# Patient Record
Sex: FEMALE | Race: WHITE | Employment: FULL TIME | URBAN - METROPOLITAN AREA
[De-identification: names, ages, dates, MRNs, and addresses within clinical notes are randomized per-mention and may not be internally consistent; named-entity substitution may affect disease eponyms.]

---

## 2017-05-15 ENCOUNTER — OFFICE VISIT (OUTPATIENT)
Dept: OBGYN CLINIC | Age: 22
End: 2017-05-15
Payer: MEDICARE

## 2017-05-15 VITALS
WEIGHT: 149 LBS | RESPIRATION RATE: 16 BRPM | BODY MASS INDEX: 27.42 KG/M2 | SYSTOLIC BLOOD PRESSURE: 126 MMHG | DIASTOLIC BLOOD PRESSURE: 77 MMHG | HEART RATE: 93 BPM | HEIGHT: 62 IN

## 2017-05-15 DIAGNOSIS — Z30.09 ENCOUNTER FOR OTHER GENERAL COUNSELING OR ADVICE ON CONTRACEPTION: Primary | ICD-10-CM

## 2017-05-15 PROCEDURE — 99212 OFFICE O/P EST SF 10 MIN: CPT | Performed by: SPECIALIST

## 2017-05-15 ASSESSMENT — ENCOUNTER SYMPTOMS
CONSTIPATION: 0
DIARRHEA: 0
VOMITING: 0
APNEA: 0
COUGH: 0
NAUSEA: 0
ABDOMINAL PAIN: 0
ABDOMINAL DISTENTION: 0
EYE PAIN: 0

## 2017-05-24 ENCOUNTER — PROCEDURE VISIT (OUTPATIENT)
Dept: OBGYN CLINIC | Age: 22
End: 2017-05-24
Payer: MEDICARE

## 2017-05-24 VITALS
BODY MASS INDEX: 27.07 KG/M2 | DIASTOLIC BLOOD PRESSURE: 78 MMHG | WEIGHT: 148 LBS | HEART RATE: 83 BPM | SYSTOLIC BLOOD PRESSURE: 122 MMHG | RESPIRATION RATE: 18 BRPM

## 2017-05-24 DIAGNOSIS — Z32.02 NEGATIVE PREGNANCY TEST: ICD-10-CM

## 2017-05-24 DIAGNOSIS — Z30.017 NEXPLANON INSERTION: Primary | ICD-10-CM

## 2017-05-24 PROCEDURE — 11981 INSERTION DRUG DLVR IMPLANT: CPT | Performed by: SPECIALIST

## 2017-05-24 PROCEDURE — 81025 URINE PREGNANCY TEST: CPT | Performed by: SPECIALIST

## 2017-05-24 ASSESSMENT — ENCOUNTER SYMPTOMS
VOMITING: 0
EYE PAIN: 0
APNEA: 0
CONSTIPATION: 0
NAUSEA: 0
ABDOMINAL DISTENTION: 0
ABDOMINAL PAIN: 0
DIARRHEA: 0
COUGH: 0

## 2017-08-01 ENCOUNTER — OFFICE VISIT (OUTPATIENT)
Dept: OBGYN CLINIC | Age: 22
End: 2017-08-01
Payer: MEDICARE

## 2017-08-01 ENCOUNTER — HOSPITAL ENCOUNTER (OUTPATIENT)
Age: 22
Setting detail: SPECIMEN
Discharge: HOME | End: 2017-08-01
Payer: MEDICARE

## 2017-08-01 VITALS
HEART RATE: 76 BPM | WEIGHT: 147 LBS | BODY MASS INDEX: 27.05 KG/M2 | SYSTOLIC BLOOD PRESSURE: 126 MMHG | RESPIRATION RATE: 18 BRPM | HEIGHT: 62 IN | OXYGEN SATURATION: 99 % | DIASTOLIC BLOOD PRESSURE: 78 MMHG

## 2017-08-01 DIAGNOSIS — R30.0 DYSURIA: ICD-10-CM

## 2017-08-01 DIAGNOSIS — R31.9 URINARY TRACT INFECTION WITH HEMATURIA, SITE UNSPECIFIED: ICD-10-CM

## 2017-08-01 DIAGNOSIS — R51.9 HEADACHE, UNSPECIFIED HEADACHE TYPE: ICD-10-CM

## 2017-08-01 DIAGNOSIS — Z32.02 NEGATIVE PREGNANCY TEST: ICD-10-CM

## 2017-08-01 DIAGNOSIS — Z12.4 CERVICAL CANCER SCREENING: ICD-10-CM

## 2017-08-01 DIAGNOSIS — Z01.419 WELL WOMAN EXAM: ICD-10-CM

## 2017-08-01 DIAGNOSIS — R11.0 NAUSEA: Primary | ICD-10-CM

## 2017-08-01 DIAGNOSIS — N39.0 URINARY TRACT INFECTION WITH HEMATURIA, SITE UNSPECIFIED: ICD-10-CM

## 2017-08-01 LAB
BILIRUBIN, POC: ABNORMAL
BLOOD URINE, POC: ABNORMAL
CLARITY, POC: ABNORMAL
COLOR, POC: YELLOW
CONTROL: NORMAL
GLUCOSE URINE, POC: 250
KETONES, POC: ABNORMAL
LEUKOCYTE EST, POC: ABNORMAL
NITRITE, POC: ABNORMAL
PH, POC: 6.5
PREGNANCY TEST URINE, POC: NORMAL
PROTEIN, POC: ABNORMAL
SPECIFIC GRAVITY, POC: 1.02
UROBILINOGEN, POC: 2

## 2017-08-01 PROCEDURE — 81025 URINE PREGNANCY TEST: CPT | Performed by: SPECIALIST

## 2017-08-01 PROCEDURE — 99395 PREV VISIT EST AGE 18-39: CPT | Performed by: SPECIALIST

## 2017-08-01 RX ORDER — NITROFURANTOIN 25; 75 MG/1; MG/1
100 CAPSULE ORAL 2 TIMES DAILY
Qty: 14 CAPSULE | Refills: 0 | Status: SHIPPED | OUTPATIENT
Start: 2017-08-01 | End: 2017-08-08

## 2017-08-01 ASSESSMENT — ENCOUNTER SYMPTOMS
ABDOMINAL PAIN: 0
EYE PAIN: 0
NAUSEA: 1
APNEA: 0
ABDOMINAL DISTENTION: 0
COUGH: 0
DIARRHEA: 0
CONSTIPATION: 0
VOMITING: 0

## 2017-08-03 LAB — CYTOLOGY REPORT: NORMAL

## 2018-04-26 ENCOUNTER — TELEPHONE (OUTPATIENT)
Dept: OBGYN CLINIC | Age: 23
End: 2018-04-26

## 2018-04-27 RX ORDER — METRONIDAZOLE 500 MG/1
500 TABLET ORAL 2 TIMES DAILY
Qty: 14 TABLET | Refills: 0 | Status: SHIPPED | OUTPATIENT
Start: 2018-04-27 | End: 2018-05-04

## 2018-04-27 RX ORDER — FLUCONAZOLE 100 MG/1
100 TABLET ORAL DAILY
Qty: 7 TABLET | Refills: 0 | Status: SHIPPED | OUTPATIENT
Start: 2018-04-27 | End: 2018-05-04

## 2018-08-17 ENCOUNTER — OFFICE VISIT (OUTPATIENT)
Dept: OBGYN CLINIC | Age: 23
End: 2018-08-17
Payer: MEDICARE

## 2018-08-17 VITALS
BODY MASS INDEX: 34.96 KG/M2 | DIASTOLIC BLOOD PRESSURE: 68 MMHG | RESPIRATION RATE: 18 BRPM | WEIGHT: 190 LBS | HEART RATE: 106 BPM | SYSTOLIC BLOOD PRESSURE: 102 MMHG

## 2018-08-17 DIAGNOSIS — Z30.46 ENCOUNTER FOR NEXPLANON REMOVAL: Primary | ICD-10-CM

## 2018-08-17 DIAGNOSIS — Z32.02 NEGATIVE PREGNANCY TEST: ICD-10-CM

## 2018-08-17 DIAGNOSIS — Z30.011 ENCOUNTER FOR BCP (BIRTH CONTROL PILLS) INITIAL PRESCRIPTION: ICD-10-CM

## 2018-08-17 LAB
CONTROL: NORMAL
PREGNANCY TEST URINE, POC: NORMAL

## 2018-08-17 PROCEDURE — 81025 URINE PREGNANCY TEST: CPT | Performed by: CLINICAL NURSE SPECIALIST

## 2018-08-17 PROCEDURE — G8417 CALC BMI ABV UP PARAM F/U: HCPCS | Performed by: CLINICAL NURSE SPECIALIST

## 2018-08-17 PROCEDURE — 4004F PT TOBACCO SCREEN RCVD TLK: CPT | Performed by: CLINICAL NURSE SPECIALIST

## 2018-08-17 PROCEDURE — 99213 OFFICE O/P EST LOW 20 MIN: CPT | Performed by: CLINICAL NURSE SPECIALIST

## 2018-08-17 PROCEDURE — 11982 REMOVE DRUG IMPLANT DEVICE: CPT | Performed by: CLINICAL NURSE SPECIALIST

## 2018-08-17 PROCEDURE — G8427 DOCREV CUR MEDS BY ELIG CLIN: HCPCS | Performed by: CLINICAL NURSE SPECIALIST

## 2018-08-17 RX ORDER — DESOGESTREL AND ETHINYL ESTRADIOL 21-5 (28)
1 KIT ORAL DAILY
Qty: 1 PACKET | Refills: 2 | Status: SHIPPED | OUTPATIENT
Start: 2018-08-17 | End: 2020-05-27

## 2018-08-17 ASSESSMENT — ENCOUNTER SYMPTOMS
GASTROINTESTINAL NEGATIVE: 1
EYES NEGATIVE: 1
ALLERGIC/IMMUNOLOGIC NEGATIVE: 1
RESPIRATORY NEGATIVE: 1

## 2018-08-17 NOTE — PROGRESS NOTES
DATE OF VISIT:  8/17/18    PATIENT NAME:  Mesfin Cox     YOB: 1995    REASON FOR VISIT:    Chief Complaint   Patient presents with    Contraception     Pt is here today to get her IUD removed. She states that since she got it put in, she has gained about 40 pounds. She denies any irregular bleeding/clotting. Pt would like to possibly do the pills so she can read on the IUD        HISTORY OF PRESENT ILLNESS:  HPI     Patient is a 26 yo female who presents for nexplanon removal.  Patient states that since she had her nexplanon placed she has gained approx. 50lbs. Patient would like to switch to birth control pills. No LMP recorded. Vitals:    08/17/18 0942   BP: 102/68   Site: Right Arm   Position: Sitting   Cuff Size: Large Adult   Pulse: 106   Resp: 18   Weight: 190 lb (86.2 kg)     Body mass index is 34.96 kg/m². No Known Allergies  Current Outpatient Prescriptions   Medication Sig Dispense Refill    desogestrel-ethinyl estradiol (KARIVA) 0.15-0.02/0.01 MG (21/5) per tablet Take 1 tablet by mouth daily 1 packet 2     No current facility-administered medications for this visit. Social History     Social History    Marital status: Single     Spouse name: N/A    Number of children: N/A    Years of education: N/A     Social History Main Topics    Smoking status: Current Every Day Smoker     Packs/day: 0.50     Types: Cigarettes    Smokeless tobacco: Never Used    Alcohol use No    Drug use: No    Sexual activity: Yes     Partners: Male     Other Topics Concern    None     Social History Narrative    None       REVIEW OF SYSTEMS:  Review of Systems   Constitutional: Positive for unexpected weight change (patient has gained 50 lbs since her nexplanon insertion). Negative for chills and fever. HENT: Negative. Eyes: Negative. Respiratory: Negative. Cardiovascular: Negative. Gastrointestinal: Negative. Endocrine: Negative.     Genitourinary: Negative for

## 2018-08-21 ENCOUNTER — HOSPITAL ENCOUNTER (OUTPATIENT)
Age: 23
Setting detail: SPECIMEN
Discharge: HOME | End: 2018-08-21
Payer: MEDICARE

## 2018-08-21 ENCOUNTER — OFFICE VISIT (OUTPATIENT)
Dept: OBGYN CLINIC | Age: 23
End: 2018-08-21
Payer: MEDICARE

## 2018-08-21 VITALS
BODY MASS INDEX: 35.04 KG/M2 | HEART RATE: 84 BPM | SYSTOLIC BLOOD PRESSURE: 116 MMHG | DIASTOLIC BLOOD PRESSURE: 70 MMHG | HEIGHT: 62 IN | WEIGHT: 190.4 LBS

## 2018-08-21 DIAGNOSIS — Z01.419 WOMEN'S ANNUAL ROUTINE GYNECOLOGICAL EXAMINATION: Primary | ICD-10-CM

## 2018-08-21 DIAGNOSIS — Z12.4 CERVICAL CANCER SCREENING: ICD-10-CM

## 2018-08-21 DIAGNOSIS — Z11.3 SCREENING FOR STDS (SEXUALLY TRANSMITTED DISEASES): ICD-10-CM

## 2018-08-21 PROCEDURE — 99395 PREV VISIT EST AGE 18-39: CPT | Performed by: CLINICAL NURSE SPECIALIST

## 2018-08-21 NOTE — PROGRESS NOTES
Constitutional:  Unexpected weight change, extreme fatigue, night sweats                                                                                    no  Skin:                           Rashes on left breast which began 2 days ago, moles   yes  Neurological:  Frequent headaches, seizures         no  Ophthalmic:  Recent visual changes no  ENT:   Difficulty swallowing  no  Breast:              Masses, pain, nipple discharge white and very intermittently                           yes     Respiratory:  Shortness of breath, coughing smoker           yes    Cardiovascular: Chest pain   no     Gastrointestinal: Chronic diarrhea/constipation, nausea/vomiting           no   Urogenital:  Urinary incontinence, frequency intermittently, urgency          yes                                         Heavy/irregular periods  Last menses was April          no                                      Vaginal discharge     Light brown discharge for approx. 2 days with itching              yes  Hematological: Bruises easy Denies clotting disorder  yes     Endocrine:  Hot flashes   no     Hot/Cold Intolerance  no    Psychological:            Mood and affect were within normal limits. Mood swings and does not feel like she needs meds          yes                                                                                                                   Physical Exam    Physical Exam:    Vitals:    08/21/18 1313   BP: 116/70   Pulse: 84        General Appearance: This  is a well developed, well nourished, well groomed female. Her BMI was reviewed. Nutritional decision making and exercise were discussed. Neurological:  The patient is alert and oriented to time, place, person, and situation    Skin:  A brief inspection of the skin revealed no rashes or lesions. Neck:  The neck was supple. Respiratory: There was unlabored respiratory effort. Lungs clear to ascultation. Cardiovascular:   The patients extremities were without calf tenderness or edema. Heart with a regular rate and rhythm. Abdomen: The abdomen was soft and non-tender with no guarding, rebound or rigidity. No hernias were appreciated. Breast:   The patients breasts were symmetrical.  There were no masses, discharge or retractions noted. Self breast exams were reviewed. Pelvic Exam:  The external genitalia was with a normal appearance. The vaginal vault was normal. There were no cystocele, rectocele, or enterocele appreciated. There was scant white vaginal discharge. The cervix was without lesions. There was no cervical motion tenderness. The uterus was mobile, midline and regular. The adnexa were palpated. No fullness, tenderness or masses appreciated. ASSESSMENT:     Normal annual well woman exam    25 y.o. Female; Annual   Diagnosis Orders   1. Women's annual routine gynecological examination  PAP SMEAR    Chlamydia Trachomatis & Neisseria gonorrhoeae (GC) by amplified detection    VAGINITIS DNA PROBE   2. Screening for STDs (sexually transmitted diseases)  Chlamydia Trachomatis & Neisseria gonorrhoeae (GC) by amplified detection    VAGINITIS DNA PROBE   3. Cervical cancer screening  PAP SMEAR     Return for 1 year for Annual and as needed. PLAN:  - Pap collected. Discussed new pap smear guidelines. - Birth control Discussed. - Smoking risk factors Discussed  - Diet and exercise reviewed. - Routine health maintenance per patients PCP.  - Return to clinic in 1 year or earlier with questions, problems, concerns.         Electronically signed by MACK Charles CNP on 8/21/2018 at 1:40 PM

## 2018-08-22 DIAGNOSIS — Z01.419 WOMEN'S ANNUAL ROUTINE GYNECOLOGICAL EXAMINATION: ICD-10-CM

## 2018-08-22 DIAGNOSIS — Z11.3 SCREENING FOR STDS (SEXUALLY TRANSMITTED DISEASES): ICD-10-CM

## 2018-08-22 LAB
C TRACH DNA GENITAL QL NAA+PROBE: NEGATIVE
DIRECT EXAM: NORMAL
Lab: NORMAL
N. GONORRHOEAE DNA: NEGATIVE
SPECIMEN DESCRIPTION: NORMAL
STATUS: NORMAL

## 2018-09-10 LAB — CYTOLOGY REPORT: NORMAL

## 2018-09-24 ENCOUNTER — APPOINTMENT (OUTPATIENT)
Dept: CT IMAGING | Age: 23
End: 2018-09-24
Payer: MEDICARE

## 2018-09-24 ENCOUNTER — APPOINTMENT (OUTPATIENT)
Dept: GENERAL RADIOLOGY | Age: 23
End: 2018-09-24
Payer: MEDICARE

## 2018-09-24 ENCOUNTER — HOSPITAL ENCOUNTER (EMERGENCY)
Age: 23
Discharge: HOME | End: 2018-09-25
Attending: EMERGENCY MEDICINE
Payer: MEDICARE

## 2018-09-24 VITALS
OXYGEN SATURATION: 99 % | HEART RATE: 94 BPM | RESPIRATION RATE: 16 BRPM | TEMPERATURE: 98.2 F | WEIGHT: 190 LBS | BODY MASS INDEX: 34.75 KG/M2 | DIASTOLIC BLOOD PRESSURE: 86 MMHG | SYSTOLIC BLOOD PRESSURE: 145 MMHG

## 2018-09-24 DIAGNOSIS — R07.89 CHEST WALL PAIN: Primary | ICD-10-CM

## 2018-09-24 DIAGNOSIS — R05.9 COUGH: ICD-10-CM

## 2018-09-24 LAB
ABSOLUTE EOS #: 0.12 K/UL (ref 0–0.4)
ABSOLUTE IMMATURE GRANULOCYTE: 0 K/UL (ref 0–0.3)
ABSOLUTE LYMPH #: 4.72 K/UL (ref 1–4.8)
ABSOLUTE MONO #: 0.12 K/UL (ref 0.1–0.8)
ANION GAP SERPL CALCULATED.3IONS-SCNC: 13 MMOL/L (ref 9–17)
BASOPHILS # BLD: 0 % (ref 0–2)
BASOPHILS ABSOLUTE: 0 K/UL (ref 0–0.2)
BUN BLDV-MCNC: 8 MG/DL (ref 6–20)
BUN/CREAT BLD: ABNORMAL (ref 9–20)
CALCIUM SERPL-MCNC: 8.8 MG/DL (ref 8.6–10.4)
CHLORIDE BLD-SCNC: 105 MMOL/L (ref 98–107)
CO2: 21 MMOL/L (ref 20–31)
CREAT SERPL-MCNC: 0.57 MG/DL (ref 0.5–0.9)
D-DIMER QUANTITATIVE: 0.63 MG/L FEU
DIFFERENTIAL TYPE: ABNORMAL
EKG ATRIAL RATE: 99 BPM
EKG P AXIS: 56 DEGREES
EKG P-R INTERVAL: 182 MS
EKG Q-T INTERVAL: 326 MS
EKG QRS DURATION: 82 MS
EKG QTC CALCULATION (BAZETT): 418 MS
EKG R AXIS: 30 DEGREES
EKG T AXIS: 44 DEGREES
EKG VENTRICULAR RATE: 99 BPM
EOSINOPHILS RELATIVE PERCENT: 1 % (ref 1–4)
GFR AFRICAN AMERICAN: >60 ML/MIN
GFR NON-AFRICAN AMERICAN: >60 ML/MIN
GFR SERPL CREATININE-BSD FRML MDRD: ABNORMAL ML/MIN/{1.73_M2}
GFR SERPL CREATININE-BSD FRML MDRD: ABNORMAL ML/MIN/{1.73_M2}
GLUCOSE BLD-MCNC: 131 MG/DL (ref 70–99)
HCT VFR BLD CALC: 43.9 % (ref 36.3–47.1)
HEMOGLOBIN: 14.4 G/DL (ref 11.9–15.1)
IMMATURE GRANULOCYTES: 0 %
LYMPHOCYTES # BLD: 40 % (ref 24–44)
MCH RBC QN AUTO: 30.7 PG (ref 25.2–33.5)
MCHC RBC AUTO-ENTMCNC: 32.8 G/DL (ref 28.4–34.8)
MCV RBC AUTO: 93.6 FL (ref 82.6–102.9)
MONOCYTES # BLD: 1 % (ref 1–7)
MORPHOLOGY: NORMAL
NRBC AUTOMATED: 0 PER 100 WBC
PDW BLD-RTO: 13.1 % (ref 11.8–14.4)
PLATELET # BLD: 303 K/UL (ref 138–453)
PLATELET ESTIMATE: ABNORMAL
PMV BLD AUTO: 9 FL (ref 8.1–13.5)
POC TROPONIN I: 0 NG/ML (ref 0–0.1)
POC TROPONIN INTERP: NORMAL
POTASSIUM SERPL-SCNC: 4.2 MMOL/L (ref 3.7–5.3)
RBC # BLD: 4.69 M/UL (ref 3.95–5.11)
RBC # BLD: ABNORMAL 10*6/UL
SEG NEUTROPHILS: 58 % (ref 36–66)
SEGMENTED NEUTROPHILS ABSOLUTE COUNT: 6.84 K/UL (ref 1.8–7.7)
SODIUM BLD-SCNC: 139 MMOL/L (ref 135–144)
TSH SERPL DL<=0.05 MIU/L-ACNC: 2.83 MIU/L (ref 0.3–5)
WBC # BLD: 11.8 K/UL (ref 3.5–11.3)
WBC # BLD: ABNORMAL 10*3/UL

## 2018-09-24 PROCEDURE — 93005 ELECTROCARDIOGRAM TRACING: CPT

## 2018-09-24 PROCEDURE — 84484 ASSAY OF TROPONIN QUANT: CPT

## 2018-09-24 PROCEDURE — 71046 X-RAY EXAM CHEST 2 VIEWS: CPT

## 2018-09-24 PROCEDURE — 99285 EMERGENCY DEPT VISIT HI MDM: CPT

## 2018-09-24 PROCEDURE — 71260 CT THORAX DX C+: CPT

## 2018-09-24 PROCEDURE — 6360000004 HC RX CONTRAST MEDICATION: Performed by: EMERGENCY MEDICINE

## 2018-09-24 PROCEDURE — 85025 COMPLETE CBC W/AUTO DIFF WBC: CPT

## 2018-09-24 PROCEDURE — 85379 FIBRIN DEGRADATION QUANT: CPT

## 2018-09-24 PROCEDURE — 84443 ASSAY THYROID STIM HORMONE: CPT

## 2018-09-24 PROCEDURE — 80048 BASIC METABOLIC PNL TOTAL CA: CPT

## 2018-09-24 RX ADMIN — IOPAMIDOL 75 ML: 755 INJECTION, SOLUTION INTRAVENOUS at 22:42

## 2018-09-24 ASSESSMENT — HEART SCORE: ECG: 1

## 2018-09-24 ASSESSMENT — ENCOUNTER SYMPTOMS
SHORTNESS OF BREATH: 1
COUGH: 1
ABDOMINAL PAIN: 0
RHINORRHEA: 0

## 2018-09-24 ASSESSMENT — PAIN DESCRIPTION - ORIENTATION: ORIENTATION: LEFT

## 2018-09-24 ASSESSMENT — PAIN DESCRIPTION - FREQUENCY: FREQUENCY: INTERMITTENT

## 2018-09-24 ASSESSMENT — PAIN SCALES - GENERAL: PAINLEVEL_OUTOF10: 7

## 2018-09-24 ASSESSMENT — PAIN DESCRIPTION - DESCRIPTORS: DESCRIPTORS: SHARP

## 2018-09-24 ASSESSMENT — PAIN DESCRIPTION - PAIN TYPE: TYPE: ACUTE PAIN

## 2018-09-24 ASSESSMENT — PAIN DESCRIPTION - LOCATION: LOCATION: RIB CAGE

## 2018-09-25 LAB
POC TROPONIN I: 0.01 NG/ML (ref 0–0.1)
POC TROPONIN INTERP: NORMAL

## 2018-09-25 PROCEDURE — 84484 ASSAY OF TROPONIN QUANT: CPT

## 2018-09-25 NOTE — ED PROVIDER NOTES
circumstances.)    MD Atiya Hensley  Attending Emergency Medicine Physician            Juan Sears MD  09/24/18 7602

## 2018-09-25 NOTE — ED NOTES
Pt ambulatory to room, c/o left side rib/chest pain when lifting heavy objects, moving a certain way or taking a deep breath. Pt also c/o cough, since yesterday. Pt arrived with NAD noted, resp even and unlabored, alert and oriented x4, family at bedside. Pt also states she has sore throat.  Bed locked in lowest position and call light within reach,.           Damian Monique RN  09/24/18 4471

## 2018-09-25 NOTE — ED NOTES
Pt resting on cot with no distress noted, family at bedside, no distress, rr even and unlabored, call light within reach. Awaiting second trop.       Cindy Farnsworth RN  09/24/18 6605

## 2018-09-25 NOTE — ED PROVIDER NOTES
[facility]  ED  Emergency Department Encounter  Emergency Medicine Resident     Pt Name: Celestino Vargas  MRN: [id number]  [place] 1995  Date of evaluation: 9/24/18  PCP:  Everett Awad DO    CHIEF COMPLAINT       Chief Complaint   Patient presents with    Cough     since yesterday    Chest Pain     left sided, sharp when moving or lifting heavy objects, radiates to rigth shoulder at times    Pharyngitis       HISTORY OF PRESENT ILLNESS  (Location/Symptom, Timing/Onset, Context/Setting, Quality, Duration, Modifying Factors, Severity, Associated signs/symptoms)     Celestino Vargas is a 25 y.o. female who presents Multiple complaints of cough, chest pressure. States that this is an ongoing for the last few days. She does state that when she takes a deep breath she has worsening of the substernal chest pain. Does not have a history of any DVT but does have a risk factor of being on birth control. Denies any swelling in her lower extremity's. She does state that she has some shortness of breath associated with her symptoms. Denies any jaw pain or nausea or vomiting associated with her symptoms. Is a daily smoker. No history of CAD. No history of CAD and family although mom does have MVP. Otherwise denies any active medical issues and does not take any medications daily. PAST MEDICAL / SURGICAL / SOCIAL / FAMILY HISTORY      has no past medical history on file. has a past surgical history that includes Tonsillectomy and Cystocopy. Social History     Social History    Marital status: Single     Spouse name: N/A    Number of children: N/A    Years of education: N/A     Occupational History    Not on file.      Social History Main Topics    Smoking status: Current Every Day Smoker     Packs/day: 0.50     Types: Cigarettes    Smokeless tobacco: Never Used    Alcohol use No    Drug use: No    Sexual activity: Yes     Partners: Male     Other Topics Concern  Not on file     Social History Narrative    No narrative on file       Family History   Problem Relation Age of Onset    Heart Disease Father     Asthma Brother        Allergies:  Patient has no known allergies. Home Medications:  Prior to Admission medications    Medication Sig Start Date End Date Taking? Authorizing Provider   desogestrel-ethinyl estradiol (KARIVA) 0.15-0.02/0.01 MG (21/5) per tablet Take 1 tablet by mouth daily 8/17/18  Yes Palomo Ayers, APRN - CNP       REVIEW OF SYSTEMS    (2-9 systems for level 4, 10 or more for level 5)      Review of Systems   Constitutional: Negative for chills and fever. HENT: Negative for congestion and rhinorrhea. Eyes: Negative for visual disturbance. Respiratory: Positive for cough and shortness of breath. Cardiovascular: Positive for chest pain. Gastrointestinal: Negative for abdominal pain. Genitourinary: Negative for dysuria and frequency. Musculoskeletal: Negative for arthralgias. Skin: Negative for wound. Neurological: Negative for dizziness and light-headedness. PHYSICAL EXAM   (up to 7 for level 4, 8 or more for level 5)      INITIAL VITALS:   BP (!) 145/86   Pulse 94   Temp 98.2 °F (36.8 °C) (Oral)   Resp 16   Wt 190 lb (86.2 kg)   SpO2 99%   BMI 34.75 kg/m²     Physical Exam   Constitutional: She is oriented to person, place, and time. No distress. HENT:   Head: Normocephalic and atraumatic. Eyes: Right eye exhibits no discharge. Left eye exhibits no discharge. Cardiovascular: Normal rate, regular rhythm and normal heart sounds. Exam reveals no friction rub. No murmur heard. Pulmonary/Chest: Effort normal and breath sounds normal. No stridor. No respiratory distress. She has no wheezes. She has no rales. She exhibits no tenderness. Abdominal: Soft. She exhibits no distension. There is no tenderness. There is no guarding. Neurological: She is alert and oriented to person, place, and time.    Skin: Skin is 31 mmol/L    Anion Gap 13 9 - 17 mmol/L    GFR Non-African American >60 >60 mL/min    GFR African American >60 >60 mL/min    GFR Comment          GFR Staging NOT REPORTED    TSH with Reflex   Result Value Ref Range    TSH 2.83 0.30 - 5.00 mIU/L   D-Dimer, Quantitative   Result Value Ref Range    D-Dimer, Quant 0.63 mg/L FEU   POCT troponin   Result Value Ref Range    POC Troponin I 0.00 0.00 - 0.10 ng/mL    POC Troponin Interp       The Troponin-I (POC) results cannot be compared to the Troponin-T results. POCT troponin   Result Value Ref Range    POC Troponin I 0.01 0.00 - 0.10 ng/mL    POC Troponin Interp       The Troponin-I (POC) results cannot be compared to the Troponin-T results. RADIOLOGY:  Xr Chest Standard (2 Vw)    Result Date: 9/24/2018  EXAMINATION: TWO VIEWS OF THE CHEST 9/24/2018 10:06 pm COMPARISON: 10/21/2011 HISTORY: ORDERING SYSTEM PROVIDED HISTORY: chest pain TECHNOLOGIST PROVIDED HISTORY: chest pain FINDINGS: The cardiac silhouette is within normal limits for size. The pulmonary vasculature is within normal limits. There is no focal consolidation, pleural effusion or pneumothorax. The visualized osseous structures demonstrate no acute abnormality. No acute cardiopulmonary abnormality. Ct Chest Pulmonary Embolism W Contrast    Result Date: 9/24/2018  EXAMINATION: CTA OF THE CHEST 9/24/2018 10:50 pm TECHNIQUE: CTA of the chest was performed after the administration of intravenous contrast.  Multiplanar reformatted images are provided for review. MIP images are provided for review. Dose modulation, iterative reconstruction, and/or weight based adjustment of the mA/kV was utilized to reduce the radiation dose to as low as reasonably achievable. COMPARISON: None. HISTORY: ORDERING SYSTEM PROVIDED, possible pulmonary embolism. Acute onset of chest pain following lifting objects. Initial presentation.  FINDINGS: Pulmonary Arteries: No filling defects are identified within the main,

## 2018-10-06 ENCOUNTER — HOSPITAL ENCOUNTER (OUTPATIENT)
Age: 23
Discharge: HOME | End: 2018-10-06
Payer: MEDICARE

## 2018-10-06 LAB
ALBUMIN SERPL-MCNC: 3.9 G/DL (ref 3.5–5.2)
ALBUMIN/GLOBULIN RATIO: 1.3 (ref 1–2.5)
ALP BLD-CCNC: 108 U/L (ref 35–104)
ALT SERPL-CCNC: 25 U/L (ref 5–33)
AST SERPL-CCNC: 15 U/L
BILIRUB SERPL-MCNC: 0.38 MG/DL (ref 0.3–1.2)
BILIRUBIN DIRECT: 0.09 MG/DL
BILIRUBIN, INDIRECT: 0.29 MG/DL (ref 0–1)
CHOLESTEROL/HDL RATIO: 6.1
CHOLESTEROL: 182 MG/DL
GLOBULIN: ABNORMAL G/DL (ref 1.5–3.8)
HDLC SERPL-MCNC: 30 MG/DL
LDL CHOLESTEROL: 128 MG/DL (ref 0–130)
T3 FREE: 3.67 PG/ML (ref 2.02–4.43)
THYROXINE, FREE: 1.36 NG/DL (ref 0.93–1.7)
TOTAL PROTEIN: 6.9 G/DL (ref 6.4–8.3)
TRIGL SERPL-MCNC: 121 MG/DL
TSH SERPL DL<=0.05 MIU/L-ACNC: 1.16 MIU/L (ref 0.3–5)
VLDLC SERPL CALC-MCNC: ABNORMAL MG/DL (ref 1–30)

## 2018-10-06 PROCEDURE — 84439 ASSAY OF FREE THYROXINE: CPT

## 2018-10-06 PROCEDURE — 80061 LIPID PANEL: CPT

## 2018-10-06 PROCEDURE — 83036 HEMOGLOBIN GLYCOSYLATED A1C: CPT

## 2018-10-06 PROCEDURE — 84481 FREE ASSAY (FT-3): CPT

## 2018-10-06 PROCEDURE — 80076 HEPATIC FUNCTION PANEL: CPT

## 2018-10-06 PROCEDURE — 36415 COLL VENOUS BLD VENIPUNCTURE: CPT

## 2018-10-06 PROCEDURE — 84443 ASSAY THYROID STIM HORMONE: CPT

## 2018-10-07 LAB
ESTIMATED AVERAGE GLUCOSE: 111 MG/DL
HBA1C MFR BLD: 5.5 % (ref 4–6)

## 2019-04-04 ENCOUNTER — HOSPITAL ENCOUNTER (EMERGENCY)
Age: 24
Discharge: HOME | End: 2019-04-04
Attending: EMERGENCY MEDICINE
Payer: MEDICARE

## 2019-04-04 VITALS
WEIGHT: 180 LBS | BODY MASS INDEX: 33.13 KG/M2 | HEIGHT: 62 IN | RESPIRATION RATE: 16 BRPM | SYSTOLIC BLOOD PRESSURE: 136 MMHG | TEMPERATURE: 98.2 F | DIASTOLIC BLOOD PRESSURE: 73 MMHG | OXYGEN SATURATION: 100 % | HEART RATE: 97 BPM

## 2019-04-04 DIAGNOSIS — M54.31 SCIATICA OF RIGHT SIDE: Primary | ICD-10-CM

## 2019-04-04 PROCEDURE — 99283 EMERGENCY DEPT VISIT LOW MDM: CPT

## 2019-04-04 ASSESSMENT — PAIN DESCRIPTION - FREQUENCY: FREQUENCY: INTERMITTENT

## 2019-04-04 ASSESSMENT — PAIN DESCRIPTION - ORIENTATION: ORIENTATION: RIGHT;UPPER

## 2019-04-04 ASSESSMENT — ENCOUNTER SYMPTOMS: BACK PAIN: 1

## 2019-04-04 ASSESSMENT — PAIN DESCRIPTION - LOCATION: LOCATION: LEG

## 2019-04-04 ASSESSMENT — PAIN DESCRIPTION - DESCRIPTORS: DESCRIPTORS: TINGLING;SHARP

## 2019-04-04 ASSESSMENT — PAIN SCALES - GENERAL: PAINLEVEL_OUTOF10: 5
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
## 2019-04-05 NOTE — ED PROVIDER NOTES
[address] ED  eMERGENCY dEPARTMENT eNCOUnter   Independent Attestation     Pt Name: Dilip Mejias  MRN: [id number]  [place] 1995  Date of evaluation: 4/4/19       Dilip Mejias is a 21 y.o. female who presents with Leg Pain        Based on the medical record, the care appears appropriate. I was personally available for consultation in the Emergency Department.     Hunter Galvan MD  Attending Emergency  Physician                  Hunter Galvan MD  04/04/19 9527

## 2019-09-23 ENCOUNTER — OFFICE VISIT (OUTPATIENT)
Dept: OBGYN CLINIC | Age: 24
End: 2019-09-23
Payer: MEDICARE

## 2019-09-23 VITALS
SYSTOLIC BLOOD PRESSURE: 127 MMHG | HEART RATE: 87 BPM | DIASTOLIC BLOOD PRESSURE: 79 MMHG | HEIGHT: 62 IN | BODY MASS INDEX: 33.49 KG/M2 | WEIGHT: 182 LBS

## 2019-09-23 DIAGNOSIS — N76.0 ACUTE VAGINITIS: Primary | ICD-10-CM

## 2019-09-23 DIAGNOSIS — N92.6 IRREGULAR BLEEDING: ICD-10-CM

## 2019-09-23 PROCEDURE — 4004F PT TOBACCO SCREEN RCVD TLK: CPT | Performed by: SPECIALIST

## 2019-09-23 PROCEDURE — G8427 DOCREV CUR MEDS BY ELIG CLIN: HCPCS | Performed by: SPECIALIST

## 2019-09-23 PROCEDURE — G8417 CALC BMI ABV UP PARAM F/U: HCPCS | Performed by: SPECIALIST

## 2019-09-23 PROCEDURE — 99213 OFFICE O/P EST LOW 20 MIN: CPT | Performed by: SPECIALIST

## 2019-09-23 RX ORDER — METRONIDAZOLE 500 MG/1
500 TABLET ORAL 2 TIMES DAILY
Qty: 14 TABLET | Refills: 0 | Status: SHIPPED | OUTPATIENT
Start: 2019-09-23 | End: 2019-09-30

## 2019-09-23 RX ORDER — FLUCONAZOLE 100 MG/1
100 TABLET ORAL DAILY
Qty: 7 TABLET | Refills: 0 | Status: SHIPPED | OUTPATIENT
Start: 2019-09-23 | End: 2019-09-30

## 2019-09-23 ASSESSMENT — ENCOUNTER SYMPTOMS
ABDOMINAL PAIN: 0
VOMITING: 0
EYE PAIN: 0
ABDOMINAL DISTENTION: 0
NAUSEA: 0
COUGH: 0
CONSTIPATION: 0
APNEA: 0
DIARRHEA: 0

## 2019-09-23 NOTE — PROGRESS NOTES
Subjective:      Patient ID: Janine Bustillo is a 21 y.o. female. Chief Complaint   Patient presents with    Vaginal Discharge     Patient is here today for vaginal discharge. She states that it is a slimy yellow/white color. She itches sometimes, and there is a slight odor. /79 (Site: Right Upper Arm, Position: Sitting, Cuff Size: Medium Adult)   Pulse 87   Ht 5' 2\" (1.575 m)   Wt 182 lb (82.6 kg)   LMP 09/17/2019   BMI 33.29 kg/m²   Patient's last menstrual period was 09/17/2019. [id number]    No past medical history on file. Current Outpatient Medications Ordered in Epic   Medication Sig Dispense Refill    metroNIDAZOLE (FLAGYL) 500 MG tablet Take 1 tablet by mouth 2 times daily for 7 days 14 tablet 0    fluconazole (DIFLUCAN) 100 MG tablet Take 1 tablet by mouth daily for 7 days 7 tablet 0    desogestrel-ethinyl estradiol (KARIVA) 0.15-0.02/0.01 MG (21/5) per tablet Take 1 tablet by mouth daily (Patient not taking: Reported on 9/23/2019) 1 packet 2     No current Epic-ordered facility-administered medications on file. Problem List Items Addressed This Visit     None      Visit Diagnoses     Acute vaginitis    -  Primary        No Known Allergies  No orders of the defined types were placed in this encounter. Patient is here today complaining of a vaginal discharge. She states that it is a slimy yellow/white color. She itches sometimes, and there is a slight odor. She also complains of irregular periods. She is not using any birth control and would like to get pregnant. Review of Systems   Constitutional: Negative for activity change, appetite change and fever. HENT: Negative for ear discharge and ear pain. Eyes: Negative for pain and visual disturbance. Respiratory: Negative for apnea and cough. Cardiovascular: Negative for chest pain, palpitations and leg swelling.    Gastrointestinal: Negative for abdominal distention, abdominal pain, constipation,

## 2020-05-27 ENCOUNTER — OFFICE VISIT (OUTPATIENT)
Dept: OBGYN CLINIC | Age: 25
End: 2020-05-27
Payer: MEDICARE

## 2020-05-27 ENCOUNTER — HOSPITAL ENCOUNTER (OUTPATIENT)
Age: 25
Setting detail: SPECIMEN
Discharge: HOME | End: 2020-05-27
Payer: MEDICARE

## 2020-05-27 VITALS
WEIGHT: 195 LBS | HEIGHT: 62 IN | TEMPERATURE: 99.3 F | BODY MASS INDEX: 35.88 KG/M2 | SYSTOLIC BLOOD PRESSURE: 122 MMHG | DIASTOLIC BLOOD PRESSURE: 72 MMHG

## 2020-05-27 LAB
CONTROL: NORMAL
HCG QUANTITATIVE: ABNORMAL IU/L
PREGNANCY TEST URINE, POC: NORMAL

## 2020-05-27 PROCEDURE — 4004F PT TOBACCO SCREEN RCVD TLK: CPT | Performed by: CLINICAL NURSE SPECIALIST

## 2020-05-27 PROCEDURE — 81025 URINE PREGNANCY TEST: CPT | Performed by: CLINICAL NURSE SPECIALIST

## 2020-05-27 PROCEDURE — 99213 OFFICE O/P EST LOW 20 MIN: CPT | Performed by: CLINICAL NURSE SPECIALIST

## 2020-05-27 PROCEDURE — G8417 CALC BMI ABV UP PARAM F/U: HCPCS | Performed by: CLINICAL NURSE SPECIALIST

## 2020-05-27 PROCEDURE — G8427 DOCREV CUR MEDS BY ELIG CLIN: HCPCS | Performed by: CLINICAL NURSE SPECIALIST

## 2020-05-27 RX ORDER — VITAMIN A ACETATE, .BETA.-CAROTENE, ASCORBIC ACID, CHOLECALCIFEROL, .ALPHA.-TOCOPHEROL ACETATE, DL-, THIAMINE MONONITRATE, RIBOFLAVIN, NIACINAMIDE, PYRIDOXINE HYDROCHLORIDE, FOLIC ACID, CYANOCOBALAMIN, CALCIUM CARBONATE, FERROUS FUMARATE, ZINC OXIDE, AND CUPRIC OXIDE 2000; 2000; 120; 400; 22; 1.84; 3; 20; 10; 1; 12; 200; 27; 25; 2 [IU]/1; [IU]/1; MG/1; [IU]/1; MG/1; MG/1; MG/1; MG/1; MG/1; MG/1; UG/1; MG/1; MG/1; MG/1; MG/1
1 TABLET ORAL DAILY
Qty: 30 TABLET | Refills: 11 | Status: SHIPPED | OUTPATIENT
Start: 2020-05-27 | End: 2021-04-12

## 2020-05-27 NOTE — PROGRESS NOTES
today for a quant. Draw per physician order using sterile technique. Drawn from the right antecube. PLAN:  UCG was done and noted as positive  Prenatal vitamins were ordered: Yes  Ultrasound for dating and viability was ordered: Yes  1 hour glucola was ordered: Yes  She was instructed to call with any concerns or worsening of any symptoms  She will return for New OB intake visit  Dede akhtar reviewed    Patient was seen with total face to face time of 15 minutes. More than 50% of this visit was spent face to face coordinating plan of care and answering questions . She was also counseled on her preventative health maintenance recommendations and follow-up. Return for call in am for quant level if 5000 or above will schedule dating US.     Electronically signed by: Magalie Miller CNP

## 2020-06-29 ENCOUNTER — HOSPITAL ENCOUNTER (OUTPATIENT)
Age: 25
Setting detail: SPECIMEN
Discharge: HOME | End: 2020-06-29
Payer: MEDICARE

## 2020-06-29 ENCOUNTER — INITIAL PRENATAL (OUTPATIENT)
Dept: OBGYN CLINIC | Age: 25
End: 2020-06-29
Payer: MEDICARE

## 2020-06-29 VITALS
BODY MASS INDEX: 35.55 KG/M2 | HEIGHT: 62 IN | TEMPERATURE: 99.1 F | SYSTOLIC BLOOD PRESSURE: 111 MMHG | DIASTOLIC BLOOD PRESSURE: 65 MMHG | WEIGHT: 193.2 LBS | HEART RATE: 97 BPM

## 2020-06-29 PROCEDURE — G8417 CALC BMI ABV UP PARAM F/U: HCPCS | Performed by: CLINICAL NURSE SPECIALIST

## 2020-06-29 PROCEDURE — 36415 COLL VENOUS BLD VENIPUNCTURE: CPT | Performed by: CLINICAL NURSE SPECIALIST

## 2020-06-29 PROCEDURE — H1000 PRENATAL CARE ATRISK ASSESSM: HCPCS | Performed by: CLINICAL NURSE SPECIALIST

## 2020-06-29 PROCEDURE — G8427 DOCREV CUR MEDS BY ELIG CLIN: HCPCS | Performed by: CLINICAL NURSE SPECIALIST

## 2020-06-29 PROCEDURE — 4004F PT TOBACCO SCREEN RCVD TLK: CPT | Performed by: CLINICAL NURSE SPECIALIST

## 2020-06-29 PROCEDURE — 99213 OFFICE O/P EST LOW 20 MIN: CPT | Performed by: CLINICAL NURSE SPECIALIST

## 2020-06-30 LAB
-: NORMAL
ABO/RH: NORMAL
ABSOLUTE EOS #: 0.14 K/UL (ref 0–0.44)
ABSOLUTE IMMATURE GRANULOCYTE: 0.05 K/UL (ref 0–0.3)
ABSOLUTE LYMPH #: 3.04 K/UL (ref 1.1–3.7)
ABSOLUTE MONO #: 0.69 K/UL (ref 0.1–1.2)
AMORPHOUS: NORMAL
AMPHETAMINE SCREEN URINE: NEGATIVE
ANTIBODY SCREEN: NEGATIVE
BACTERIA: NORMAL
BARBITURATE SCREEN URINE: NEGATIVE
BASOPHILS # BLD: 0 % (ref 0–2)
BASOPHILS ABSOLUTE: <0.03 K/UL (ref 0–0.2)
BENZODIAZEPINE SCREEN, URINE: NEGATIVE
BILIRUBIN URINE: NEGATIVE
BUPRENORPHINE URINE: NORMAL
CANNABINOID SCREEN URINE: NEGATIVE
CASTS UA: NORMAL /LPF (ref 0–8)
COCAINE METABOLITE, URINE: NEGATIVE
COLOR: YELLOW
COMMENT UA: ABNORMAL
CRYSTALS, UA: NORMAL /HPF
DIFFERENTIAL TYPE: ABNORMAL
EOSINOPHILS RELATIVE PERCENT: 1 % (ref 1–4)
EPITHELIAL CELLS UA: NORMAL /HPF (ref 0–5)
GLUCOSE ADMINISTRATION: NORMAL
GLUCOSE TOLERANCE SCREEN 50G: 99 MG/DL (ref 70–135)
GLUCOSE URINE: NEGATIVE
HCT VFR BLD CALC: 41.5 % (ref 36.3–47.1)
HEMOGLOBIN: 13.1 G/DL (ref 11.9–15.1)
HEPATITIS B SURFACE ANTIGEN: NONREACTIVE
HIV AG/AB: NONREACTIVE
IMMATURE GRANULOCYTES: 0 %
KETONES, URINE: NEGATIVE
LEUKOCYTE ESTERASE, URINE: NEGATIVE
LYMPHOCYTES # BLD: 25 % (ref 24–43)
MCH RBC QN AUTO: 30.8 PG (ref 25.2–33.5)
MCHC RBC AUTO-ENTMCNC: 31.6 G/DL (ref 28.4–34.8)
MCV RBC AUTO: 97.6 FL (ref 82.6–102.9)
MDMA URINE: NORMAL
METHADONE SCREEN, URINE: NEGATIVE
METHAMPHETAMINE, URINE: NORMAL
MONOCYTES # BLD: 6 % (ref 3–12)
MUCUS: NORMAL
NITRITE, URINE: NEGATIVE
NRBC AUTOMATED: 0 PER 100 WBC
OPIATES, URINE: NEGATIVE
OTHER OBSERVATIONS UA: NORMAL
OXYCODONE SCREEN URINE: NEGATIVE
PDW BLD-RTO: 13.7 % (ref 11.8–14.4)
PH UA: 6.5 (ref 5–8)
PHENCYCLIDINE, URINE: NEGATIVE
PLATELET # BLD: 349 K/UL (ref 138–453)
PLATELET ESTIMATE: ABNORMAL
PMV BLD AUTO: 9.5 FL (ref 8.1–13.5)
PROPOXYPHENE, URINE: NORMAL
PROTEIN UA: NEGATIVE
RBC # BLD: 4.25 M/UL (ref 3.95–5.11)
RBC # BLD: ABNORMAL 10*6/UL
RBC UA: NORMAL /HPF (ref 0–4)
RENAL EPITHELIAL, UA: NORMAL /HPF
RUBV IGG SER QL: 77.2 IU/ML
SEG NEUTROPHILS: 68 % (ref 36–65)
SEGMENTED NEUTROPHILS ABSOLUTE COUNT: 8.11 K/UL (ref 1.5–8.1)
SICKLE CELL SCREEN: NEGATIVE
SPECIFIC GRAVITY UA: 1.01 (ref 1–1.03)
T. PALLIDUM, IGG: NONREACTIVE
TEST INFORMATION: NORMAL
TRICHOMONAS: NORMAL
TRICYCLIC ANTIDEPRESSANTS, UR: NORMAL
TSH SERPL DL<=0.05 MIU/L-ACNC: 0.82 MIU/L (ref 0.3–5)
TURBIDITY: ABNORMAL
URINE HGB: ABNORMAL
UROBILINOGEN, URINE: NORMAL
WBC # BLD: 12.1 K/UL (ref 3.5–11.3)
WBC # BLD: ABNORMAL 10*3/UL
WBC UA: NORMAL /HPF (ref 0–5)
YEAST: NORMAL

## 2020-07-28 ENCOUNTER — ROUTINE PRENATAL (OUTPATIENT)
Dept: OBGYN CLINIC | Age: 25
End: 2020-07-28
Payer: MEDICARE

## 2020-07-28 VITALS
TEMPERATURE: 98.6 F | BODY MASS INDEX: 34.96 KG/M2 | HEART RATE: 103 BPM | HEIGHT: 62 IN | SYSTOLIC BLOOD PRESSURE: 132 MMHG | DIASTOLIC BLOOD PRESSURE: 79 MMHG | WEIGHT: 190 LBS

## 2020-07-28 PROBLEM — Z3A.15 15 WEEKS GESTATION OF PREGNANCY: Status: ACTIVE | Noted: 2020-07-28

## 2020-07-28 PROBLEM — Z3A.00 WEEKS OF GESTATION OF PREGNANCY NOT SPECIFIED: Status: ACTIVE | Noted: 2020-07-28

## 2020-07-28 PROCEDURE — G8417 CALC BMI ABV UP PARAM F/U: HCPCS | Performed by: SPECIALIST

## 2020-07-28 PROCEDURE — G8427 DOCREV CUR MEDS BY ELIG CLIN: HCPCS | Performed by: SPECIALIST

## 2020-07-28 PROCEDURE — 4004F PT TOBACCO SCREEN RCVD TLK: CPT | Performed by: SPECIALIST

## 2020-07-28 PROCEDURE — 99213 OFFICE O/P EST LOW 20 MIN: CPT | Performed by: SPECIALIST

## 2020-08-25 ENCOUNTER — ROUTINE PRENATAL (OUTPATIENT)
Dept: OBGYN CLINIC | Age: 25
End: 2020-08-25
Payer: MEDICARE

## 2020-08-25 ENCOUNTER — HOSPITAL ENCOUNTER (OUTPATIENT)
Age: 25
Setting detail: SPECIMEN
Discharge: HOME | End: 2020-08-25
Payer: MEDICARE

## 2020-08-25 VITALS
TEMPERATURE: 97.6 F | BODY MASS INDEX: 35.41 KG/M2 | WEIGHT: 193.6 LBS | HEART RATE: 104 BPM | DIASTOLIC BLOOD PRESSURE: 68 MMHG | SYSTOLIC BLOOD PRESSURE: 115 MMHG

## 2020-08-25 PROBLEM — Z3A.19 19 WEEKS GESTATION OF PREGNANCY: Status: ACTIVE | Noted: 2020-08-25

## 2020-08-25 PROBLEM — Z3A.15 15 WEEKS GESTATION OF PREGNANCY: Status: RESOLVED | Noted: 2020-07-28 | Resolved: 2020-08-25

## 2020-08-25 PROCEDURE — G8427 DOCREV CUR MEDS BY ELIG CLIN: HCPCS | Performed by: SPECIALIST

## 2020-08-25 PROCEDURE — 4004F PT TOBACCO SCREEN RCVD TLK: CPT | Performed by: SPECIALIST

## 2020-08-25 PROCEDURE — 36415 COLL VENOUS BLD VENIPUNCTURE: CPT | Performed by: SPECIALIST

## 2020-08-25 PROCEDURE — G8417 CALC BMI ABV UP PARAM F/U: HCPCS | Performed by: SPECIALIST

## 2020-08-25 PROCEDURE — 99213 OFFICE O/P EST LOW 20 MIN: CPT | Performed by: SPECIALIST

## 2020-08-25 NOTE — PROGRESS NOTES
Patient was in the office today for a Maternal quad screen. Draw per physician order using sterile technique.   Drawn from the right antecubital.

## 2020-08-26 LAB
C TRACH DNA GENITAL QL NAA+PROBE: NEGATIVE
N. GONORRHOEAE DNA: NEGATIVE
SPECIMEN DESCRIPTION: NORMAL

## 2020-08-28 NOTE — PROGRESS NOTES
Gabriela Monsalve is a 25 y.o. female 19w1d        OB History    Para Term  AB Living   3 2 2 0 0 2   SAB TAB Ectopic Molar Multiple Live Births   0 0 0   0 2      # Outcome Date GA Lbr [place]/2nd Weight Sex Delivery Anes PTL Lv   3 Current            2 Term 16 40w0d  6 lb 14 oz (3.118 kg) M Vag-Spont   JUANA   1 Term 14 40w0d  7 lb (3.175 kg) F Vag-Spont EPI N JUANA       Chief Complaint   Patient presents with    Routine Prenatal Visit     Patient is 19 weeks and 1 day gestation and is here for supervision of normal pregnancy. Blood pressure 115/68, pulse 104, temperature 97.6 °F (36.4 °C), temperature source Temporal, weight 193 lb 9.6 oz (87.8 kg), last menstrual period 2020, unknown if currently breastfeeding. The patient was seen and evaluated. There was positive fetal movements. No contractions or leakage of fluid. Signs and symptoms of  labor as well as labor were reviewed. The S/S of Pre-Eclampsia were reviewed with the patient in detail. She is to report any of these if they occur. She currently denies any of these. The patient is RH positive Rhogam Ordered: Not indicated. Patient Active Problem List    Diagnosis Date Noted    Prenatal care, subsequent pregnancy, second trimester 2016     Priority: Medium    Tobacco smoking complicating pregnancy in second trimester 2016     Priority: Medium    19 weeks gestation of pregnancy 2020    Weeks of gestation of pregnancy not specified 2020    Cigarette smoker 2016        Diagnosis Orders   1. Prenatal care, subsequent pregnancy, second trimester  44687 - Venipuncture    US OB 14 PLUS WEEKS SINGLE OR FIRST GESTATION    US OB TRANSVAGINAL   2. 19 weeks gestation of pregnancy  80926 - Venipuncture    US OB 14 PLUS WEEKS SINGLE OR FIRST GESTATION    US OB TRANSVAGINAL       Patient doing well. Fetal heart rate auscultated at 152 bpm and fundal height is 19 cm. today. Quad screen was done today. Patient advised to continue taking prenatal vitamins and to rest as necessary. A second trimester ultrasound and cervical length ultrasound were ordered today and patient was advised to schedule an appointment to have these done between 21 to 22 gestational weeks. The patient will return to the office for her next visit in 4 weeks. See antepartum flow sheet. Gela Parker am scribing for, and in the presence of Dr. Xavier Shea. Electronically signed by: Dickson Caraballo 8/28/20 10:11 AM   I agree to the above documentation placed by my scribe Dickson Caraballo. I reviewed the scribe's note and agree with the documented findings and plan of care. Any areas of disagreement are noted on the chart. I have personally evaluated this patient. Additional findings are as noted. I agree with the chief complaint, past medical history, past surgical history, allergies, medications, social and family history as documented unless otherwise noted below.      Electronically signed by Xavier Shea MD on 8/29/2020 at 2:12 PM

## 2020-08-29 LAB
AFP INTERPRETATION: NORMAL
AFP MOM: 1.21
AFP QUAD INTERPRETATION: NORMAL
AFP SPECIMEN: NORMAL
AFP: 51 NG/ML
DATE OF BIRTH: NORMAL
DATING METHOD: NORMAL
DETERMINED BY: NORMAL
DIABETIC: NEGATIVE
DIMERIC INHIBIN A: 295 PG/ML
DUE DATE: NORMAL
ESTIMATED DUE DATE: NORMAL
FAMILY HISTORY NTD: NEGATIVE
GESTATIONAL AGE: NORMAL
HISTORY OF ANEUPLOIDY?: NORMAL
IN VITRO FERTILIZATION: NORMAL
INHIBIN A MOM: 1.92
INSULIN REQ DIABETES: NO
LAST MENSTRUAL PERIOD: NORMAL
MATERNAL AGE AT EDD: 25.3 YR
MATERNAL WEIGHT: 193
MOM FOR HCG, 2ND TRIMESTER: 2.48
MONOCHORIONIC TWINS: NORMAL
NUMBER OF FETUSES: NORMAL
PATIENT WEIGHT UNITS: NORMAL
PATIENT WEIGHT: NORMAL
PATIENT'S HCG, TRI 2: NORMAL IU/L
PREV TRISOMY PREG: NORMAL
RACE (MATERNAL): NORMAL
RACE: NORMAL
REPEAT SPECIMEN?: NORMAL
SMOKING: NORMAL
SMOKING: NORMAL
UE3 MOM: 0.65
UE3 VALUE: 1.23 NG/ML
VALPROIC/CARBAMAZEP: NORMAL
ZZ NTE CLEAN UP: HISTORY: NO

## 2020-08-31 LAB — CYTOLOGY REPORT: NORMAL

## 2020-09-21 ENCOUNTER — ROUTINE PRENATAL (OUTPATIENT)
Dept: OBGYN CLINIC | Age: 25
End: 2020-09-21
Payer: MEDICARE

## 2020-09-21 VITALS
BODY MASS INDEX: 35.48 KG/M2 | HEART RATE: 88 BPM | DIASTOLIC BLOOD PRESSURE: 76 MMHG | SYSTOLIC BLOOD PRESSURE: 113 MMHG | WEIGHT: 194 LBS

## 2020-09-21 PROBLEM — Z3A.23 23 WEEKS GESTATION OF PREGNANCY: Status: ACTIVE | Noted: 2020-09-21

## 2020-09-21 PROBLEM — Z3A.19 19 WEEKS GESTATION OF PREGNANCY: Status: RESOLVED | Noted: 2020-08-25 | Resolved: 2020-09-21

## 2020-09-21 PROCEDURE — 4004F PT TOBACCO SCREEN RCVD TLK: CPT | Performed by: SPECIALIST

## 2020-09-21 PROCEDURE — G8427 DOCREV CUR MEDS BY ELIG CLIN: HCPCS | Performed by: SPECIALIST

## 2020-09-21 PROCEDURE — G8417 CALC BMI ABV UP PARAM F/U: HCPCS | Performed by: SPECIALIST

## 2020-09-21 PROCEDURE — 99213 OFFICE O/P EST LOW 20 MIN: CPT | Performed by: SPECIALIST

## 2020-09-21 NOTE — PROGRESS NOTES
Chris Torres is a 25 y.o. female 23w0d        OB History    Para Term  AB Living   3 2 2 0 0 2   SAB TAB Ectopic Molar Multiple Live Births   0 0 0   0 2      # Outcome Date GA Lbr [place]/2nd Weight Sex Delivery Anes PTL Lv   3 Current            2 Term 16 40w0d  6 lb 14 oz (3.118 kg) M Vag-Spont   JUANA   1 Term 14 40w0d  7 lb (3.175 kg) F Vag-Spont EPI N JUANA       Chief Complaint   Patient presents with    Routine Prenatal Visit        Pulse 88, weight 194 lb (88 kg), last menstrual period 2020, unknown if currently breastfeeding. The patient was seen and evaluated. There was positive fetal movements. No contractions or leakage of fluid. Signs and symptoms of  labor as well as labor were reviewed. The patient's anatomy ultrasound has been completed and reviewed with patient. The S/S of Pre-Eclampsia were reviewed with the patient in detail. She is to report any of these if they occur. She currently denies any of these. The patient is RH positive Rhogam Ordered: Not indicated. Patient Active Problem List    Diagnosis Date Noted    Prenatal care, subsequent pregnancy, second trimester 2016     Priority: Medium    Tobacco smoking complicating pregnancy in second trimester 2016     Priority: Medium    23 weeks gestation of pregnancy 2020    Weeks of gestation of pregnancy not specified 2020    Cigarette smoker 2016      Diagnosis Orders   1. 23 weeks gestation of pregnancy     2. Prenatal care, subsequent pregnancy, second trimester       Patient doing well. Fetal heart rate auscultated at 145 bpm and fundal height is 23 cm. today. Patient advised to continue taking prenatal vitamins and to rest as necessary. 28 week labs have been ordered today including 1 hour glucose testing and CBC. Patient was advised that this will be done between 27 and 28 gestational weeks.      The patient will return to the office for her next visit in 4 weeks. See antepartum flow sheet. Reshma Oneill am scribing for, and in the presence of Dr. Luciano Mitchell. Electronically signed by: Aileen Phillips 9/21/20 1:32 PM   I agree to the above documentation placed by my scribe Aileen Phillips. I reviewed the scribe's note and agree with the documented findings and plan of care. Any areas of disagreement are noted on the chart. I have personally evaluated this patient. Additional findings are as noted. I agree with the chief complaint, past medical history, past surgical history, allergies, medications, social and family history as documented unless otherwise noted below.      Electronically signed by Luciano Mitchell MD on 9/22/2020 at 9:36 AM

## 2020-10-13 ENCOUNTER — ROUTINE PRENATAL (OUTPATIENT)
Dept: OBGYN CLINIC | Age: 25
End: 2020-10-13
Payer: MEDICARE

## 2020-10-13 VITALS
SYSTOLIC BLOOD PRESSURE: 127 MMHG | WEIGHT: 194 LBS | DIASTOLIC BLOOD PRESSURE: 82 MMHG | HEART RATE: 98 BPM | BODY MASS INDEX: 35.48 KG/M2

## 2020-10-13 PROBLEM — Z3A.27 27 WEEKS GESTATION OF PREGNANCY: Status: ACTIVE | Noted: 2020-10-13

## 2020-10-13 PROCEDURE — G8417 CALC BMI ABV UP PARAM F/U: HCPCS | Performed by: SPECIALIST

## 2020-10-13 PROCEDURE — 99213 OFFICE O/P EST LOW 20 MIN: CPT | Performed by: SPECIALIST

## 2020-10-13 PROCEDURE — 4004F PT TOBACCO SCREEN RCVD TLK: CPT | Performed by: SPECIALIST

## 2020-10-13 PROCEDURE — G8427 DOCREV CUR MEDS BY ELIG CLIN: HCPCS | Performed by: SPECIALIST

## 2020-10-13 PROCEDURE — G8484 FLU IMMUNIZE NO ADMIN: HCPCS | Performed by: SPECIALIST

## 2020-10-13 NOTE — PROGRESS NOTES
Cody Bravo is a 25 y.o. female 26w1d        OB History    Para Term  AB Living   3 2 2 0 0 2   SAB TAB Ectopic Molar Multiple Live Births   0 0 0   0 2      # Outcome Date GA Lbr [place]/2nd Weight Sex Delivery Anes PTL Lv   3 Current            2 Term 16 40w0d  6 lb 14 oz (3.118 kg) M Vag-Spont   JUANA   1 Term 14 40w0d  7 lb (3.175 kg) F Vag-Spont EPI N JUANA       Chief Complaint   Patient presents with    Routine Prenatal Visit     patient is 26w pregnant and here for prenatal visit         Blood pressure 127/82, pulse 98, weight 194 lb (88 kg), last menstrual period 2020, unknown if currently breastfeeding. The patient was seen and evaluated. There was positive fetal movements. No contractions or leakage of fluid. Signs and symptoms of  labor as well as labor were reviewed. The patient's anatomy ultrasound has been completed and reviewed with patient. The S/S of Pre-Eclampsia were reviewed with the patient in detail. She is to report any of these if they occur. She currently denies any of these. The patient is RH positive Rhogam Ordered: Not indicated. Patient Active Problem List    Diagnosis Date Noted    Prenatal care, subsequent pregnancy, second trimester 2016     Priority: Medium    Tobacco smoking complicating pregnancy in second trimester 2016     Priority: Medium    27 weeks gestation of pregnancy 10/13/2020    23 weeks gestation of pregnancy 2020   [facility] of gestation of pregnancy not specified 2020    Cigarette smoker 2016        Diagnosis Orders   1. 27 weeks gestation of pregnancy  Glucose tolerance, 1 hour    CBC Auto Differential   2. Tobacco smoking complicating pregnancy in second trimester     3. Prenatal care, subsequent pregnancy, second trimester       Patient doing well and reports very active fetal movement. Fetal heart rate auscultated at 152 bpm and fundal height is 25 cm. today.   Patient advised to continue taking prenatal vitamins and to rest as necessary. 28 week labs have been ordered today including 1 hour glucose testing and CBC. Patient was advised that this will be done between 27 and 28 gestational weeks. The patient will return to the office for her next visit in 1 week. See antepartum flow sheet. Julita Ibarra am scribing for, and in the presence of Dr. Jacob Sauceda. Electronically signed by: Jose Liriano 10/13/20 2:54 PM   I agree to the above documentation placed by my scribe Jose Liriano. I reviewed the scribe's note and agree with the documented findings and plan of care. Any areas of disagreement are noted on the chart. I have personally evaluated this patient. Additional findings are as noted. I agree with the chief complaint, past medical history, past surgical history, allergies, medications, social and family history as documented unless otherwise noted below.      Electronically signed by Jacob Sauceda MD on 10/14/2020 at 2:56 AM

## 2020-10-27 ENCOUNTER — HOSPITAL ENCOUNTER (OUTPATIENT)
Age: 25
Setting detail: SPECIMEN
Discharge: HOME | End: 2020-10-27
Payer: MEDICARE

## 2020-10-27 ENCOUNTER — ROUTINE PRENATAL (OUTPATIENT)
Dept: OBGYN CLINIC | Age: 25
End: 2020-10-27
Payer: MEDICARE

## 2020-10-27 VITALS
SYSTOLIC BLOOD PRESSURE: 114 MMHG | WEIGHT: 196 LBS | DIASTOLIC BLOOD PRESSURE: 73 MMHG | BODY MASS INDEX: 35.85 KG/M2 | HEART RATE: 105 BPM

## 2020-10-27 LAB
ABSOLUTE EOS #: 0.18 K/UL (ref 0–0.44)
ABSOLUTE IMMATURE GRANULOCYTE: 0.11 K/UL (ref 0–0.3)
ABSOLUTE LYMPH #: 3.37 K/UL (ref 1.1–3.7)
ABSOLUTE MONO #: 0.78 K/UL (ref 0.1–1.2)
BASOPHILS # BLD: 0 % (ref 0–2)
BASOPHILS ABSOLUTE: 0.03 K/UL (ref 0–0.2)
DIFFERENTIAL TYPE: ABNORMAL
EOSINOPHILS RELATIVE PERCENT: 1 % (ref 1–4)
HCT VFR BLD CALC: 36.7 % (ref 36.3–47.1)
HEMOGLOBIN: 11.6 G/DL (ref 11.9–15.1)
IMMATURE GRANULOCYTES: 1 %
LYMPHOCYTES # BLD: 20 % (ref 24–43)
MCH RBC QN AUTO: 30.4 PG (ref 25.2–33.5)
MCHC RBC AUTO-ENTMCNC: 31.6 G/DL (ref 28.4–34.8)
MCV RBC AUTO: 96.1 FL (ref 82.6–102.9)
MONOCYTES # BLD: 5 % (ref 3–12)
NRBC AUTOMATED: 0 PER 100 WBC
PDW BLD-RTO: 13.3 % (ref 11.8–14.4)
PLATELET # BLD: 553 K/UL (ref 138–453)
PLATELET ESTIMATE: ABNORMAL
PMV BLD AUTO: 10 FL (ref 8.1–13.5)
RBC # BLD: 3.82 M/UL (ref 3.95–5.11)
RBC # BLD: ABNORMAL 10*6/UL
SEG NEUTROPHILS: 73 % (ref 36–65)
SEGMENTED NEUTROPHILS ABSOLUTE COUNT: 12.31 K/UL (ref 1.5–8.1)
WBC # BLD: 16.8 K/UL (ref 3.5–11.3)
WBC # BLD: ABNORMAL 10*3/UL

## 2020-10-27 PROCEDURE — 90715 TDAP VACCINE 7 YRS/> IM: CPT | Performed by: CLINICAL NURSE SPECIALIST

## 2020-10-27 PROCEDURE — G8427 DOCREV CUR MEDS BY ELIG CLIN: HCPCS | Performed by: CLINICAL NURSE SPECIALIST

## 2020-10-27 PROCEDURE — 4004F PT TOBACCO SCREEN RCVD TLK: CPT | Performed by: CLINICAL NURSE SPECIALIST

## 2020-10-27 PROCEDURE — 99213 OFFICE O/P EST LOW 20 MIN: CPT | Performed by: CLINICAL NURSE SPECIALIST

## 2020-10-27 PROCEDURE — G8417 CALC BMI ABV UP PARAM F/U: HCPCS | Performed by: CLINICAL NURSE SPECIALIST

## 2020-10-27 PROCEDURE — G8484 FLU IMMUNIZE NO ADMIN: HCPCS | Performed by: CLINICAL NURSE SPECIALIST

## 2020-10-27 PROCEDURE — 90471 IMMUNIZATION ADMIN: CPT | Performed by: CLINICAL NURSE SPECIALIST

## 2020-10-27 RX ORDER — FAMOTIDINE 20 MG/1
20 TABLET, FILM COATED ORAL 2 TIMES DAILY
Qty: 60 TABLET | Refills: 3 | Status: SHIPPED | OUTPATIENT
Start: 2020-10-27 | End: 2021-04-12

## 2020-10-27 NOTE — PROGRESS NOTES
Patient was given TDAP in the Left Deltoid. NDC# 38610-548-74  LOT# [id number]  Exp date- 06/21/2022  Patient's last injection was n/a. Patient's last annual exam was on n/a. Patient tolerated well without difficulty.

## 2020-10-27 NOTE — PATIENT INSTRUCTIONS
Healthwise, Incorporated. Care instructions adapted under license by [facility] ([facility]). If you have questions about a medical condition or this instruction, always ask your healthcare professional. [address]  any warranty or liability for your use of this information. Patient Education        Tdap (Tetanus, Diphtheria, Pertussis) Vaccine: What You Need to Know  Why get vaccinated? Tdap vaccine can prevent tetanus, diphtheria, and pertussis. Diphtheria and pertussis spread from person to person. Tetanus enters the body through cuts or wounds. · TETANUS (T) causes painful stiffening of the muscles. Tetanus can lead to serious health problems, including being unable to open the mouth, having trouble swallowing and breathing, or death. · DIPHTHERIA (D) can lead to difficulty breathing, heart failure, paralysis, or death. · PERTUSSIS (aP), also known as \"whooping cough,\" can cause uncontrollable, violent coughing which makes it hard to breathe, eat, or drink. Pertussis can be extremely serious in babies and young children, causing pneumonia, convulsions, brain damage, or death. In teens and adults, it can cause weight loss, loss of bladder control, passing out, and rib fractures from severe coughing. Tdap vaccine  Tdap is only for children 7 years and older, adolescents, and adults. Adolescents should receive a single dose of Tdap, preferably at age 6 or 15 years. Pregnant women should get a dose of Tdap during every pregnancy, to protect the  from pertussis. Infants are most at risk for severe, life threatening complications from pertussis. Adults who have never received Tdap should get a dose of Tdap. Also, adults should receive a booster dose every 10 years, or earlier in the case of a severe and dirty wound or burn. Booster doses can be either Tdap or Td (a different vaccine that protects against tetanus and diphtheria but not pertussis).   Tdap may be given at the same the Vaccine Adverse Event Reporting System (VAERS). Your health care provider will usually file this report, or you can do it yourself. Visit the VAERS website at www.vaers. hhs.gov or call 8-947.264.4687. VAERS is only for reporting reactions, and VAERS staff do not give medical advice. The National Vaccine Injury Compensation Program  The National Vaccine Injury Compensation Program (VICP) is a federal program that was created to compensate people who may have been injured by certain vaccines. Visit the VICP website at www.[facility].gov/vaccinecompensation or call 8-849.150.9251 to learn about the program and about filing a claim. There is a time limit to file a claim for compensation. How can I learn more? · Ask your health care provider. · Call your local or state health department. · Contact the Centers for Disease Control and Prevention (CDC):  ? Call 9-686.469.2429 (1-800-CDC-INFO) or  ? Visit CDC's website at www.cdc.gov/vaccines  Vaccine Information Statement (Interim)  Tdap (Tetanus, Diphtheria, Pertussis) Vaccine  04/01/2020  [address] 191UA-87  Department of Health and Human Services  Centers for Disease Control and Prevention  Many Vaccine Information Statements are available in Vietnamese and other languages. See www.immunize.org/vis. Muchas hojas de información sobre vacunas están disponibles en español y en otros idiomas. Visite www.immunize.org/vis. Care instructions adapted under license by [facility] [place] ([facility]). If you have questions about a medical condition or this instruction, always ask your healthcare professional. [address] any warranty or liability for your use of this information.

## 2020-10-27 NOTE — PROGRESS NOTES
Maty Joiner is a 22 y.o. female 28w1d, complains of heart burn and she reports that she has tried tums but vomits them. [id number]    OB History    Para Term  AB Living   3 2 2 0 0 2   SAB TAB Ectopic Molar Multiple Live Births   0 0 0   0 2      # Outcome Date GA Lbr [place]/2nd Weight Sex Delivery Anes PTL Lv   3 Current            2 Term 16 40w0d  6 lb 14 oz (3.118 kg) M Vag-Spont   JUANA   1 Term 14 40w0d  7 lb (3.175 kg) F Vag-Spont EPI N JUANA       Blood pressure 114/73, pulse 105, weight 196 lb (88.9 kg), last menstrual period 2020, unknown if currently breastfeeding. The patient was seen and evaluated. There was positive fetal movements. No contractions or leakage of fluid. Signs and symptoms of  labor as well as labor were reviewed. The S/S of Pre-Eclampsia were reviewed with the patient in detail. She is to report any of these if they occur. She currently denies any of these. The patient had her 28 week labs in process. The patient was instructed on fetal kick counts and was given a kick sheet to complete every 8 hours. She was instructed that the baby should move at a minimum of ten times within one hour after a meal. The patient was instructed to lay down on her left side twenty minutes after eating and count movements for up to one hour with a target value of ten movements. She was instructed to notify the office if she did not make that target after two attempts or if after any attempt there was less than four movements. The patient reports that the targets have been made Yes.     Patient Active Problem List    Diagnosis Date Noted    Prenatal care, subsequent pregnancy, second trimester 2016     Priority: Medium    Tobacco smoking complicating pregnancy in second trimester 2016     Priority: Medium    27 weeks gestation of pregnancy 10/13/2020    23 weeks gestation of pregnancy 2020    Weeks of gestation of pregnancy not specified 07/28/2020    Cigarette smoker 01/29/2016        Diagnosis Orders   1. Encounter for supervision of other normal pregnancy in third trimester     2. 28 weeks gestation of pregnancy  Tdap (age 10y-63y) IM (Adacel)   Continue prenatal vitamins, increase water intake and frequent rest periods as needed. Fetal kick counts reviewed. Will start patient on pepcid, encouraged patient to not eat greasy fried foods, avoid very acidic foods, carbonated beverages and eating late and patient verbalized understanding. The patient will return to the office for her next visit in 2 weeks. See antepartum flow sheet.      Electronically signed by: Telma Carson CNP

## 2020-10-28 LAB
GLUCOSE ADMINISTRATION: ABNORMAL
GLUCOSE TOLERANCE SCREEN 50G: 151 MG/DL (ref 70–135)

## 2020-11-09 ENCOUNTER — HOSPITAL ENCOUNTER (OUTPATIENT)
Age: 25
Discharge: HOME | End: 2020-11-09
Payer: MEDICARE

## 2020-11-09 LAB
3 HR GLUCOSE: 133 MG/DL (ref 65–139)
AMOUNT GLUCOSE GIVEN: NORMAL G
GLUCOSE FASTING: 88 MG/DL (ref 65–94)
GLUCOSE TOLERANCE TEST 1 HOUR: 137 MG/DL (ref 65–179)
GLUCOSE TOLERANCE TEST 2 HOUR: 149 MG/DL (ref 65–154)

## 2020-11-09 PROCEDURE — 82951 GLUCOSE TOLERANCE TEST (GTT): CPT

## 2020-11-09 PROCEDURE — 82952 GTT-ADDED SAMPLES: CPT

## 2020-11-09 PROCEDURE — 36415 COLL VENOUS BLD VENIPUNCTURE: CPT

## 2020-11-10 ENCOUNTER — ROUTINE PRENATAL (OUTPATIENT)
Dept: OBGYN CLINIC | Age: 25
End: 2020-11-10
Payer: MEDICARE

## 2020-11-10 VITALS
BODY MASS INDEX: 35.3 KG/M2 | WEIGHT: 193 LBS | DIASTOLIC BLOOD PRESSURE: 75 MMHG | HEART RATE: 88 BPM | SYSTOLIC BLOOD PRESSURE: 130 MMHG

## 2020-11-10 PROCEDURE — G8484 FLU IMMUNIZE NO ADMIN: HCPCS | Performed by: CLINICAL NURSE SPECIALIST

## 2020-11-10 PROCEDURE — G8417 CALC BMI ABV UP PARAM F/U: HCPCS | Performed by: CLINICAL NURSE SPECIALIST

## 2020-11-10 PROCEDURE — 4004F PT TOBACCO SCREEN RCVD TLK: CPT | Performed by: CLINICAL NURSE SPECIALIST

## 2020-11-10 PROCEDURE — 99213 OFFICE O/P EST LOW 20 MIN: CPT | Performed by: CLINICAL NURSE SPECIALIST

## 2020-11-10 PROCEDURE — G8427 DOCREV CUR MEDS BY ELIG CLIN: HCPCS | Performed by: CLINICAL NURSE SPECIALIST

## 2020-11-10 NOTE — PROGRESS NOTES
Polly Montejo is a 22 y.o. female 30w1d        OB History    Para Term  AB Living   3 2 2 0 0 2   SAB TAB Ectopic Molar Multiple Live Births   0 0 0   0 2      # Outcome Date GA Lbr [place]/2nd Weight Sex Delivery Anes PTL Lv   3 Current            2 Term 16 40w0d  6 lb 14 oz (3.118 kg) M Vag-Spont   JUANA   1 Term 14 40w0d  7 lb (3.175 kg) F Vag-Spont EPI N JUANA       Blood pressure 130/75, pulse 88, weight 193 lb (87.5 kg), last menstrual period 2020, unknown if currently breastfeeding. The patient was seen and evaluated. There was positive fetal movements. No contractions or leakage of fluid. Signs and symptoms of  labor as well as labor were reviewed. The S/S of Pre-Eclampsia were reviewed with the patient in detail. She is to report any of these if they occur. She currently denies any of these. The patient had her 28 week labs completed. The patient was instructed on fetal kick counts and was given a kick sheet to complete every 8 hours. She was instructed that the baby should move at a minimum of ten times within one hour after a meal. The patient was instructed to lay down on her left side twenty minutes after eating and count movements for up to one hour with a target value of ten movements. She was instructed to notify the office if she did not make that target after two attempts or if after any attempt there was less than four movements. The patient reports that the targets have been made Yes. Patient Active Problem List    Diagnosis Date Noted    Prenatal care, subsequent pregnancy, second trimester 2016     Priority: Medium    Tobacco smoking complicating pregnancy in third trimester 2016     Priority: Medium    27 weeks gestation of pregnancy 10/13/2020    23 weeks gestation of pregnancy 2020   Norbert Levin of gestation of pregnancy not specified 2020    Cigarette smoker 2016        Diagnosis Orders   1.  Prenatal care in third trimester     2. 30 weeks gestation of pregnancy     3. Tobacco smoking complicating pregnancy in third trimester     Continue prenatal vitamins, increase water intake and frequent rest periods as needed. Fetal kick counts reviewed. The patient will return to the office for her next visit in 2 weeks. See antepartum flow sheet.      Electronically signed by: Shira Naik CNP

## 2020-11-24 ENCOUNTER — ROUTINE PRENATAL (OUTPATIENT)
Dept: OBGYN CLINIC | Age: 25
End: 2020-11-24
Payer: MEDICARE

## 2020-11-24 VITALS
BODY MASS INDEX: 35.67 KG/M2 | HEART RATE: 86 BPM | DIASTOLIC BLOOD PRESSURE: 68 MMHG | SYSTOLIC BLOOD PRESSURE: 118 MMHG | WEIGHT: 195 LBS

## 2020-11-24 PROCEDURE — 4004F PT TOBACCO SCREEN RCVD TLK: CPT | Performed by: CLINICAL NURSE SPECIALIST

## 2020-11-24 PROCEDURE — 99213 OFFICE O/P EST LOW 20 MIN: CPT | Performed by: CLINICAL NURSE SPECIALIST

## 2020-11-24 PROCEDURE — G8427 DOCREV CUR MEDS BY ELIG CLIN: HCPCS | Performed by: CLINICAL NURSE SPECIALIST

## 2020-11-24 PROCEDURE — G8484 FLU IMMUNIZE NO ADMIN: HCPCS | Performed by: CLINICAL NURSE SPECIALIST

## 2020-11-24 PROCEDURE — G8417 CALC BMI ABV UP PARAM F/U: HCPCS | Performed by: CLINICAL NURSE SPECIALIST

## 2020-11-24 RX ORDER — FLUCONAZOLE 100 MG/1
100 TABLET ORAL DAILY
Qty: 7 TABLET | Refills: 0 | Status: SHIPPED | OUTPATIENT
Start: 2020-11-24 | End: 2020-12-01

## 2020-11-24 RX ORDER — METRONIDAZOLE 500 MG/1
500 TABLET ORAL 2 TIMES DAILY
Qty: 14 TABLET | Refills: 0 | Status: SHIPPED | OUTPATIENT
Start: 2020-11-24 | End: 2020-12-01

## 2020-11-24 NOTE — PATIENT INSTRUCTIONS
Patient Education        Counting Your Baby's Kicks: Care Instructions  Your Care Instructions     Counting your baby's kicks is one way your doctor can tell that your baby is healthy. Most women--especially in a first pregnancy--feel their baby move for the first time between 16 and 22 weeks. The movement may feel like flutters rather than kicks. Your baby may move more at certain times of the day. When you are active, you may notice less kicking than when you are resting. At your prenatal visits, your doctor will ask whether the baby is active. In your last trimester, your doctor may ask you to count the number of times you feel your baby move. Follow-up care is a key part of your treatment and safety. Be sure to make and go to all appointments, and call your doctor if you are having problems. It's also a good idea to know your test results and keep a list of the medicines you take. How do you count fetal kicks? · A common method of checking your baby's movement is to count the number of kicks or moves you feel in 1 hour. Ten movements (such as kicks, flutters, or rolls) in 1 hour are normal. Some doctors suggest that you count in the morning until you get to 10 movements. Then you can quit for that day and start again the next day. · Pick your baby's most active time of day to count. This may be any time from morning to evening. · If you do not feel 10 movements in an hour, your baby may be sleeping. Wait for the next hour and count again. When should you call for help? Call your doctor now or seek immediate medical care if:    · You noticed that your baby has stopped moving or is moving much less than normal.   Watch closely for changes in your health, and be sure to contact your doctor if you have any problems. Where can you learn more? Go to [email or web address]://chsierra.[email or web address] org and sign in to your [employer] account.  Enter B909 in the [employer] [facility] box to learn more about \"Counting Your Baby's Kicks: Care Instructions. \"     If you do not have an account, please click on the \"Sign Up Now\" link. Current as of: 2020               Content Version: 12.6   [employer]. Care instructions adapted under license by [facility] ([facility]). If you have questions about a medical condition or this instruction, always ask your healthcare professional. [place] [address] any warranty or liability for your use of this information. Patient Education        Weeks 32 to 29 of Your Pregnancy: Care Instructions  Your Care Instructions     During the last few weeks of your pregnancy, you may have more aches and pains. It's important to rest when you can. Your growing baby is putting more pressure on your bladder. So you may need to urinate more often. Hemorrhoids are also common. These are painful, itchy veins in the rectal area. In the 36th week, most women have a test for group B streptococcus (GBS). GBS is a common bacteria that can live in the vagina and rectum. It can make your baby sick after birth. If you test positive, you will get antibiotics during labor. These will keep your baby from getting the bacteria. You may want to talk with your doctor about banking your baby's umbilical cord blood. This is the blood left in the cord after birth. If you want to save this blood, you must arrange it ahead of time. You can't decide at the last minute. If you haven't already had the Tdap shot during this pregnancy, talk to your doctor about getting it. It will help protect your  against pertussis infection. Follow-up care is a key part of your treatment and safety. Be sure to make and go to all appointments, and call your doctor if you are having problems. It's also a good idea to know your test results and keep a list of the medicines you take. How can you care for yourself at home?   Ease hemorrhoids  · Get more liquids, fruits, vegetables, and fiber in your diet. This will help keep your stools soft. · Avoid sitting for too long. Lie on your left side several times a day. · Clean yourself with soft, moist toilet paper. Or you can use witch hazel pads or personal hygiene pads. · If you are uncomfortable, try ice packs. Or you can sit in a warm sitz bath. Do these for 20 minutes at a time, as needed. · Use hydrocortisone cream for pain and itching. Two examples are Anusol and Preparation H Hydrocortisone. · Ask your doctor about taking an over-the-counter stool softener. Consider breastfeeding  · Experts recommend that women breastfeed for 1 year or longer. · Breast milk may help protect your child from some health problems.  babies are less likely than formula-fed babies to:  ? Get ear infections, colds, diarrhea, and pneumonia. ? Be obese or get diabetes later in life. · Women who breastfeed have less bleeding after the birth. Their uteruses also shrink back faster. · Some women who breastfeed lose weight faster. Making milk burns calories. · Breastfeeding can lower your risk of breast cancer, ovarian cancer, and osteoporosis. Decide about circumcision for boys  · As you make this decision, it may help to think about your personal, Catholic, and family traditions. You get to decide if you will keep your son's penis natural or if he will be circumcised. · If you decide that you would like to have your baby circumcised, talk with your doctor. You can share your concerns about pain. And you can discuss your preferences for anesthesia. Where can you learn more? Go to [email or web address].[email or web address] org and sign in to your [employer] account. Enter Z816 in the [facility] box to learn more about \"Weeks 32 to 34 of Your Pregnancy: Care Instructions. \"     If you do not have an account, please click on the \"Sign Up Now\" link.   Current as of: February 11, 2020               Content Version: 12.6  © 0128-6670 Healthwise, Incorporated. Care instructions adapted under license by [facility]. If you have questions about a medical condition or this instruction, always ask your healthcare professional. [place] [address] any warranty or liability for your use of this information.

## 2020-11-24 NOTE — PROGRESS NOTES
Veronica Ocasio is a 22 y.o. female 32w1d, complains of discharge and irritation        OB History    Para Term  AB Living   3 2 2 0 0 2   SAB TAB Ectopic Molar Multiple Live Births   0 0 0   0 2      # Outcome Date GA Lbr [place]/2nd Weight Sex Delivery Anes PTL Lv   3 Current            2 Term 16 40w0d  6 lb 14 oz (3.118 kg) M Vag-Spont   JUANA   1 Term 14 40w0d  7 lb (3.175 kg) F Vag-Spont EPI N JUANA       Blood pressure 118/68, pulse 86, weight 195 lb (88.5 kg), last menstrual period 2020, unknown if currently breastfeeding. The patient was seen and evaluated. There was positive fetal movements. No contractions or leakage of fluid. Signs and symptoms of  labor as well as labor were reviewed. The S/S of Pre-Eclampsia were reviewed with the patient in detail. She is to report any of these if they occur. She currently denies any of these. The patient had her 28 week labs completed. The patient was instructed on fetal kick counts and was given a kick sheet to complete every 8 hours. She was instructed that the baby should move at a minimum of ten times within one hour after a meal. The patient was instructed to lay down on her left side twenty minutes after eating and count movements for up to one hour with a target value of ten movements. She was instructed to notify the office if she did not make that target after two attempts or if after any attempt there was less than four movements. The patient reports that the targets have been made Yes.     Patient Active Problem List    Diagnosis Date Noted    Prenatal care, subsequent pregnancy, second trimester 2016     Priority: Medium    Tobacco smoking complicating pregnancy in third trimester 2016     Priority: Medium    27 weeks gestation of pregnancy 10/13/2020    23 weeks gestation of pregnancy 2020   [address] [place] of gestation of pregnancy not specified 2020    Cigarette smoker 2016 Diagnosis Orders   1. Encounter for supervision of other normal pregnancy in third trimester     2. Prenatal care in third trimester     3. 32 weeks gestation of pregnancy     Continue prenatal vitamins, increase water intake and frequent rest periods as needed. Fetal kick counts reviewed. The patient will return to the office for her next visit in 2 weeks. See antepartum flow sheet.      Electronically signed by: Summer Sanford CNP

## 2020-12-08 ENCOUNTER — ROUTINE PRENATAL (OUTPATIENT)
Dept: OBGYN CLINIC | Age: 25
End: 2020-12-08
Payer: MEDICARE

## 2020-12-08 VITALS
SYSTOLIC BLOOD PRESSURE: 126 MMHG | TEMPERATURE: 97.3 F | HEART RATE: 110 BPM | DIASTOLIC BLOOD PRESSURE: 79 MMHG | WEIGHT: 195.8 LBS | BODY MASS INDEX: 35.81 KG/M2

## 2020-12-08 PROCEDURE — 4004F PT TOBACCO SCREEN RCVD TLK: CPT | Performed by: CLINICAL NURSE SPECIALIST

## 2020-12-08 PROCEDURE — 99213 OFFICE O/P EST LOW 20 MIN: CPT | Performed by: CLINICAL NURSE SPECIALIST

## 2020-12-08 PROCEDURE — G8484 FLU IMMUNIZE NO ADMIN: HCPCS | Performed by: CLINICAL NURSE SPECIALIST

## 2020-12-08 PROCEDURE — G8417 CALC BMI ABV UP PARAM F/U: HCPCS | Performed by: CLINICAL NURSE SPECIALIST

## 2020-12-08 PROCEDURE — G8427 DOCREV CUR MEDS BY ELIG CLIN: HCPCS | Performed by: CLINICAL NURSE SPECIALIST

## 2020-12-08 NOTE — PATIENT INSTRUCTIONS
of the pain. Others make your lower body numb so that you won't feel pain. · Be sure to tell your doctor about your pain medicine choice before you start labor or very early in your labor. You may be able to change your mind as labor progresses. · Rarely, a woman is put to sleep by medicine given through a mask or an IV. Labor and delivery  · The first stage of labor has three parts: early, active, and transition. ? Most women have early labor at home. You can stay busy or rest, eat light snacks, drink clear fluids, and start counting contractions. ? When talking during a contraction gets hard, you may be moving to active labor. During active labor, you should head for the hospital if you are not there already. ? You are in active labor when contractions come every 3 to 4 minutes and last about 60 seconds. Your cervix is opening more rapidly. ? If your water breaks, contractions will come faster and stronger. ? During transition, your cervix is stretching, and contractions are coming more rapidly. ? You may want to push, but your cervix might not be ready. Your doctor will tell you when to push. · The second stage starts when your cervix is completely opened and you are ready to push. ? Contractions are very strong to push the baby down the birth canal.  ? You will feel the urge to push. You may feel like you need to have a bowel movement. ? You may be coached to push with contractions. These contractions will be very strong, but you will not have them as often. You can get a little rest between contractions. ? You may be emotional and irritable. You may not be aware of what is going on around you.  ? One last push, and your baby is born. · The third stage is when a few more contractions push out the placenta. This may take 30 minutes or less. · The fourth stage is the welcome recovery. You may feel overwhelmed with emotions and exhausted but alert. This is a good time to start breastfeeding.   Where can you learn more? Go to [email or web address] and sign in to your [employer] account. Enter V107 in the [facility] box to learn more about \"Weeks 34 to 36 of Your Pregnancy: Care Instructions. \"     If you do not have an account, please click on the \"Sign Up Now\" link. Current as of: February 11, 2020               Content Version: 12.6  © 2006-2020 [employer]. Care instructions adapted under license by [facility] ([facility]). If you have questions about a medical condition or this instruction, always ask your healthcare professional. [address] any warranty or liability for your use of this information.

## 2020-12-08 NOTE — PROGRESS NOTES
Wicho Santoro is a 22 y.o. female 34w1d        OB History    Para Term  AB Living   3 2 2 0 0 2   SAB TAB Ectopic Molar Multiple Live Births   0 0 0   0 2      # Outcome Date GA Lbr [place]/2nd Weight Sex Delivery Anes PTL Lv   3 Current            2 Term 16 40w0d  6 lb 14 oz (3.118 kg) M Vag-Spont   JUANA   1 Term 14 40w0d  7 lb (3.175 kg) F Vag-Spont EPI N JUANA       Blood pressure 126/79, pulse 110, temperature 97.3 °F (36.3 °C), temperature source Temporal, weight 195 lb 12.8 oz (88.8 kg), last menstrual period 2020, unknown if currently breastfeeding. The patient was seen and evaluated. There was positive fetal movements. No contractions or leakage of fluid. Signs and symptoms of  labor as well as labor were reviewed. The S/S of Pre-Eclampsia were reviewed with the patient in detail. She is to report any of these if they occur. She currently denies any of these. The patient had her 28 week labs completed. The patient was instructed on fetal kick counts and was given a kick sheet to complete every 8 hours. She was instructed that the baby should move at a minimum of ten times within one hour after a meal. The patient was instructed to lay down on her left side twenty minutes after eating and count movements for up to one hour with a target value of ten movements. She was instructed to notify the office if she did not make that target after two attempts or if after any attempt there was less than four movements. The patient reports that the targets have been made Yes.     Patient Active Problem List    Diagnosis Date Noted    Prenatal care, subsequent pregnancy, second trimester 2016     Priority: Medium    Tobacco smoking complicating pregnancy in third trimester 2016     Priority: Medium    27 weeks gestation of pregnancy 10/13/2020    23 weeks gestation of pregnancy 2020   Acacia Ayers of gestation of pregnancy not specified 2020  Cigarette smoker 01/29/2016        Diagnosis Orders   1. Encounter for supervision of other normal pregnancy in third trimester     2. 34 weeks gestation of pregnancy     Continue prenatal vitamins, increase water intake and frequent rest periods as needed. Fetal kick counts reviewed. The patient will return to the office for her next visit in 2 weeks. See antepartum flow sheet.      Electronically signed by: Jade Herbert CNP

## 2020-12-22 ENCOUNTER — HOSPITAL ENCOUNTER (OUTPATIENT)
Age: 25
Setting detail: SPECIMEN
Discharge: HOME | End: 2020-12-22
Payer: MEDICARE

## 2020-12-22 ENCOUNTER — ROUTINE PRENATAL (OUTPATIENT)
Dept: OBGYN CLINIC | Age: 25
End: 2020-12-22
Payer: MEDICARE

## 2020-12-22 VITALS
HEART RATE: 88 BPM | DIASTOLIC BLOOD PRESSURE: 68 MMHG | WEIGHT: 197 LBS | SYSTOLIC BLOOD PRESSURE: 112 MMHG | BODY MASS INDEX: 36.03 KG/M2

## 2020-12-22 PROBLEM — Z3A.36 36 WEEKS GESTATION OF PREGNANCY: Status: ACTIVE | Noted: 2020-12-22

## 2020-12-22 PROBLEM — O36.8390 ANTEPARTUM FETAL TACHYCARDIA AFFECTING CARE OF MOTHER: Status: ACTIVE | Noted: 2020-12-22

## 2020-12-22 LAB
ACCELERATIONS > 10BPM: NORMAL
ACCELERATIONS > 15 BPM: 3
ACOUSTIC STIMULATION: NORMAL
DECELERATIONS: NORMAL
FHR VARIABILITIES: NORMAL
NST ASSESSMENT: REACTIVE
NST BASELINE: 150
NST DURATION: 20 MINUTES
NST INDICATIONS: NORMAL
NST LOCATIONS: NORMAL
NST READ BY: NORMAL
NST RETURN: NORMAL
UTERINE ACTIVITY: NO

## 2020-12-22 PROCEDURE — G8484 FLU IMMUNIZE NO ADMIN: HCPCS | Performed by: SPECIALIST

## 2020-12-22 PROCEDURE — G8417 CALC BMI ABV UP PARAM F/U: HCPCS | Performed by: SPECIALIST

## 2020-12-22 PROCEDURE — 4004F PT TOBACCO SCREEN RCVD TLK: CPT | Performed by: SPECIALIST

## 2020-12-22 PROCEDURE — 99213 OFFICE O/P EST LOW 20 MIN: CPT | Performed by: SPECIALIST

## 2020-12-22 PROCEDURE — 59025 FETAL NON-STRESS TEST: CPT | Performed by: SPECIALIST

## 2020-12-22 PROCEDURE — G8427 DOCREV CUR MEDS BY ELIG CLIN: HCPCS | Performed by: SPECIALIST

## 2020-12-22 NOTE — PROGRESS NOTES
Jerel Han is a 22 y.o. female 36w1d        OB History    Para Term  AB Living   3 2 2 0 0 2   SAB TAB Ectopic Molar Multiple Live Births   0 0 0   0 2      # Outcome Date GA Lbr [place]/2nd Weight Sex Delivery Anes PTL Lv   3 Current            2 Term 16 40w0d  6 lb 14 oz (3.118 kg) M Vag-Spont   JUANA   1 Term 14 40w0d  7 lb (3.175 kg) F Vag-Spont EPI N JUANA       Chief Complaint   Patient presents with    Routine Prenatal Visit        Blood pressure 112/68, pulse 88, weight 197 lb (89.4 kg), last menstrual period 2020, unknown if currently breastfeeding. The patient was seen and evaluated. There was positive fetal movements. No contractions or leakage of fluid. Signs and symptoms of labor were reviewed. The S/S of Pre-Eclampsia were reviewed with the patient in detail. She is to report any of these if they occur. She currently denies any of these. The patient was instructed on fetal kick counts and was given a kick sheet to complete every 8 hours. She was instructed that the baby should move at a minimum of ten times within one hour after a meal. The patient was instructed to lay down on her left side twenty minutes after eating and count movements for up to one hour with a target value of ten movements. She was instructed to notify the office if she did not make that target after two attempts or if after any attempt there was less than four movements. The patient reports that the targets have been made Yes. Allergies: Allergies as of 2020    (No Known Allergies)       Group Beta Strep collection was completed. Yes    GC and Chlamydia were previously preformed and reviewed. The results are negative. She has been instructed to call the office at anytime prior to going into the hospital so the on-call physician may direct her to the appropriate facility for care.  Exceptions were reviewed including but not limited to: Decreased fetal movement, findings are as noted. I agree with the chief complaint, past medical history, past surgical history, allergies, medications, social and family history as documented unless otherwise noted below.      Electronically signed by Blane Nguyen MD on 12/23/2020 at 3:01 AM

## 2020-12-23 LAB
DIRECT EXAM: NORMAL
Lab: NORMAL
SPECIMEN DESCRIPTION: NORMAL

## 2020-12-29 ENCOUNTER — ROUTINE PRENATAL (OUTPATIENT)
Dept: OBGYN CLINIC | Age: 25
End: 2020-12-29
Payer: MEDICARE

## 2020-12-29 VITALS
HEART RATE: 120 BPM | SYSTOLIC BLOOD PRESSURE: 121 MMHG | BODY MASS INDEX: 36.4 KG/M2 | DIASTOLIC BLOOD PRESSURE: 79 MMHG | WEIGHT: 199 LBS

## 2020-12-29 PROBLEM — Z3A.37 37 WEEKS GESTATION OF PREGNANCY: Status: ACTIVE | Noted: 2020-12-29

## 2020-12-29 PROBLEM — Z3A.23 23 WEEKS GESTATION OF PREGNANCY: Status: RESOLVED | Noted: 2020-09-21 | Resolved: 2020-12-29

## 2020-12-29 PROBLEM — Z3A.27 27 WEEKS GESTATION OF PREGNANCY: Status: RESOLVED | Noted: 2020-10-13 | Resolved: 2020-12-29

## 2020-12-29 PROCEDURE — G8427 DOCREV CUR MEDS BY ELIG CLIN: HCPCS | Performed by: SPECIALIST

## 2020-12-29 PROCEDURE — G8484 FLU IMMUNIZE NO ADMIN: HCPCS | Performed by: SPECIALIST

## 2020-12-29 PROCEDURE — 4004F PT TOBACCO SCREEN RCVD TLK: CPT | Performed by: SPECIALIST

## 2020-12-29 PROCEDURE — G8417 CALC BMI ABV UP PARAM F/U: HCPCS | Performed by: SPECIALIST

## 2020-12-29 PROCEDURE — 99213 OFFICE O/P EST LOW 20 MIN: CPT | Performed by: SPECIALIST

## 2020-12-29 ASSESSMENT — PATIENT HEALTH QUESTIONNAIRE - PHQ9
SUM OF ALL RESPONSES TO PHQ9 QUESTIONS 1 & 2: 0
SUM OF ALL RESPONSES TO PHQ QUESTIONS 1-9: 0
1. LITTLE INTEREST OR PLEASURE IN DOING THINGS: 0
SUM OF ALL RESPONSES TO PHQ QUESTIONS 1-9: 0
SUM OF ALL RESPONSES TO PHQ QUESTIONS 1-9: 0
2. FEELING DOWN, DEPRESSED OR HOPELESS: 0

## 2021-01-04 ENCOUNTER — ROUTINE PRENATAL (OUTPATIENT)
Dept: OBGYN CLINIC | Age: 26
End: 2021-01-04
Payer: MEDICARE

## 2021-01-04 VITALS
DIASTOLIC BLOOD PRESSURE: 86 MMHG | BODY MASS INDEX: 36.58 KG/M2 | SYSTOLIC BLOOD PRESSURE: 135 MMHG | HEART RATE: 104 BPM | TEMPERATURE: 97.4 F | WEIGHT: 200 LBS

## 2021-01-04 DIAGNOSIS — Z3A.38 38 WEEKS GESTATION OF PREGNANCY: Primary | ICD-10-CM

## 2021-01-04 DIAGNOSIS — Z34.83 PRENATAL CARE, SUBSEQUENT PREGNANCY, THIRD TRIMESTER: ICD-10-CM

## 2021-01-04 LAB
ACCELERATIONS > 10BPM: NORMAL
ACCELERATIONS > 15 BPM: 2
ACOUSTIC STIMULATION: NO
DECELERATIONS: NORMAL
FHR VARIABILITIES: NORMAL
NST ASSESSMENT: REACTIVE
NST BASELINE: 150
NST DURATION: 20 MINUTES
NST INDICATIONS: NORMAL
NST LOCATIONS: NORMAL
NST READ BY: NORMAL
NST RETURN: NORMAL
UTERINE ACTIVITY: NO

## 2021-01-04 PROCEDURE — 4004F PT TOBACCO SCREEN RCVD TLK: CPT | Performed by: SPECIALIST

## 2021-01-04 PROCEDURE — G8427 DOCREV CUR MEDS BY ELIG CLIN: HCPCS | Performed by: SPECIALIST

## 2021-01-04 PROCEDURE — G8484 FLU IMMUNIZE NO ADMIN: HCPCS | Performed by: SPECIALIST

## 2021-01-04 PROCEDURE — 99213 OFFICE O/P EST LOW 20 MIN: CPT | Performed by: SPECIALIST

## 2021-01-04 PROCEDURE — G8417 CALC BMI ABV UP PARAM F/U: HCPCS | Performed by: SPECIALIST

## 2021-01-04 PROCEDURE — 59025 FETAL NON-STRESS TEST: CPT | Performed by: SPECIALIST

## 2021-01-04 NOTE — PROGRESS NOTES
Yecenia Palmer is a 22 y.o. female 38w0d        OB History    Para Term  AB Living   3 2 2 0 0 2   SAB TAB Ectopic Molar Multiple Live Births   0 0 0   0 2      # Outcome Date GA Lbr [place]/2nd Weight Sex Delivery Anes PTL Lv   3 Current            2 Term 16 40w0d  6 lb 14 oz (3.118 kg) M Vag-Spont   JUANA   1 Term 14 40w0d  7 lb (3.175 kg) F Vag-Spont EPI N JUANA       Chief Complaint   Patient presents with    Routine Prenatal Visit     Patient is 45 weeks gestation and is here for supervision of high risk pregnancy.  High Risk Pregnancy    Non-stress Test        Blood pressure 135/86, pulse 104, temperature 97.4 °F (36.3 °C), temperature source Temporal, weight 200 lb (90.7 kg), last menstrual period 2020, unknown if currently breastfeeding. The patient was seen and evaluated. There was positive fetal movements. No contractions or leakage of fluid. Signs and symptoms of labor were reviewed. The S/S of Pre-Eclampsia were reviewed with the patient in detail. She is to report any of these if they occur. She currently denies any of these. The patient was instructed on fetal kick counts and was given a kick sheet to complete every 8 hours. She was instructed that the baby should move at a minimum of ten times within one hour after a meal. The patient was instructed to lay down on her left side twenty minutes after eating and count movements for up to one hour with a target value of ten movements. She was instructed to notify the office if she did not make that target after two attempts or if after any attempt there was less than four movements. The patient reports that the targets have been made Yes. Allergies: Allergies as of 2021    (No Known Allergies)       Group Beta Strep collection was completed.  Yes    The patient was found to be GBS: negative    Cervical Exam was:   2 cm, 60%, -3    She has been instructed to call the office at anytime prior to going into the hospital so the on-call physician may direct her to the appropriate facility for care. Exceptions were reviewed including but not limited to: Decreased fetal movement, vaginal Bleeding or hemorrhage, trauma, readily expectant delivery, or any instance where she feels 911 should be utilized. Patient Active Problem List    Diagnosis Date Noted    Prenatal care, subsequent pregnancy, third trimester 04/22/2016     Priority: Medium    Tobacco smoking complicating pregnancy in third trimester 01/29/2016     Priority: Medium    37 weeks gestation of pregnancy 12/29/2020    36 weeks gestation of pregnancy 12/22/2020    Fetal tachycardia before the onset of labor 12/22/2020   Cheryl Sims of gestation of pregnancy not specified 07/28/2020    Cigarette smoker 01/29/2016        Diagnosis Orders   1. 38 weeks gestation of pregnancy     2. Prenatal care, subsequent pregnancy, third trimester     3. Fetal tachycardia before the onset of labor         Patient doing well. NST done today is reactive (see procedures tab for detail result). Fetal heart rate per NST baseline is 150 bpm and fundal height is 36 cm. today. Patient advised to continue taking prenatal vitamins and to rest as necessary. The patient will return to the office for her next visit in 3 days. See antepartum flow sheet. Kenan Jaimes am scribing for, and in the presence of Dr. Diamond Garcia. Electronically signed by: Alan Ramos 1/4/21 3:55 PM   I agree to the above documentation placed by my scribe Alan Ramos. I reviewed the scribe's note and agree with the documented findings and plan of care. Any areas of disagreement are noted on the chart. I have personally evaluated this patient. Additional findings are as noted. I agree with the chief complaint, past medical history, past surgical history, allergies, medications, social and family history as documented unless otherwise noted below.      Electronically signed by Laina Santana MD on 1/5/2021 at 3:55 AM

## 2021-01-08 ENCOUNTER — HOSPITAL ENCOUNTER (OUTPATIENT)
Dept: LAB | Age: 26
Setting detail: SPECIMEN
Discharge: HOME | End: 2021-01-08
Payer: MEDICARE

## 2021-01-08 DIAGNOSIS — Z01.818 PREOP TESTING: Primary | ICD-10-CM

## 2021-01-08 PROCEDURE — U0003 INFECTIOUS AGENT DETECTION BY NUCLEIC ACID (DNA OR RNA); SEVERE ACUTE RESPIRATORY SYNDROME CORONAVIRUS 2 (SARS-COV-2) (CORONAVIRUS DISEASE [COVID-19]), AMPLIFIED PROBE TECHNIQUE, MAKING USE OF HIGH THROUGHPUT TECHNOLOGIES AS DESCRIBED BY CMS-2020-01-R: HCPCS

## 2021-01-08 PROCEDURE — U0005 INFEC AGEN DETEC AMPLI PROBE: HCPCS

## 2021-01-10 LAB
SARS-COV-2, RAPID: NORMAL
SARS-COV-2: NORMAL
SARS-COV-2: NOT DETECTED
SOURCE: NORMAL

## 2021-01-11 ENCOUNTER — ROUTINE PRENATAL (OUTPATIENT)
Dept: OBGYN CLINIC | Age: 26
End: 2021-01-11
Payer: MEDICARE

## 2021-01-11 VITALS
HEART RATE: 126 BPM | WEIGHT: 200.4 LBS | BODY MASS INDEX: 36.65 KG/M2 | DIASTOLIC BLOOD PRESSURE: 82 MMHG | SYSTOLIC BLOOD PRESSURE: 125 MMHG

## 2021-01-11 DIAGNOSIS — O36.8390 FETAL TACHYCARDIA AFFECTING MANAGEMENT OF MOTHER: ICD-10-CM

## 2021-01-11 DIAGNOSIS — Z3A.39 39 WEEKS GESTATION OF PREGNANCY: Primary | ICD-10-CM

## 2021-01-11 DIAGNOSIS — O99.333 TOBACCO SMOKING COMPLICATING PREGNANCY IN THIRD TRIMESTER: ICD-10-CM

## 2021-01-11 DIAGNOSIS — Z34.83 PRENATAL CARE, SUBSEQUENT PREGNANCY, THIRD TRIMESTER: ICD-10-CM

## 2021-01-11 PROBLEM — Z3A.37 37 WEEKS GESTATION OF PREGNANCY: Status: RESOLVED | Noted: 2020-12-29 | Resolved: 2021-01-11

## 2021-01-11 PROBLEM — Z3A.36 36 WEEKS GESTATION OF PREGNANCY: Status: RESOLVED | Noted: 2020-12-22 | Resolved: 2021-01-11

## 2021-01-11 LAB
ACCELERATIONS > 10BPM: NORMAL
ACCELERATIONS > 15 BPM: 3
ACOUSTIC STIMULATION: NO
DECELERATIONS: NORMAL
FHR VARIABILITIES: NORMAL
NST ASSESSMENT: REACTIVE
NST BASELINE: 150
NST DURATION: 20 MINUTES
NST INDICATIONS: NORMAL
NST LOCATIONS: NORMAL
NST READ BY: NORMAL
NST RETURN: NORMAL
UTERINE ACTIVITY: NO

## 2021-01-11 PROCEDURE — G8417 CALC BMI ABV UP PARAM F/U: HCPCS | Performed by: SPECIALIST

## 2021-01-11 PROCEDURE — 59025 FETAL NON-STRESS TEST: CPT | Performed by: SPECIALIST

## 2021-01-11 PROCEDURE — G8484 FLU IMMUNIZE NO ADMIN: HCPCS | Performed by: SPECIALIST

## 2021-01-11 PROCEDURE — G8427 DOCREV CUR MEDS BY ELIG CLIN: HCPCS | Performed by: SPECIALIST

## 2021-01-11 PROCEDURE — 4004F PT TOBACCO SCREEN RCVD TLK: CPT | Performed by: SPECIALIST

## 2021-01-11 PROCEDURE — 99213 OFFICE O/P EST LOW 20 MIN: CPT | Performed by: SPECIALIST

## 2021-01-11 NOTE — PROGRESS NOTES
Patient reports intermittent contractions, denies ROM or bloody show. No complaints at this time. Had COVID test-negative.

## 2021-01-11 NOTE — PROGRESS NOTES
Hoa Yuan is a 22 y.o. female 39w0d        OB History    Para Term  AB Living   3 2 2 0 0 2   SAB TAB Ectopic Molar Multiple Live Births   0 0 0   0 2      # Outcome Date GA Lbr [place]/2nd Weight Sex Delivery Anes PTL Lv   3 Current            2 Term 16 40w0d  6 lb 14 oz (3.118 kg) M Vag-Spont   JAUNA   1 Term 14 40w0d  7 lb (3.175 kg) F Vag-Spont EPI N JUANA       No chief complaint on file. Blood pressure 125/82, pulse 126, weight 200 lb 6.4 oz (90.9 kg), last menstrual period 2020, unknown if currently breastfeeding. The patient was seen and evaluated. There was positive fetal movements. No contractions or leakage of fluid. Signs and symptoms of labor were reviewed. The S/S of Pre-Eclampsia were reviewed with the patient in detail. She is to report any of these if they occur. She currently denies any of these. The patient was instructed on fetal kick counts and was given a kick sheet to complete every 8 hours. She was instructed that the baby should move at a minimum of ten times within one hour after a meal. The patient was instructed to lay down on her left side twenty minutes after eating and count movements for up to one hour with a target value of ten movements. She was instructed to notify the office if she did not make that target after two attempts or if after any attempt there was less than four movements. The patient reports that the targets have been made Yes. Allergies: Allergies as of 2021    (No Known Allergies)       Group Beta Strep collection was completed. Yes    The patient was found to be GBS: negative    Cervical Exam was:   2 cm, 60 %    She has been instructed to call the office at anytime prior to going into the hospital so the on-call physician may direct her to the appropriate facility for care.  Exceptions were reviewed including but not limited to: Decreased fetal movement, vaginal Bleeding or hemorrhage, trauma,

## 2021-01-18 ENCOUNTER — ROUTINE PRENATAL (OUTPATIENT)
Dept: OBGYN CLINIC | Age: 26
End: 2021-01-18
Payer: MEDICARE

## 2021-01-18 ENCOUNTER — HOSPITAL ENCOUNTER (INPATIENT)
Age: 26
LOS: 2 days | Discharge: HOME | DRG: 560 | End: 2021-01-20
Attending: SPECIALIST | Admitting: SPECIALIST
Payer: MEDICARE

## 2021-01-18 VITALS
BODY MASS INDEX: 36.58 KG/M2 | WEIGHT: 200 LBS | HEART RATE: 116 BPM | SYSTOLIC BLOOD PRESSURE: 139 MMHG | TEMPERATURE: 97.2 F | DIASTOLIC BLOOD PRESSURE: 93 MMHG

## 2021-01-18 DIAGNOSIS — Z3A.40 40 WEEKS GESTATION OF PREGNANCY: ICD-10-CM

## 2021-01-18 PROBLEM — Z3A.39 39 WEEKS GESTATION OF PREGNANCY: Status: RESOLVED | Noted: 2021-01-11 | Resolved: 2021-01-18

## 2021-01-18 LAB
ABO/RH: NORMAL
ABSOLUTE EOS #: 0.17 K/UL (ref 0–0.44)
ABSOLUTE IMMATURE GRANULOCYTE: 0.13 K/UL (ref 0–0.3)
ABSOLUTE LYMPH #: 3.87 K/UL (ref 1.1–3.7)
ABSOLUTE MONO #: 0.94 K/UL (ref 0.1–1.2)
ALBUMIN SERPL-MCNC: 3.3 G/DL (ref 3.5–5.2)
ALBUMIN/GLOBULIN RATIO: 1 (ref 1–2.5)
ALP BLD-CCNC: 194 U/L (ref 35–104)
ALT SERPL-CCNC: 18 U/L (ref 5–33)
AMPHETAMINE SCREEN URINE: NEGATIVE
ANION GAP SERPL CALCULATED.3IONS-SCNC: 10 MMOL/L (ref 9–17)
ANTIBODY SCREEN: NEGATIVE
ARM BAND NUMBER: NORMAL
AST SERPL-CCNC: 17 U/L
BARBITURATE SCREEN URINE: NEGATIVE
BASOPHILS # BLD: 0 % (ref 0–2)
BASOPHILS ABSOLUTE: 0.05 K/UL (ref 0–0.2)
BENZODIAZEPINE SCREEN, URINE: NEGATIVE
BILIRUB SERPL-MCNC: 0.35 MG/DL (ref 0.3–1.2)
BUN BLDV-MCNC: 6 MG/DL (ref 6–20)
BUN/CREAT BLD: ABNORMAL (ref 9–20)
BUPRENORPHINE URINE: NORMAL
CALCIUM SERPL-MCNC: 9.5 MG/DL (ref 8.6–10.4)
CANNABINOID SCREEN URINE: NEGATIVE
CHLORIDE BLD-SCNC: 102 MMOL/L (ref 98–107)
CO2: 21 MMOL/L (ref 20–31)
COCAINE METABOLITE, URINE: NEGATIVE
CREAT SERPL-MCNC: 0.33 MG/DL (ref 0.5–0.9)
CREATININE URINE: 129.6 MG/DL (ref 28–217)
DIFFERENTIAL TYPE: ABNORMAL
EOSINOPHILS RELATIVE PERCENT: 1 % (ref 1–4)
EXPIRATION DATE: NORMAL
GFR AFRICAN AMERICAN: >60 ML/MIN
GFR NON-AFRICAN AMERICAN: >60 ML/MIN
GFR SERPL CREATININE-BSD FRML MDRD: ABNORMAL ML/MIN/{1.73_M2}
GFR SERPL CREATININE-BSD FRML MDRD: ABNORMAL ML/MIN/{1.73_M2}
GLUCOSE BLD-MCNC: 105 MG/DL (ref 70–99)
HCT VFR BLD CALC: 37.5 % (ref 36.3–47.1)
HEMOGLOBIN: 12.6 G/DL (ref 11.9–15.1)
IMMATURE GRANULOCYTES: 1 %
LYMPHOCYTES # BLD: 23 % (ref 24–43)
MCH RBC QN AUTO: 29.6 PG (ref 25.2–33.5)
MCHC RBC AUTO-ENTMCNC: 33.6 G/DL (ref 28.4–34.8)
MCV RBC AUTO: 88 FL (ref 82.6–102.9)
MDMA URINE: NORMAL
METHADONE SCREEN, URINE: NEGATIVE
METHAMPHETAMINE, URINE: NORMAL
MONOCYTES # BLD: 6 % (ref 3–12)
NRBC AUTOMATED: 0 PER 100 WBC
OPIATES, URINE: NEGATIVE
OXYCODONE SCREEN URINE: NEGATIVE
PDW BLD-RTO: 13.4 % (ref 11.8–14.4)
PHENCYCLIDINE, URINE: NEGATIVE
PLATELET # BLD: 619 K/UL (ref 138–453)
PLATELET ESTIMATE: ABNORMAL
PMV BLD AUTO: 9.7 FL (ref 8.1–13.5)
POTASSIUM SERPL-SCNC: 4.2 MMOL/L (ref 3.7–5.3)
PROPOXYPHENE, URINE: NORMAL
RBC # BLD: 4.26 M/UL (ref 3.95–5.11)
RBC # BLD: ABNORMAL 10*6/UL
SARS-COV-2, RAPID: NOT DETECTED
SARS-COV-2: NORMAL
SARS-COV-2: NORMAL
SEG NEUTROPHILS: 69 % (ref 36–65)
SEGMENTED NEUTROPHILS ABSOLUTE COUNT: 11.7 K/UL (ref 1.5–8.1)
SODIUM BLD-SCNC: 133 MMOL/L (ref 135–144)
SOURCE: NORMAL
T. PALLIDUM, IGG: NONREACTIVE
TEST INFORMATION: NORMAL
TOTAL PROTEIN, URINE: 25 MG/DL
TOTAL PROTEIN: 6.5 G/DL (ref 6.4–8.3)
TRICYCLIC ANTIDEPRESSANTS, UR: NORMAL
URINE TOTAL PROTEIN CREATININE RATIO: 0.19 (ref 0–0.2)
WBC # BLD: 16.9 K/UL (ref 3.5–11.3)
WBC # BLD: ABNORMAL 10*3/UL

## 2021-01-18 PROCEDURE — 85025 COMPLETE CBC W/AUTO DIFF WBC: CPT

## 2021-01-18 PROCEDURE — 86780 TREPONEMA PALLIDUM: CPT

## 2021-01-18 PROCEDURE — 1220000000 HC SEMI PRIVATE OB R&B

## 2021-01-18 PROCEDURE — G8427 DOCREV CUR MEDS BY ELIG CLIN: HCPCS | Performed by: SPECIALIST

## 2021-01-18 PROCEDURE — U0002 COVID-19 LAB TEST NON-CDC: HCPCS

## 2021-01-18 PROCEDURE — 84156 ASSAY OF PROTEIN URINE: CPT

## 2021-01-18 PROCEDURE — 86901 BLOOD TYPING SEROLOGIC RH(D): CPT

## 2021-01-18 PROCEDURE — 80307 DRUG TEST PRSMV CHEM ANLYZR: CPT

## 2021-01-18 PROCEDURE — 99213 OFFICE O/P EST LOW 20 MIN: CPT | Performed by: SPECIALIST

## 2021-01-18 PROCEDURE — 59025 FETAL NON-STRESS TEST: CPT | Performed by: SPECIALIST

## 2021-01-18 PROCEDURE — 86850 RBC ANTIBODY SCREEN: CPT

## 2021-01-18 PROCEDURE — G8417 CALC BMI ABV UP PARAM F/U: HCPCS | Performed by: SPECIALIST

## 2021-01-18 PROCEDURE — 4004F PT TOBACCO SCREEN RCVD TLK: CPT | Performed by: SPECIALIST

## 2021-01-18 PROCEDURE — 3E0P7VZ INTRODUCTION OF HORMONE INTO FEMALE REPRODUCTIVE, VIA NATURAL OR ARTIFICIAL OPENING: ICD-10-PCS | Performed by: SPECIALIST

## 2021-01-18 PROCEDURE — 80053 COMPREHEN METABOLIC PANEL: CPT

## 2021-01-18 PROCEDURE — 59200 INSERT CERVICAL DILATOR: CPT

## 2021-01-18 PROCEDURE — 82570 ASSAY OF URINE CREATININE: CPT

## 2021-01-18 PROCEDURE — 2580000003 HC RX 258: Performed by: STUDENT IN AN ORGANIZED HEALTH CARE EDUCATION/TRAINING PROGRAM

## 2021-01-18 PROCEDURE — 86900 BLOOD TYPING SEROLOGIC ABO: CPT

## 2021-01-18 PROCEDURE — 6370000000 HC RX 637 (ALT 250 FOR IP): Performed by: STUDENT IN AN ORGANIZED HEALTH CARE EDUCATION/TRAINING PROGRAM

## 2021-01-18 PROCEDURE — G8484 FLU IMMUNIZE NO ADMIN: HCPCS | Performed by: SPECIALIST

## 2021-01-18 RX ORDER — LIDOCAINE HYDROCHLORIDE 10 MG/ML
30 INJECTION, SOLUTION EPIDURAL; INFILTRATION; INTRACAUDAL; PERINEURAL PRN
Status: DISCONTINUED | OUTPATIENT
Start: 2021-01-18 | End: 2021-01-19

## 2021-01-18 RX ORDER — ACETAMINOPHEN 500 MG
1000 TABLET ORAL EVERY 6 HOURS PRN
Status: DISCONTINUED | OUTPATIENT
Start: 2021-01-18 | End: 2021-01-19

## 2021-01-18 RX ORDER — SODIUM CHLORIDE 0.9 % (FLUSH) 0.9 %
10 SYRINGE (ML) INJECTION EVERY 12 HOURS SCHEDULED
Status: DISCONTINUED | OUTPATIENT
Start: 2021-01-18 | End: 2021-01-19

## 2021-01-18 RX ORDER — SODIUM CHLORIDE, SODIUM LACTATE, POTASSIUM CHLORIDE, CALCIUM CHLORIDE 600; 310; 30; 20 MG/100ML; MG/100ML; MG/100ML; MG/100ML
INJECTION, SOLUTION INTRAVENOUS CONTINUOUS
Status: DISCONTINUED | OUTPATIENT
Start: 2021-01-18 | End: 2021-01-19

## 2021-01-18 RX ORDER — DIPHENHYDRAMINE HCL 25 MG
25 TABLET ORAL EVERY 4 HOURS PRN
Status: DISCONTINUED | OUTPATIENT
Start: 2021-01-18 | End: 2021-01-19

## 2021-01-18 RX ORDER — SODIUM CHLORIDE 0.9 % (FLUSH) 0.9 %
10 SYRINGE (ML) INJECTION PRN
Status: DISCONTINUED | OUTPATIENT
Start: 2021-01-18 | End: 2021-01-19

## 2021-01-18 RX ORDER — ONDANSETRON 2 MG/ML
4 INJECTION INTRAMUSCULAR; INTRAVENOUS EVERY 6 HOURS PRN
Status: DISCONTINUED | OUTPATIENT
Start: 2021-01-18 | End: 2021-01-19

## 2021-01-18 RX ADMIN — Medication 25 MCG: at 20:15

## 2021-01-18 RX ADMIN — SODIUM CHLORIDE, POTASSIUM CHLORIDE, SODIUM LACTATE AND CALCIUM CHLORIDE: 600; 310; 30; 20 INJECTION, SOLUTION INTRAVENOUS at 19:31

## 2021-01-18 ASSESSMENT — PATIENT HEALTH QUESTIONNAIRE - PHQ9
SUM OF ALL RESPONSES TO PHQ QUESTIONS 1-9: 0
SUM OF ALL RESPONSES TO PHQ QUESTIONS 1-9: 0
2. FEELING DOWN, DEPRESSED OR HOPELESS: 0
1. LITTLE INTEREST OR PLEASURE IN DOING THINGS: 0

## 2021-01-18 NOTE — H&P
OBSTETRICAL HISTORY [facility]    Date: 2021       Time: 7:22 PM   Patient Name: Calin Latham     Patient : 1995  Room/Bed: 4735/4284-97    Admission Date/Time: 2021  5:05 PM      CC: IOL 2/2 gestation hypertension, newly diagnosed     HPI: Calin Latham is a 22 y.o.  at 40w0d who presents for IOL 2/2 gestational hypertension, newly diagnosed. Patient denies any fever, chills, N/V, headaches, vision changes, chest pain, shortness of breath, RUQ pain, abdominal pain, and increased swelling/tenderness in bilateral lower extremities. Patient denies any vaginal discharge and any urinary complaints. The patient reports fetal movement is present, denies contractions, denies loss of fluid, denies vaginal bleeding. Pregnancy is complicated by gHTN, tobacco use, fetal tachycardia, increased post test T21, BMI 36    DATING:  LMP: Patient's last menstrual period was 2020.   Estimated Date of Delivery: 21   Based on: early ultrasound, at 7 2/7 weeks GA    PREGNANCY RISK FACTORS:  Patient Active Problem List   Diagnosis    Tobacco smoking complicating pregnancy in third trimester    Cigarette smoker    Prenatal care, subsequent pregnancy, third trimester    Weeks of gestation of pregnancy not specified    Fetal tachycardia before the onset of labor    40 weeks gestation of pregnancy        Steroids Given In This Pregnancy:  no     REVIEW OF SYSTEMS:   Constitutional: negative fever, negative chills  HEENT: negative visual disturbances, negative headaches  Respiratory: negative dyspnea, negative cough  Cardiovascular: negative chest pain,  negative palpitations  Gastrointestinal: negative abdominal pain, negative RUQ pain, negative N/V, negative diarrhea, negative constipation  Genitourinary: negative dysuria, negative vaginal discharge, negative vaginal bleeding  Dermatological: negative rash  Hematologic: negative bruising  Immunologic/Lymphatic: negative recent illness, negative recent sick contact  Musculoskeletal: negative back pain, negative myalgias, negative arthralgias  Neurological:  negative dizziness, negative weakness  Behavior/Psych: negative depression, negative anxiety    OBSTETRICAL HISTORY:   OB History    Para Term  AB Living   3 2 2 0 0 2   SAB TAB Ectopic Molar Multiple Live Births   0 0 0 0 0 2      # Outcome Date GA Lbr [place]/2nd Weight Sex Delivery Anes PTL Lv   3 Current            2 Term 16 40w0d  6 lb 14 oz (3.118 kg) M Vag-Spont   JUANA   1 Term 14 40w0d  7 lb (3.175 kg) F Vag-Spont EPI N JUANA       PAST MEDICAL HISTORY:   has no past medical history on file. PAST SURGICAL HISTORY:   has a past surgical history that includes Tonsillectomy and Cystocopy. ALLERGIES:  has No Known Allergies. MEDICATIONS:  Prior to Admission medications    Medication Sig Start Date End Date Taking? Authorizing Provider   famotidine (PEPCID) 20 MG tablet Take 1 tablet by mouth 2 times daily 10/27/20  Yes MACK Parker CNP   Prenatal Vit-Fe Fumarate-FA (PNV PRENATAL PLUS MULTIVITAMIN) 27-1 MG TABS Take 1 tablet by mouth daily 20 Yes MACK Parker CNP       FAMILY HISTORY:  family history includes Asthma in her brother; Heart Disease in her father. SOCIAL HISTORY:   reports that she has been smoking cigarettes. She has been smoking about 0.50 packs per day. She has never used smokeless tobacco. She reports that she does not drink alcohol or use drugs.     VITALS:  Vitals:    21 1837 21 1842   BP: (!) 140/85    Pulse: 110    Resp: 20    Temp: 98.6 °F (37 °C)    TempSrc: Oral    Weight:  200 lb (90.7 kg)   Height:  5' 2\" (1.575 m)         PHYSICAL EXAM:  Fetal Heart Monitor:  Baseline Heart Rate 145, moderate variability, present accelerations, absent decelerations  [place]: irregular, every 2-6 minutes contractions    General appearance:  no apparent distress, alert, and cooperative  HEENT: head atraumatic, normocephalic, moist mucous membranes, trachea midline  Neurologic:  alert, oriented, normal speech, no focal findings or movement disorder noted  Lungs:  No increased work of breathing, good air exchange, clear to auscultation bilaterally, no crackles or wheezing  Heart:  regular rate and rhythm and no murmur    Abdomen:  soft, gravid, non-tender, no rebound, guarding, or rigidity, and no RUQ or epigastric tenderness  Extremities:  no calf tenderness, non edematous, DTR's: +2/4 bilateral lower extremities   Musculoskeletal: Gross strength equal and intact throughout, no gross abnormalities, range of motion normal in hips, knees, shoulders and spine, CVA tenderness: none  Psychiatric: Mood appropriate, normal affect   Rectal Exam: not indicated  Sterile Vaginal Exam:  Cervix: No cervical motion tenderness   Uterus: Is gravid, Normal size, shape, consistency and non-tender   Adnexa: Non-tender, no palpable masses  Cervix: 2 cm dilated, 60 % effaced, -2 station, mid position (out of 3 station), soft consistency, FETAL POSITION: Cephalic (confirmed by ultrasound), Membranes intact,    Bishops Score: 6     0 1 2 3   Position Posterior Intermediate Anterior -   Consistency Firm Intermediate Soft -   Effacement 0-30% 31-50% 51-80% >80%   Dilation 0cm 1-2cm 3-4cm >5cm   Fetal Station -3 -2 -1, 0 +1, +2       LIMITED BEDSIDE US:  Position: Cephalic  Placental Location: anterior  Fetal Heart Tones: Present  Fetal Movement: Present  Amniotic Fluid Index/Volume: >2x2 cm MVP  Estimated Fetal Weight:  7 lbs 3oz    PRENATAL LAB RESULTS:   Blood Type/Rh: A pos  Antibody Screen: negative  Hemoglobin, Hematocrit, Platelets: 35.8 / 38.4 / 349  Rubella: immune  T.  Pallidum, IgG: non-reactive   Hepatitis B Surface Antigen: non-reactive   HIV: non-reactive   Sickle Cell Screen: negative  Gonorrhea: negative  Chlamydia: negative  Urine culture: not done     Early 1 hour Glucose Tolerance Test: 99  1 hour Glucose Tolerance Test: 151  3 hour Glucose Tolerance Test: Fastin; 1 hour: 137; 2 hour  149; 3 hour: 133    Group B Strep: negative  Cystic Fibrosis Screen: negative  First Trimester Screen: not done  MSAFP/Multiple Markers: Increased post test T21 1:263; remainder negative  Non-Invasive Prenatal Testing: not available  Anatomy US: normal anatomy, 3vc, normal cord insertion, anterior placenta    ASSESSMENT & PLAN:  Rachell Thompson is a 22 y.o. female  at 37w0d IOL 2/2 gHTN   - GBS negative / Rh positive / R immune   - No indication for GBS prophylaxis   - Admit to labor and delivery under the service of Dr. Jasmine Richardson   - Blood pressures elevated, no severe ranges. PreE labs ordered. Will monitor closely. - cEFM and TOCO   - Cat 1 FHT and TOCO showing irregular contractions   - CBC, T&S, T.Pal, COVID ordered   - UDS ordered. R/B/A discussed with patient and patient agreeable   - IVF: LR @ 125mL/hr   - Plan of Induction: Cytotec 25mcg PV x1   - Continue expectant management       gHTN    - Blood pressures elevated upon presentation to L&D. No severe ranges. - Denies s/s PreE   - PreE labs ordered   - Will continue to monitor closely       Fetal tachycardia    - Noted in office on NST   - cEFM/TOCO - 145bpm baseline, Cat 1 FHT      Increased T21 post test   - Pretest 1:1030, Post test 1:263   - No NIPT      Tobacco use    - Encouraged cessation      BMI 36    Patient Active Problem List    Diagnosis Date Noted    Prenatal care, subsequent pregnancy, third trimester 2016     Priority: Medium    Tobacco smoking complicating pregnancy in third trimester 2016     Priority: Medium    40 weeks gestation of pregnancy 2021    Fetal tachycardia before the onset of labor 2020    Weeks of gestation of pregnancy not specified 2020    Cigarette smoker 2016       Plan discussed with Dr. Jasmine Richardson, who is agreeable.      Steroids given this admission: No    Risks, benefits, alternatives and possible complications have been discussed in detail with the patient. Admission, and post admission procedures and expectations were discussed in detail. All questions were answered.     Attending's Name: Dr. Anthony Connelly DO  Ob/Gyn Resident  1/18/2021, 7:22 PM

## 2021-01-18 NOTE — PROGRESS NOTES
Marleni Wagoner is a 22 y.o. female 40w0d        OB History    Para Term  AB Living   3 2 2 0 0 2   SAB TAB Ectopic Molar Multiple Live Births   0 0 0   0 2      # Outcome Date GA Lbr [place]/2nd Weight Sex Delivery Anes PTL Lv   3 Current            2 Term 16 40w0d  6 lb 14 oz (3.118 kg) M Vag-Spont   JUANA   1 Term 14 40w0d  7 lb (3.175 kg) F Vag-Spont EPI N JUANA       Chief Complaint   Patient presents with    Routine Prenatal Visit     Patient is 40 weeks gestation and is here for supervision of high risk pregnancy.  High Risk Pregnancy    Non-stress Test        Blood pressure (!) 139/93, pulse 116, temperature 97.2 °F (36.2 °C), temperature source Temporal, weight 200 lb (90.7 kg), last menstrual period 2020, unknown if currently breastfeeding. The patient was seen and evaluated. There was positive fetal movements. No contractions or leakage of fluid. Signs and symptoms of labor were reviewed. The S/S of Pre-Eclampsia were reviewed with the patient in detail. She is to report any of these if they occur. She currently denies any of these. The patient was instructed on fetal kick counts and was given a kick sheet to complete every 8 hours. She was instructed that the baby should move at a minimum of ten times within one hour after a meal. The patient was instructed to lay down on her left side twenty minutes after eating and count movements for up to one hour with a target value of ten movements. She was instructed to notify the office if she did not make that target after two attempts or if after any attempt there was less than four movements. The patient reports that the targets have been made Yes. Allergies: Allergies as of 2021    (No Known Allergies)       Group Beta Strep collection was completed.  Yes    The patient was found to be GBS: negative    Cervical Exam was:   2, 60%, -2 I agree to the above documentation placed by my scribe Yanely Gr. I reviewed the scribe's note and agree with the documented findings and plan of care. Any areas of disagreement are noted on the chart. I have personally evaluated this patient. Additional findings are as noted. I agree with the chief complaint, past medical history, past surgical history, allergies, medications, social and family history as documented unless otherwise noted below.      Electronically signed by Emery Truong MD on 1/19/2021 at 12:37 AM

## 2021-01-18 NOTE — DISCHARGE SUMMARY
Obstetric Discharge Summary  [facility]    Patient Name: Adriana Abreu  Patient : 1995  Primary Care Physician: Ankit Floyd DO  Admit Date: 2021    Principal Diagnosis: IUP at 40w0d, admitted for IOL 2/2 gHTN    Her pregnancy has been complicated by:   Patient Active Problem List   Diagnosis    Tobacco smoking complicating pregnancy in third trimester    Cigarette smoker    Prenatal care, subsequent pregnancy, third trimester    Weeks of gestation of pregnancy not specified    Fetal tachycardia before the onset of labor    40 weeks gestation of pregnancy     21 F Apg 8/9 Wt 6#12       Infection Present?: No  Hospital Acquired: No    Surgical Operations & Procedures:  [] Pitocin Induction of Labor  [] Pitocin Augmentation of Labor  [x] Prostaglandin Induction of Labor  [] Mechanical Induction of Labor  [x] Artificial Rupture of Membranes  [] Intrauterine Pressure Catheter  [] Fetal Scalp Electrode  [] Amnioinfusion  Analgesia: none  Delivery Type: Spontaneous Vaginal Delivery: See Labor and Delivery Summary   Laceration(s): First degree perineal laceration repaired with 3-0 Vicryl. Left lester clitoral laceration repaired with dermabond. Consultations: Anesthesia    Pertinent Findings & Procedures:   Adriana Abreu is a 22 y.o. female  at 40w0d admitted for IOL 2/2 gHTN (new dx); received Cytotec 25mcg PV x1, and AROM (light meconium). PreE labs on admission were wnl with P/C of 0.19. She delivered by spontaneous vaginal a Live Born infant on 21. Information for the patient's :  Eri Larios [[id number]]   female   Birth Weight: 6 lb 12.1 oz (3.065 kg)       Apgars: 8 at 1 minute and 9 at 5 minutes. Postpartum course: normal.  Hgb on PPD#0 11.9.      Course of patient: uncomplicated    Discharge to: Home    Readmission planned: no     Recommendations on Discharge:     Medications:      Medication List START taking these medications    docusate sodium 100 MG capsule  Commonly known as: Colace  Take 1 capsule by mouth 2 times daily as needed for Constipation     ibuprofen 600 MG tablet  Commonly known as: ADVIL;MOTRIN  Take 1 tablet by mouth every 6 hours as needed for Pain        CONTINUE taking these medications    famotidine 20 MG tablet  Commonly known as: PEPCID  Take 1 tablet by mouth 2 times daily     PNV Prenatal Plus Multivitamin 27-1 MG Tabs  Take 1 tablet by mouth daily           Where to Get Your Medications      You can get these medications from any pharmacy    Bring a paper prescription for each of these medications  · docusate sodium 100 MG capsule  · ibuprofen 600 MG tablet           Activity: pelvic rest x 6 weeks, no lifting greater than 15 lbs  Diet: regular diet  Follow up: 1 week, BP check    Condition on discharge: stable    Discharge date: 1/20/2021    Ankit De Los Santos DO  Ob/Gyn Resident    Comments:  Home care and follow-up care were reviewed. Pelvic rest, and birth control were reviewed. Signs and symptoms of mastitis and post partum depression were reviewed. The patient is to notify her physician if any of these occur. The patient was counseled on secondary smoke risks and the increased risk of sudden infant death syndrome and respiratory problems to her baby with exposure. She was counseled on various alternate recommendations to decrease the exposure to secondary smoke to her children.

## 2021-01-19 LAB
ABSOLUTE EOS #: 0.07 K/UL (ref 0–0.44)
ABSOLUTE IMMATURE GRANULOCYTE: 0.11 K/UL (ref 0–0.3)
ABSOLUTE LYMPH #: 3.59 K/UL (ref 1.1–3.7)
ABSOLUTE MONO #: 1.18 K/UL (ref 0.1–1.2)
ACCELERATIONS > 10BPM: NORMAL
ACCELERATIONS > 15 BPM: 2
ACOUSTIC STIMULATION: NO
BASOPHILS # BLD: 0 % (ref 0–2)
BASOPHILS ABSOLUTE: 0.05 K/UL (ref 0–0.2)
DECELERATIONS: NORMAL
DIFFERENTIAL TYPE: ABNORMAL
EOSINOPHILS RELATIVE PERCENT: 0 % (ref 1–4)
FHR VARIABILITIES: NORMAL
HCT VFR BLD CALC: 37.5 % (ref 36.3–47.1)
HEMOGLOBIN: 11.9 G/DL (ref 11.9–15.1)
IMMATURE GRANULOCYTES: 1 %
LYMPHOCYTES # BLD: 19 % (ref 24–43)
MCH RBC QN AUTO: 28.7 PG (ref 25.2–33.5)
MCHC RBC AUTO-ENTMCNC: 31.7 G/DL (ref 28.4–34.8)
MCV RBC AUTO: 90.6 FL (ref 82.6–102.9)
MONOCYTES # BLD: 6 % (ref 3–12)
NRBC AUTOMATED: 0 PER 100 WBC
NST ASSESSMENT: REACTIVE
NST BASELINE: 150
NST DURATION: 20 MINUTES
NST INDICATIONS: NORMAL
NST LOCATIONS: NORMAL
NST READ BY: NORMAL
NST RETURN: NORMAL
PDW BLD-RTO: 13.5 % (ref 11.8–14.4)
PLATELET # BLD: 629 K/UL (ref 138–453)
PLATELET ESTIMATE: ABNORMAL
PMV BLD AUTO: 9.6 FL (ref 8.1–13.5)
RBC # BLD: 4.14 M/UL (ref 3.95–5.11)
RBC # BLD: ABNORMAL 10*6/UL
SEG NEUTROPHILS: 74 % (ref 36–65)
SEGMENTED NEUTROPHILS ABSOLUTE COUNT: 14.39 K/UL (ref 1.5–8.1)
UTERINE ACTIVITY: NO
WBC # BLD: 19.4 K/UL (ref 3.5–11.3)
WBC # BLD: ABNORMAL 10*3/UL

## 2021-01-19 PROCEDURE — 0UQJXZZ REPAIR CLITORIS, EXTERNAL APPROACH: ICD-10-PCS | Performed by: SPECIALIST

## 2021-01-19 PROCEDURE — 0HQ9XZZ REPAIR PERINEUM SKIN, EXTERNAL APPROACH: ICD-10-PCS | Performed by: SPECIALIST

## 2021-01-19 PROCEDURE — 7200000001 HC VAGINAL DELIVERY

## 2021-01-19 PROCEDURE — 6370000000 HC RX 637 (ALT 250 FOR IP): Performed by: STUDENT IN AN ORGANIZED HEALTH CARE EDUCATION/TRAINING PROGRAM

## 2021-01-19 PROCEDURE — 85025 COMPLETE CBC W/AUTO DIFF WBC: CPT

## 2021-01-19 PROCEDURE — 36415 COLL VENOUS BLD VENIPUNCTURE: CPT

## 2021-01-19 PROCEDURE — 6360000002 HC RX W HCPCS: Performed by: SPECIALIST

## 2021-01-19 PROCEDURE — 6360000002 HC RX W HCPCS

## 2021-01-19 PROCEDURE — 59409 OBSTETRICAL CARE: CPT | Performed by: SPECIALIST

## 2021-01-19 PROCEDURE — 96372 THER/PROPH/DIAG INJ SC/IM: CPT

## 2021-01-19 PROCEDURE — 88307 TISSUE EXAM BY PATHOLOGIST: CPT

## 2021-01-19 PROCEDURE — 1220000000 HC SEMI PRIVATE OB R&B

## 2021-01-19 PROCEDURE — 96374 THER/PROPH/DIAG INJ IV PUSH: CPT

## 2021-01-19 PROCEDURE — 10907ZC DRAINAGE OF AMNIOTIC FLUID, THERAPEUTIC FROM PRODUCTS OF CONCEPTION, VIA NATURAL OR ARTIFICIAL OPENING: ICD-10-PCS | Performed by: SPECIALIST

## 2021-01-19 RX ORDER — DOCUSATE SODIUM 100 MG/1
100 CAPSULE, LIQUID FILLED ORAL 2 TIMES DAILY PRN
Qty: 60 CAPSULE | Refills: 1 | Status: SHIPPED | OUTPATIENT
Start: 2021-01-19 | End: 2021-04-12

## 2021-01-19 RX ORDER — MORPHINE SULFATE 10 MG/ML
5 INJECTION, SOLUTION INTRAMUSCULAR; INTRAVENOUS ONCE
Status: COMPLETED | OUTPATIENT
Start: 2021-01-19 | End: 2021-01-19

## 2021-01-19 RX ORDER — IBUPROFEN 600 MG/1
600 TABLET ORAL EVERY 6 HOURS PRN
Qty: 120 TABLET | Refills: 0 | Status: SHIPPED | OUTPATIENT
Start: 2021-01-19 | End: 2021-04-12

## 2021-01-19 RX ORDER — IBUPROFEN 800 MG/1
800 TABLET ORAL EVERY 6 HOURS PRN
Status: DISCONTINUED | OUTPATIENT
Start: 2021-01-19 | End: 2021-01-20 | Stop reason: HOSPADM

## 2021-01-19 RX ORDER — HYDROCORTISONE 25 MG/G
CREAM TOPICAL
Status: DISCONTINUED | OUTPATIENT
Start: 2021-01-19 | End: 2021-01-20 | Stop reason: HOSPADM

## 2021-01-19 RX ORDER — SODIUM CHLORIDE, SODIUM LACTATE, POTASSIUM CHLORIDE, CALCIUM CHLORIDE 600; 310; 30; 20 MG/100ML; MG/100ML; MG/100ML; MG/100ML
INJECTION, SOLUTION INTRAVENOUS CONTINUOUS
Status: DISCONTINUED | OUTPATIENT
Start: 2021-01-19 | End: 2021-01-20 | Stop reason: HOSPADM

## 2021-01-19 RX ORDER — SIMETHICONE 80 MG
80 TABLET,CHEWABLE ORAL EVERY 6 HOURS PRN
Status: DISCONTINUED | OUTPATIENT
Start: 2021-01-19 | End: 2021-01-20 | Stop reason: HOSPADM

## 2021-01-19 RX ORDER — ACETAMINOPHEN 500 MG
1000 TABLET ORAL EVERY 6 HOURS PRN
Status: DISCONTINUED | OUTPATIENT
Start: 2021-01-19 | End: 2021-01-20 | Stop reason: HOSPADM

## 2021-01-19 RX ORDER — ONDANSETRON 2 MG/ML
4 INJECTION INTRAMUSCULAR; INTRAVENOUS EVERY 4 HOURS PRN
Status: DISCONTINUED | OUTPATIENT
Start: 2021-01-19 | End: 2021-01-20 | Stop reason: HOSPADM

## 2021-01-19 RX ORDER — DOCUSATE SODIUM 100 MG/1
100 CAPSULE, LIQUID FILLED ORAL 2 TIMES DAILY
Status: DISCONTINUED | OUTPATIENT
Start: 2021-01-19 | End: 2021-01-20 | Stop reason: HOSPADM

## 2021-01-19 RX ORDER — MORPHINE SULFATE 10 MG/ML
INJECTION, SOLUTION INTRAMUSCULAR; INTRAVENOUS
Status: DISCONTINUED
Start: 2021-01-19 | End: 2021-01-19

## 2021-01-19 RX ORDER — MORPHINE SULFATE 2 MG/ML
INJECTION, SOLUTION INTRAMUSCULAR; INTRAVENOUS
Status: DISCONTINUED
Start: 2021-01-19 | End: 2021-01-19 | Stop reason: WASHOUT

## 2021-01-19 RX ORDER — BISACODYL 10 MG
10 SUPPOSITORY, RECTAL RECTAL DAILY PRN
Status: DISCONTINUED | OUTPATIENT
Start: 2021-01-19 | End: 2021-01-20 | Stop reason: HOSPADM

## 2021-01-19 RX ORDER — LANOLIN 72 %
OINTMENT (GRAM) TOPICAL PRN
Status: DISCONTINUED | OUTPATIENT
Start: 2021-01-19 | End: 2021-01-20 | Stop reason: HOSPADM

## 2021-01-19 RX ORDER — LIDOCAINE HYDROCHLORIDE 10 MG/ML
INJECTION, SOLUTION INFILTRATION; PERINEURAL
Status: DISCONTINUED
Start: 2021-01-19 | End: 2021-01-19

## 2021-01-19 RX ORDER — PROMETHAZINE HYDROCHLORIDE 25 MG/ML
12.5 INJECTION, SOLUTION INTRAMUSCULAR; INTRAVENOUS ONCE
Status: COMPLETED | OUTPATIENT
Start: 2021-01-19 | End: 2021-01-19

## 2021-01-19 RX ADMIN — ACETAMINOPHEN 1000 MG: 500 TABLET ORAL at 07:57

## 2021-01-19 RX ADMIN — Medication 909 MILLI-UNITS/MIN: at 03:33

## 2021-01-19 RX ADMIN — MORPHINE SULFATE 5 MG: 10 INJECTION INTRAVENOUS at 03:32

## 2021-01-19 RX ADMIN — PROMETHAZINE HYDROCHLORIDE 12.5 MG: 25 INJECTION INTRAMUSCULAR; INTRAVENOUS at 03:33

## 2021-01-19 RX ADMIN — ACETAMINOPHEN 1000 MG: 500 TABLET ORAL at 14:35

## 2021-01-19 RX ADMIN — IBUPROFEN 800 MG: 800 TABLET, FILM COATED ORAL at 07:57

## 2021-01-19 RX ADMIN — BENZOCAINE AND LEVOMENTHOL: 200; 5 SPRAY TOPICAL at 07:58

## 2021-01-19 RX ADMIN — DOCUSATE SODIUM 100 MG: 100 CAPSULE, LIQUID FILLED ORAL at 07:57

## 2021-01-19 RX ADMIN — IBUPROFEN 800 MG: 800 TABLET, FILM COATED ORAL at 16:46

## 2021-01-19 RX ADMIN — DOCUSATE SODIUM 100 MG: 100 CAPSULE, LIQUID FILLED ORAL at 20:41

## 2021-01-19 RX ADMIN — ACETAMINOPHEN 1000 MG: 500 TABLET ORAL at 22:49

## 2021-01-19 ASSESSMENT — PAIN - FUNCTIONAL ASSESSMENT
PAIN_FUNCTIONAL_ASSESSMENT: ACTIVITIES ARE NOT PREVENTED

## 2021-01-19 ASSESSMENT — PAIN DESCRIPTION - PROGRESSION
CLINICAL_PROGRESSION: NOT CHANGED
CLINICAL_PROGRESSION: GRADUALLY IMPROVING
CLINICAL_PROGRESSION: NOT CHANGED

## 2021-01-19 ASSESSMENT — PAIN SCALES - GENERAL
PAINLEVEL_OUTOF10: 10
PAINLEVEL_OUTOF10: 2
PAINLEVEL_OUTOF10: 2
PAINLEVEL_OUTOF10: 1
PAINLEVEL_OUTOF10: 3
PAINLEVEL_OUTOF10: 2

## 2021-01-19 ASSESSMENT — PAIN DESCRIPTION - DESCRIPTORS
DESCRIPTORS: ACHING;BURNING

## 2021-01-19 ASSESSMENT — PAIN DESCRIPTION - PAIN TYPE
TYPE: ACUTE PAIN

## 2021-01-19 ASSESSMENT — PAIN DESCRIPTION - LOCATION
LOCATION: PERINEUM;ABDOMEN
LOCATION: PERINEUM

## 2021-01-19 NOTE — CARE COORDINATION
POST-PARTUMINITIAL DISCHARGE PLANNING/CARE COORDINATION    40 weeks gestation of pregnancy [Z3A.40]    HPI: Writer met w/ patient at bedside to discuss DCP. Infant name on BC: Kvng Paige or Lexie. Infant to [facility]. Infant PCP Dr Neeta St. FOB: Kelly Renee verified name/address/phone number correct on facesheet    [employer] insurance correct. Writer notified patient she has 30 days from date of birth to add [place] to insurance policy. Mother verbalized understanding and will call S/Anderson. Mom verbalized has/have all necessary items for Karina. No previous home care or dme. No Home Care or DME anticipated. Anticipate DC of couplet 2021 after  2021. CM continue to follow for any DC needs.     OB: Dr Jackelyn Carpio

## 2021-01-19 NOTE — PROGRESS NOTES
POST PARTUM DAY # 1    Otis Hayden is a 22 y.o. female  This patient was seen & examined today.  21    Her pregnancy was complicated by:   Patient Active Problem List   Diagnosis    Tobacco smoking complicating pregnancy in third trimester    Cigarette smoker    Prenatal care, subsequent pregnancy, third trimester    Weeks of gestation of pregnancy not specified    Fetal tachycardia before the onset of labor    40 weeks gestation of pregnancy     21 F Apg 8/9 Wt 6#12       Today she is doing well without any chief complaint. Her lochia is light. She denies chest pain, shortness of breath, headache, lightheadedness and blurred vision. She is bottle and breast feeding and she denies any breast tenderness. She is ambulating well. Her voiding pattern is normal. I reviewed signs and symptoms of post partum depression with the patient, she currently denies any of these symptoms. She is tolerating solids. Vital Signs:  Vitals:    21 1200 21 1615 21 1627 215   BP: 124/77 124/78  138/89   Pulse: 92 96 96 89   Resp: 18 16  16   Temp: 98.2 °F (36.8 °C) 97.9 °F (36.6 °C)  97.6 °F (36.4 °C)   TempSrc: Oral Oral  Oral   Weight:       Height:             Physical Exam:  General:  no apparent distress, alert and cooperative  Neurologic:  alert, oriented, normal speech, no focal findings or movement disorder noted  Lungs:  No increased work of breathing, good air exchange, clear to auscultation bilaterally, no crackles or wheezing  Heart:  Normal apical impulse, regular rate and rhythm, normal S1 and S2, no S3 or S4, and no murmur noted    Abdomen: abdomen soft, non-distended, non-tender  Fundus: non-tender, firm, below umbilicus  Extremities:  no calf tenderness, non edematous    Lab:  Lab Results   Component Value Date    HGB 11.9 2021     Lab Results   Component Value Date    HCT 37.5 2021       Assessment/Plan:  1.  Otis Hayden is a [id number] PPD # 1 s/p    - Doing well, VSS   - Female infant in [address]   - Encourage ambulation   2. Rh positive/Rubella immune  3. Breast and Bottle feeding    - Denies s/s of mastitis  4. Gestational HTN (newly diagnosed)   - Normotensive overnight    - Denies s/s of preE   - PreE labs wnl x1, P/C 0.19   5. Tobacco Use   - Cessation enocuraged  6. Continue post partum care    Counseling Completed:  Secondary Smoke risks and Sudden Infant Death Syndrome were reviewed with recommendations. Infant sleeping, \"back to sleep\" and avoidance of co-sleeping recommendations were reviewed. Signs and Symptoms of Post Partum Depression were reviewed. The patient is to call if any occur. Signs and symptoms of Mastitis were reviewed. The patient is to call if any occur for follow up. Discharge instructions including pelvic rest, no driving with pain medicine and office follow-up were reviewed with patient     Attending Physician: Dr. Mahamed Mena,   Ob/Gyn Resident   2021, 12:27 AM          Attending Physician Statement  I have discussed the care of Narayan Pappas, including pertinent history and exam findings,  with the resident. I have seen and examined the patient and the key elements of all parts of the encounter have been performed by me. I agree with the assessment, plan and orders as documented by the resident. (GC Modifier)    PT seen and examined. She denies any CP/SOB/F/CH/N/V. She states pain is minimal, her bleeding is light. She if bottle/breast feeding. Vitals:    21 1627 21 2035 21 0417 21 0900   BP:  138/89 126/81 117/84   Pulse: 96 89 92 100   Resp:  16 16 16   Temp:  97.6 °F (36.4 °C) 97.8 °F (36.6 °C) 97.9 °F (36.6 °C)   TempSrc:  Oral Oral Oral   Weight:       Height:         No results found for this or any previous visit (from the past 24 hour(s)).      PPD#1 , female  Rh+  GHTN, vss, no s/s of preeclampsia  Suggest smoking cessation    All questions answered. Discharge Instructions for Labor and Delivery, Vaginal Birth     A vaginal birth refers to the baby being delivered through the vagina. The amount of time that labor can take varies greatly. Labor for the average first-born baby is about 12 hours. Usually your hospital stay after a routine delivery is no more than two nights. Some new mothers go home the same day. Recovery from childbirth varies depending upon whether you had an episiotomy (an incision in the perineum, the area between your vaginal opening and your anus), the duration of labor and delivery, and the amount of rest you get. In general, it takes about 6-8 weeks for a woman's body to recover from childbirth. What You Will Need   Along with your medications, you will need the following:   · Sanitary pads    · Nursing pads    · Witch hazel pads    · Sitz bath    Steps to [address] will want to arrange for transportation home for you and your baby. The baby will need a car seat. You will receive instructions in the hospital for breastfeeding and taking care of the perineum area. You may use ointment for cracked nipples or warm water rinses to your perineum. · You will need to wear sanitary pads for about six weeks after delivery. · If you had an episiotomy or vaginal tear, you will be sent home with a plastic squirt bottle. Fill it with warm water and squirt over the vaginal and anal area every time you urinate and defecate. · Warm baths can be soothing to healing tissues. · Apply warm or cold cloths to sore breasts. · Apply ointment to cracked nipples. · Use nursing pads for leaky breast.    · Apply witch hazel pads to sore perineum (area between vagina and anus). · Ask your doctor about when it is safe for you to shower or bathe. · Sit in a sitz bath to soothe sore perineum and/or hemorrhoids. A sitz bath is soaking the hip and buttocks area in warm water.  You can buy a plastic sitz bath at most drugstores. It will fit on your toilet. You can also use your bathtub. Diet    Eat a well balanced, healthy diet to help your recover from childbirth. If you are breastfeeding, you will need additional calories each day. You may also be advised to avoid certain foods. Some women experience constipation after childbirth. To avoid this problem:   · Drink plenty of fluids. · Eat foods high in fiber, such as:   ¨ Whole grain cereals and breads   ¨ Fruits and vegetables   ¨ Legumes (eg, beans, lentils)   Physical Activity    Labor and delivery is tiring. Rest when you can to regain your energy. Your doctor will encourage you to exercise by walking. Ask your doctor when you will be able to return to more strenuous exercise. Your doctor may suggest you do Kegel exercises to strengthen your pelvic muscles. To do these tighten your muscles as if you were stopping your urine flow. Hold for a few seconds and then relax. Do these throughout the day. Medications    Breastfeeding can cause your uterus to contract. If painful, your doctor may recommend a pain reliever. If you are breastfeeding, it's important to ask your doctor about taking medications. You may receive from the hospital a list of medications to avoid. Once your doctor has approved your medications, it's important to:   · Take your medication as directed. Do not change the amount or the schedule. · Do not stop taking them without talking to your doctor. · Do not share them. · Know what the results and side effects may be. Report bothersome side effects to your doctor. · Some drugs can be dangerous when mixed. Talk to a doctor or pharmacist if you are taking more than one drug. This includes over-the-counter medication and herb or dietary supplements. · Plan ahead for refills so you don't run out. Lifestyle Changes    Having a baby requires significant lifestyle changes.  You and your doctor will plan lifestyle changes that will help you recover. Some things to keep in mind include:   · It is important to get enough rest so you can recover. Try sleeping when the baby sleeps. · Ask your doctor when you can resume sexual relations. If you have not done so already, talk to your doctor about birth control options. · If you are breastfeeding, consider a breast pump. · Call your obstetrician and/or pediatrician for any questions that arise. · Understand the changes your body is going through as it recovers from childbirth:   ¨ Hot and cold flashes as your body adjusts to new hormone and blood flow levels   ¨ Urinary or fecal incontinence due to stretched muscles   ¨ After pains from uterine contractions as the uterus shrinks   ¨ Vaginal bleeding (called lochia), which is heavier than your period (generally stops within two months)   ¨ Baby blues, feelings of irritability, sadness, crying, or anxiety. Postpartum depression is more severe, occurring in 10%-20% of new moms. If you experience such symptoms, contact your doctor. · Consider joining a support group for new mothers. You can get encouragement and learn parenting strategies. Follow-up   Schedule a follow-up appointment as directed by your doctor. Call Your Doctor If Any of the Following Occurs   It is important for you to monitor your recovery once you leave the hospital. That way, you can alert your doctor to any problems immediately.  If any of the following occurs, call your doctor:   · Signs of infection, including fever and chills    · Increased bleeding: soaking more than one sanitary pad an hour    · Wounds that become red, swollen or drain pus    · Vaginal discharge that smells foul    · New pain, swelling, or tenderness in your legs    · Pain that you can't control with the medications you've been given    · Pain, burning, urgency or frequency of urination, or persistent bleeding in the urine    · Cough, shortness of breath, or chest pain    · Depression, suicidal thoughts, or feelings of harming your baby    · Breasts that are hot, red and accompanied by fever    · Any cracking or bleeding from the nipple or areola (the dark-colored area of the breast)      In case of an emergency, call 911 immediately.        Rayo Anne,

## 2021-01-19 NOTE — L&D DELIVERY NOTE
clamped date/time: 2021 0311  Cord blood disposition: Lab  Gases sent?: Yes  Stem cell collection (by provider): No     Placenta    Date/time: 2021 03:18:45  Removal: Manual Removal  Appearance: Intact  Disposition: Lab, Pathology     Delivery Resuscitation    Method: Bulb Suction, Stimulation     Apgars    Living status: Living  Apgars   1 Minute:  5 Minute:  10 Minute 15 Minute 20 Minute   Skin Color: 0  1       Heart Rate: 2  2       Reflex Irritability: 2  2       Muscle Tone: 2  2       Respiratory Effort: 2  2       Total: 8  9               Apgars Assigned Tika Haddad RN     Skin to Skin    Skin to skin initiation date/time: 21 03:11:27   Skin to skin with: Mother  Skin to skin end date/time:        [employer] Measurements       Delivery Information    Surgical or additional est. blood loss (mL): 0 (View Only): Edit in Flowsheets   Combined est. blood loss (mL): 0            Vaginal Delivery Note  Department of Obstetrics and Gynecology  [address] [place] [address]       Patient: Chandler Sow   : 1995  MRN: [id number]   Date of delivery: 21     Pre-operative Diagnosis: Chandler Sow [id number] at 40w1d, IOL 2/2 gHTN (new dx)    Fetal tachycardia     Increased T21 post test (No NIPT)     Tobacco use     BMI 36    Post-operative Diagnosis:  Living  infant and Female    Delivering Obstetrician & Assistant(s): Dr. Chey Bautista; Melva Loza PGY3; Jaspreet Alford DO PGY1; Arash Overton PGY1; Evelyn Donis MS4    Infant Information:   Information for the patient's :  Cherie Hu Girl Kristy Varela [[id number]]        Information for the patient's :  Pilar Arellanos [[id number]]          Apgar scores: 8 at 1 minute and 9 at 5 minutes. Anesthesia:  none    Application and Delivery:    She was known to be GBS negative. The patient progressed well, became complete and felt the urge to push.  After pushing with contractions the fetal head delivered

## 2021-01-19 NOTE — LACTATION NOTE
Attempted to awaken baby for a feeing. Baby too sleepy to fee. Encouraged mom to hold the baby skin to skin.

## 2021-01-19 NOTE — PLAN OF CARE
Problem: Fluid Volume - Imbalance:  Goal: Absence of imbalanced fluid volume signs and symptoms  Description: Absence of imbalanced fluid volume signs and symptoms  1/19/2021 1844 by Nico Sesay RN  Outcome: Ongoing  1/19/2021 0737 by Nico Sesay RN  Outcome: Ongoing  Goal: Absence of postpartum hemorrhage signs and symptoms  Description: Absence of postpartum hemorrhage signs and symptoms  1/19/2021 1844 by Nico Sesay RN  Outcome: Ongoing  1/19/2021 0737 by Nico Sesay RN  Outcome: Ongoing     Problem: Pain - Acute:  Goal: Pain level will decrease  Description: Pain level will decrease  1/19/2021 1844 by Nico Sesay RN  Outcome: Ongoing  1/19/2021 0737 by Nico Sesay RN  Outcome: Ongoing     Problem: Discharge Planning:  Goal: Discharged to appropriate level of care  Description: Discharged to appropriate level of care  1/19/2021 1844 by Nico Sesay RN  Outcome: Ongoing  1/19/2021 0737 by Nico Sesay RN  Outcome: Ongoing     Problem: Constipation:  Goal: Bowel elimination is within specified parameters  Description: Bowel elimination is within specified parameters  1/19/2021 1844 by Nico Sesay RN  Outcome: Ongoing  1/19/2021 0737 by Nico Sesay RN  Outcome: Ongoing     Problem: Infection - Risk of, Puerperal Infection:  Goal: Will show no infection signs and symptoms  Description: Will show no infection signs and symptoms  1/19/2021 1844 by Nico Sesay RN  Outcome: Ongoing  1/19/2021 0737 by Nico Sesay RN  Outcome: Ongoing     Problem: Mood - Altered:  Goal: Mood stable  Description: Mood stable  1/19/2021 1844 by Nico Sesay RN  Outcome: Ongoing  1/19/2021 0737 by Nico Sesay RN  Outcome: Ongoing     Problem: Pain:  Goal: Pain level will decrease  Description: Pain level will decrease  1/19/2021 1844 by Nico Sesay RN  Outcome: Ongoing  1/19/2021 0737 by Nico Sesay RN  Outcome: Ongoing  Goal: Control of acute pain  Description: Control of acute pain  Outcome: Ongoing  Goal: Control of chronic pain  Description: Control of chronic pain  Outcome: Ongoing

## 2021-01-19 NOTE — LACTATION NOTE
Mom reports that the baby isn't feeding for more than a couple of minutes. Baby had spit up mucous earlier. Reassurance given. Packet of breastfeeding information given. Reviewed colostral feeding patterns for the first 48 hours. Encouraged mom to call out for assistance as needed.

## 2021-01-19 NOTE — PROGRESS NOTES
Labor Progress Note    Temi Garcia is a 22 y.o. female  at 37w0d  The patient was seen and examined. Her pain is well controlled. She reports fetal movement is present, complains of contractions, denies loss of fluid, denies vaginal bleeding.        Vital Signs:  Vitals:    21 1837 21 1842 21 2000 21   BP: (!) 140/85  133/72 139/78   Pulse: 110  113 103   Resp:    Temp: 98.6 °F (37 °C)  98.4 °F (36.9 °C)    TempSrc: Oral  Oral    Weight:  200 lb (90.7 kg)     Height:  5' 2\" (1.575 m)           FHT: 145, moderate variability, accelerations present, decelerations absent  Contractions: regular, every 2 minutes    Chaperone for Intimate Exam: Chaperone was present for entire exam, Chaperone Name: Ashlie Nielson RN  Cervical Exam: 3 cm dilated, 60 effaced, -2 station  Pitocin: @ 0 mu/min    Membranes: Intact  Scalp Electrode in place: absent  Intrauterine Pressure Catheter in Place: absent    Interventions: none    Assessment/Plan:  Temi Garcia is a 22 y.o. female  at 37w0d admitted for IOL 2/2 gHTN   - GBS negative, No indication for GBS prophylaxis   - cEFM/TOCO - Cat 1 FHT, contractions q2min   - s/p Cytotec 25mcg PV x1, next when able   - Continue expectant management    gHTN   - Denies s/s PreE   - PreE labs wnl x1, P/C 0.19    - VSS    Attending updated and in agreement with plan    Radha Campuzano DO  Ob/Gyn Resident  2021, 12:05 AM

## 2021-01-19 NOTE — PROGRESS NOTES
Labor Progress Note    Marleni Wagoner is a 22 y.o. female  at 44w3d  The patient was seen and examined. Her pain is well controlled. She reports fetal movement is present, complains of contractions, complains of loss of fluid, denies vaginal bleeding.        Vital Signs:  Vitals:    21 1842 21 2000 21 2058 21 2357   BP:  133/72 139/78 130/75   Pulse:  113 103 102   Resp:  20 18 16   Temp:  98.4 °F (36.9 °C)  98.1 °F (36.7 °C)   TempSrc:  Oral  Oral   Weight: 200 lb (90.7 kg)      Height: 5' 2\" (1.575 m)            FHT: 135, moderate variability, accelerations present, decelerations early  Contractions: regular, every 2 minutes    Chaperone for Intimate Exam: Chaperone was present for entire exam, Chaperone Name: Librado Connell RN  Cervical Exam: 7 cm dilated, 90 effaced, 0 station  Pitocin: @ 0 mu/min    Membranes: Ruptured meconium stained  Scalp Electrode in place: absent  Intrauterine Pressure Catheter in Place: absent    Interventions: none    Assessment/Plan:  Marleni Wagoner is a 22 y.o. female  at 44w3d admitted for IOL 2/2 gHTN              - GBS negative, No indication for GBS prophylaxis              - cEFM/TOCO - Cat 1 FHT, contractions q2min              - AROM (meconium) @ 2881              - s/p Cytotec 25mcg PV x1              - Continue expectant management     gHTN              - Denies s/s PreE              - PreE labs wnl x1, P/C 0.19               - VSS        Attending updated and in agreement with plan    Gabrielle Lovell DO  Ob/Gyn Resident  2021, 2:53 AM

## 2021-01-19 NOTE — CARE COORDINATION
Social Work     Sw reviewed medical record (current active problem list) and tox screens and found no concerns. Sw met with mom and fob (holding baby) briefly to explain Sw role, inquire if any needs or concerns, and provide safe sleep education and discuss. Mom denied any needs or questions and informs baby has a safe sleep environment. Mom reports she also has 2 other children ( 6,4) who are very excited for baby. Parents report a great support system that includes both sides of family. Mom reports that baby will be seen by her family dr who also see's other children ( Dr. Naren More). Mom reports she is linked with WIC and denied any current social questions or needs. Sw encouraged mom to reach out if any issues or concerns arise.

## 2021-01-20 VITALS
HEART RATE: 86 BPM | HEIGHT: 62 IN | DIASTOLIC BLOOD PRESSURE: 74 MMHG | RESPIRATION RATE: 16 BRPM | SYSTOLIC BLOOD PRESSURE: 113 MMHG | TEMPERATURE: 97.9 F | BODY MASS INDEX: 36.8 KG/M2 | WEIGHT: 200 LBS

## 2021-01-20 PROCEDURE — 6370000000 HC RX 637 (ALT 250 FOR IP): Performed by: STUDENT IN AN ORGANIZED HEALTH CARE EDUCATION/TRAINING PROGRAM

## 2021-01-20 RX ORDER — FAMOTIDINE 20 MG/1
20 TABLET, FILM COATED ORAL 2 TIMES DAILY
Status: DISCONTINUED | OUTPATIENT
Start: 2021-01-20 | End: 2021-01-20 | Stop reason: HOSPADM

## 2021-01-20 RX ADMIN — ACETAMINOPHEN 1000 MG: 500 TABLET ORAL at 15:02

## 2021-01-20 RX ADMIN — DOCUSATE SODIUM 100 MG: 100 CAPSULE, LIQUID FILLED ORAL at 08:49

## 2021-01-20 RX ADMIN — FAMOTIDINE 20 MG: 20 TABLET, FILM COATED ORAL at 10:47

## 2021-01-20 RX ADMIN — IBUPROFEN 800 MG: 800 TABLET, FILM COATED ORAL at 08:53

## 2021-01-20 ASSESSMENT — PAIN SCALES - GENERAL: PAINLEVEL_OUTOF10: 0

## 2021-01-20 NOTE — LACTATION NOTE
Discharge information given, offered follow up appointment. Showed mom supporting of baby and breast during  feeding. Mom has described shaft feeding and some soreness. Encouraged to call for help with next feeding. Observed baby has upper lip tie and anterior tongue tie elevated palate.

## 2021-01-21 LAB — SURGICAL PATHOLOGY REPORT: NORMAL

## 2021-04-12 ENCOUNTER — POSTPARTUM VISIT (OUTPATIENT)
Dept: OBGYN CLINIC | Age: 26
End: 2021-04-12
Payer: MEDICARE

## 2021-04-12 VITALS
SYSTOLIC BLOOD PRESSURE: 136 MMHG | BODY MASS INDEX: 34.78 KG/M2 | WEIGHT: 189 LBS | HEART RATE: 94 BPM | DIASTOLIC BLOOD PRESSURE: 84 MMHG | HEIGHT: 62 IN

## 2021-04-12 DIAGNOSIS — N76.0 ACUTE VAGINITIS: ICD-10-CM

## 2021-04-12 RX ORDER — METRONIDAZOLE 500 MG/1
500 TABLET ORAL 2 TIMES DAILY
Qty: 14 TABLET | Refills: 0 | Status: SHIPPED | OUTPATIENT
Start: 2021-04-12 | End: 2021-04-19

## 2021-04-12 RX ORDER — FLUCONAZOLE 100 MG/1
100 TABLET ORAL DAILY
Qty: 7 TABLET | Refills: 0 | Status: SHIPPED | OUTPATIENT
Start: 2021-04-12 | End: 2021-04-19

## 2021-04-12 ASSESSMENT — ENCOUNTER SYMPTOMS
NAUSEA: 0
ABDOMINAL DISTENTION: 0
COUGH: 0
CONSTIPATION: 0
VOMITING: 0
DIARRHEA: 0
ABDOMINAL PAIN: 0
APNEA: 0
EYE PAIN: 0

## 2021-04-12 NOTE — PROGRESS NOTES
Postpartum Visit: Patient is here today for postpartum vaginal delivery. I have fully reviewed the prenatal and intrapartum course. She is 12 weeks postpartum. Patient is breast feeding. Her bleeding is spotting. Patient's pain is 0 out of 10 on the pain scale. Patient is sexually active. Current contraception method is condoms. Postpartum depression screening questionnaire provided to patient and is negative.

## 2021-04-12 NOTE — PROGRESS NOTES
Subjective:      Patient ID: Didi Michael is a 22 y.o. female. Chief Complaint   Patient presents with    Follow-up     Pt delivered on 2021      /84 (Site: Right Upper Arm, Position: Sitting, Cuff Size: Medium Adult)   Pulse 94   Ht 5' 2\" (1.575 m)   Wt 189 lb (85.7 kg)   Breastfeeding Yes   BMI 34.57 kg/m²   No LMP recorded. [id number]    History reviewed. No pertinent past medical history. Current Outpatient Medications Ordered in Epic   Medication Sig Dispense Refill    metroNIDAZOLE (FLAGYL) 500 MG tablet Take 1 tablet by mouth 2 times daily for 7 days 14 tablet 0    fluconazole (DIFLUCAN) 100 MG tablet Take 1 tablet by mouth daily for 7 days 7 tablet 0     No current Epic-ordered facility-administered medications on file. Problem List Items Addressed This Visit     Routine postpartum follow-up - Primary    Acute vaginitis        No Known Allergies  No orders of the defined types were placed in this encounter. Postpartum Visit: Patient is here today for postpartum vaginal delivery. I have fully reviewed the prenatal and intrapartum course. She is 12 weeks postpartum. Patient is breast feeding. Her bleeding is spotting. She states that she has not had a period since her delivery. Patient's pain is 0 out of 10 on the pain scale. Patient is sexually active. Current contraception method is condoms. Postpartum depression screening questionnaire provided to patient and is negative. Review of Systems   Constitutional: Negative for activity change, appetite change and fever. HENT: Negative for ear discharge and ear pain. Eyes: Negative for pain and visual disturbance. Respiratory: Negative for apnea and cough. Cardiovascular: Negative for chest pain, palpitations and leg swelling. Gastrointestinal: Negative for abdominal distention, abdominal pain, constipation, diarrhea, nausea and vomiting. Endocrine: Negative.     Genitourinary: Negative for difficulty Take 1 tablet by mouth daily for 7 days     Dispense:  7 tablet     Refill:  0     Appointment for annual exam.    I, James Botello, am scribing for, and in the presence of Dr. Marianna Bahena. Electronically signed by: James Botello 4/12/21 11:09 AM       I agree to the above documentation placed by my scribe James Botello. I reviewed the scribe's note and agree with the documented findings and plan of care. Any areas of disagreement are noted on the chart. I have personally evaluated this patient. Additional findings are as noted. I agree with the chief complaint, past medical history, past surgical history, allergies, medications, social and family history as documented unless otherwise noted below.      Electronically signed by Marianna Bahena MD on 4/12/2021 at 11:15 PM

## 2021-05-25 ENCOUNTER — HOSPITAL ENCOUNTER (EMERGENCY)
Age: 26
Discharge: HOME | End: 2021-05-25
Attending: EMERGENCY MEDICINE
Payer: MEDICARE

## 2021-05-25 VITALS
DIASTOLIC BLOOD PRESSURE: 100 MMHG | BODY MASS INDEX: 34.78 KG/M2 | WEIGHT: 189 LBS | SYSTOLIC BLOOD PRESSURE: 141 MMHG | RESPIRATION RATE: 14 BRPM | HEIGHT: 62 IN | HEART RATE: 110 BPM | OXYGEN SATURATION: 98 % | TEMPERATURE: 98.8 F

## 2021-05-25 DIAGNOSIS — K02.9 DENTAL CARIES: ICD-10-CM

## 2021-05-25 DIAGNOSIS — K08.89 PAIN, DENTAL: Primary | ICD-10-CM

## 2021-05-25 PROCEDURE — 6370000000 HC RX 637 (ALT 250 FOR IP): Performed by: STUDENT IN AN ORGANIZED HEALTH CARE EDUCATION/TRAINING PROGRAM

## 2021-05-25 PROCEDURE — 99283 EMERGENCY DEPT VISIT LOW MDM: CPT

## 2021-05-25 RX ORDER — ACETAMINOPHEN 500 MG
1000 TABLET ORAL ONCE
Status: COMPLETED | OUTPATIENT
Start: 2021-05-25 | End: 2021-05-25

## 2021-05-25 RX ORDER — PENICILLIN V POTASSIUM 500 MG/1
500 TABLET ORAL 4 TIMES DAILY
Qty: 28 TABLET | Refills: 0 | Status: SHIPPED | OUTPATIENT
Start: 2021-05-25 | End: 2021-06-01

## 2021-05-25 RX ORDER — ACETAMINOPHEN 500 MG
1000 TABLET ORAL 3 TIMES DAILY
Qty: 40 TABLET | Refills: 0 | Status: SHIPPED | OUTPATIENT
Start: 2021-05-25

## 2021-05-25 RX ORDER — IBUPROFEN 400 MG/1
600 TABLET ORAL ONCE
Status: COMPLETED | OUTPATIENT
Start: 2021-05-25 | End: 2021-05-25

## 2021-05-25 RX ORDER — PENICILLIN V POTASSIUM 250 MG/1
500 TABLET ORAL ONCE
Status: COMPLETED | OUTPATIENT
Start: 2021-05-25 | End: 2021-05-25

## 2021-05-25 RX ORDER — CHLORHEXIDINE GLUCONATE 0.12 MG/ML
15 RINSE ORAL 2 TIMES DAILY
Qty: 420 ML | Refills: 0 | Status: SHIPPED | OUTPATIENT
Start: 2021-05-25 | End: 2021-06-08

## 2021-05-25 RX ORDER — IBUPROFEN 600 MG/1
600 TABLET ORAL EVERY 6 HOURS PRN
Qty: 30 TABLET | Refills: 0 | Status: SHIPPED | OUTPATIENT
Start: 2021-05-25

## 2021-05-25 RX ADMIN — ACETAMINOPHEN 1000 MG: 500 TABLET ORAL at 20:44

## 2021-05-25 RX ADMIN — IBUPROFEN 600 MG: 400 TABLET, FILM COATED ORAL at 20:43

## 2021-05-25 RX ADMIN — BENZOCAINE: 200 GEL TOPICAL at 20:44

## 2021-05-25 RX ADMIN — PENICILLIN V POTASSIUM 500 MG: 250 TABLET ORAL at 20:43

## 2021-05-25 ASSESSMENT — ENCOUNTER SYMPTOMS
NAUSEA: 0
CONSTIPATION: 0
EYE PAIN: 0
DIARRHEA: 0
SINUS PAIN: 0
SORE THROAT: 0
SINUS PRESSURE: 0
EYE ITCHING: 0
SHORTNESS OF BREATH: 0
ABDOMINAL DISTENTION: 0
COUGH: 0
ABDOMINAL PAIN: 0

## 2021-05-25 ASSESSMENT — PAIN SCALES - GENERAL: PAINLEVEL_OUTOF10: 8

## 2021-05-25 ASSESSMENT — PAIN DESCRIPTION - PAIN TYPE: TYPE: ACUTE PAIN

## 2021-05-25 NOTE — ED PROVIDER NOTES
[facility] ED  Emergency Department Encounter  EmergencyMedicine Resident     Pt Name:Eduard Emery  MRN: [id number]  [place] 1995  Date of evaluation: 5/25/21  PCP:  Linnie Simmonds, DO    CHIEF COMPLAINT       Chief Complaint   Patient presents with    Dental Pain     headache        HISTORY OF PRESENT ILLNESS  (Location/Symptom, Timing/Onset, Context/Setting, Quality, Duration, Modifying Factors, Severity.)      Cristian Green is a 22 y.o. female who presents with right-sided upper and lower dental pain as well as left-sided upper dental pain for 2 days. Patient states that she has had dental issues since January and has not been evaluated by a dentist because she became pregnant and her dental provider stopped excepting her insurance. She has an appointment with her dentist tomorrow but has been experiencing increased pain today. She complains of associated headache. Denies associated fever, chest pain shortness of breath or abdominal pain. Otherwise healthy without any medical conditions. She does not have any medication allergies but she is currently breast-feeding. PAST MEDICAL / SURGICAL / SOCIAL / FAMILY HISTORY      has no past medical history on file. has a past surgical history that includes Tonsillectomy and Cystocopy.       Social History     Socioeconomic History    Marital status: Single     Spouse name: Not on file    Number of children: Not on file    Years of education: Not on file    Highest education level: Not on file   Occupational History    Not on file   Tobacco Use    Smoking status: Current Every Day Smoker     Packs/day: 0.50     Types: Cigarettes    Smokeless tobacco: Never Used   Vaping Use    Vaping Use: Never used   Substance and Sexual Activity    Alcohol use: No    Drug use: No    Sexual activity: Yes     Partners: Male   Other Topics Concern    Not on file   Social History Narrative    Not on file     Social Determinants of Health     Financial Resource Strain:     Difficulty of Paying Living Expenses:    Food Insecurity:     Worried About Running Out of Food in the Last Year:     [address] in the Last Year:    Transportation Needs:     Lack of Transportation (Medical):  Lack of Transportation (Non-Medical):    Physical Activity:     Days of Exercise per Week:     Minutes of Exercise per Session:    Stress:     Feeling of Stress :    Social Connections:     Frequency of Communication with Friends and Family:     Frequency of Social Gatherings with Friends and Family:     Attends Jewish Services:     Active Member of Clubs or Organizations:     Attends Club or Organization Meetings:     Marital Status:    Intimate Partner Violence:     Fear of Current or Ex-Partner:     Emotionally Abused:     Physically Abused:     Sexually Abused:        Family History   Problem Relation Age of Onset    Heart Disease Father     Asthma Brother        Allergies:  Patient has no known allergies. Home Medications:  Prior to Admission medications    Medication Sig Start Date End Date Taking? Authorizing Provider   acetaminophen (TYLENOL) 500 MG tablet Take 2 tablets by mouth 3 times daily 5/25/21  Yes Nicole Sampson DO   ibuprofen (ADVIL;MOTRIN) 600 MG tablet Take 1 tablet by mouth every 6 hours as needed for Pain 5/25/21  Yes Nicole Sampson DO   benzocaine (ORAJEL) 20 % GEL mucosal gel Take 1 Applicatorful by mouth 2 times daily as needed for Pain 5/25/21  Yes Nicole Sampson DO   chlorhexidine (PERIDEX) 0.12 % solution Take 15 mLs by mouth 2 times daily for 14 days 5/25/21 6/8/21 Yes Nicole Sampson DO   penicillin v potassium (VEETID) 500 MG tablet Take 1 tablet by mouth 4 times daily for 7 days 5/25/21 6/1/21 Yes Nicole Sampson DO       REVIEW OF SYSTEMS    (2-9 systems for level 4, 10 or more for level 5)      Review of Systems   Constitutional: Negative for activity change, chills and fever.    HENT: Positive for dental problem. Negative for congestion, sinus pressure, sinus pain and sore throat. Eyes: Negative for pain and itching. Respiratory: Negative for cough and shortness of breath. Cardiovascular: Negative for chest pain. Gastrointestinal: Negative for abdominal distention, abdominal pain, constipation, diarrhea and nausea. Endocrine: Negative for polyuria. Genitourinary: Negative for dysuria and frequency. Musculoskeletal: Negative for arthralgias. Skin: Negative for rash. Neurological: Negative for light-headedness and headaches. PHYSICAL EXAM   (up to 7 for level 4, 8 or more for level 5)      INITIAL VITALS:   BP (!) 141/100   Pulse 110   Temp 98.8 °F (37.1 °C)   Resp 14   Ht 5' 2\" (1.575 m)   Wt 189 lb (85.7 kg)   LMP 04/19/2021   SpO2 98%   BMI 34.57 kg/m²     Physical Exam  Vitals reviewed. Constitutional:       General: She is not in acute distress. HENT:      Head: Normocephalic and atraumatic. Ears:      Comments: Hearing grossly normal     Nose: Nose normal.      Mouth/Throat:      Mouth: Mucous membranes are moist.      Pharynx: Oropharynx is clear. Comments: Poor dentition. Tenderness to percussion of tooth right upper and lower posterior 2 molars and left upper rear molar. Gingival pallor. Multiple silver amalgam fillings. No obvious drainable abscess. No trismus. Submental/submandibular space is soft. No cervical lymphadenopathy. Tonsils absent. No pharyngeal erythema. Uvula midline. Eyes:      General: No scleral icterus. Conjunctiva/sclera: Conjunctivae normal.      Pupils: Pupils are equal, round, and reactive to light. Cardiovascular:      Rate and Rhythm: Normal rate and regular rhythm. Pulses: Normal pulses. Pulmonary:      Effort: Pulmonary effort is normal. No respiratory distress. Breath sounds: Normal breath sounds. Abdominal:      General: There is no distension. Tenderness:  There is no abdominal tenderness. There is no guarding. Musculoskeletal:      Cervical back: No muscular tenderness. Right lower leg: No edema. Left lower leg: No edema. Skin:     General: Skin is warm and dry. Capillary Refill: Capillary refill takes less than 2 seconds. Neurological:      General: No focal deficit present. Mental Status: She is alert and oriented to person, place, and time. Mental status is at baseline. Psychiatric:         Mood and Affect: Mood normal.         DIFFERENTIAL  DIAGNOSIS     PLAN (LABS / IMAGING / EKG):  No orders of the defined types were placed in this encounter. MEDICATIONS ORDERED:  Orders Placed This Encounter   Medications    acetaminophen (TYLENOL) tablet 1,000 mg    ibuprofen (ADVIL;MOTRIN) tablet 600 mg    benzocaine (ORAJEL) 20 % mucosal gel    penicillin v potassium (VEETID) tablet 500 mg    acetaminophen (TYLENOL) 500 MG tablet     Sig: Take 2 tablets by mouth 3 times daily     Dispense:  40 tablet     Refill:  0    ibuprofen (ADVIL;MOTRIN) 600 MG tablet     Sig: Take 1 tablet by mouth every 6 hours as needed for Pain     Dispense:  30 tablet     Refill:  0    benzocaine (ORAJEL) 20 % GEL mucosal gel     Sig: Take 1 Applicatorful by mouth 2 times daily as needed for Pain     Dispense:  9 g     Refill:  0    chlorhexidine (PERIDEX) 0.12 % solution     Sig: Take 15 mLs by mouth 2 times daily for 14 days     Dispense:  420 mL     Refill:  0    penicillin v potassium (VEETID) 500 MG tablet     Sig: Take 1 tablet by mouth 4 times daily for 7 days     Dispense:  28 tablet     Refill:  0       DIAGNOSTIC RESULTS / EMERGENCY DEPARTMENT COURSE / MDM   LAB RESULTS:  No results found for this visit on 05/25/21. IMPRESSION: David Meade is a 22 y.o. woman presenting for dental pain. Low concern for Arvind's angina, no tissue texture changes in the submandibular/submental space. There is no cervical lymphadenopathy and no trismus.   No obvious drainable abscess. Concern for periapical abscess or SAP. Patient does appear to have her wisdom teeth still in place all either fully or partially erupted which may be causing her dental pain as well. Plan to treat patient symptomatically. Offered dental blocks however, patient with significant dental anxiety and declined. Advised to start antibiotics and Tylenol and ibuprofen for pain control. EMERGENCY DEPARTMENT COURSE:  Patient seen and evaluated, VSS and nontoxic in appearance. Discussed multiple options for analgesia. Patient currently breast feeding, analgesia offered with this in mind. She declined dental blocks. Started antibiotics and pt agrees to f/u w/dentist as soon as possible. PROCEDURES:  none    CONSULTS:  None    CRITICAL CARE:  Please see attending note    FINAL IMPRESSION      1. Pain, dental    2.  Dental caries          DISPOSITION / PLAN     DISPOSITION Decision To Discharge 05/25/2021 08:21:42 PM      PATIENT REFERRED TO:  Babar Beal DO  [address]  [address] [facility] follow-up      DISCHARGE MEDICATIONS:  Discharge Medication List as of 5/25/2021  8:26 PM      START taking these medications    Details   acetaminophen (TYLENOL) 500 MG tablet Take 2 tablets by mouth 3 times daily, Disp-40 tablet, R-0Print      ibuprofen (ADVIL;MOTRIN) 600 MG tablet Take 1 tablet by mouth every 6 hours as needed for Pain, Disp-30 tablet, R-0Print      benzocaine (ORAJEL) 20 % GEL mucosal gel Take 1 Applicatorful by mouth 2 times daily as needed for Pain, Disp-9 g, R-0Print      chlorhexidine (PERIDEX) 0.12 % solution Take 15 mLs by mouth 2 times daily for 14 days, Disp-420 mL, R-0Print      penicillin v potassium (VEETID) 500 MG tablet Take 1 tablet by mouth 4 times daily for 7 days, Disp-28 tablet, R-0Print             Mayra Ambrocio DO  Emergency Medicine Resident    (Please note that portions of thisnote were completed with a voice recognition program. Efforts were made to edit the dictations but occasionally words are mis-transcribed.)      Holley Haley DO  Resident  05/25/21 2110

## 2021-05-26 NOTE — ED PROVIDER NOTES
[facility]  ED  eMERGENCY dEPARTMENT eNCOUnter   Attending Attestation     Pt Name: Jihan Forte  MRN: [id number]  [place] 1995  Date of evaluation: 5/25/21       Jihan Forte is a 22 y.o. female who presents with Dental Pain (headache )      History: Presents with dental pain and headache. Patient says that she was will see dentist several months ago but had a baby instead and could not get in the dentist because of her insurance. Exam: Patient has severe dental decay especially over the right lower teeth. No signs of abscess. Plan for antibiotics, pain control, discharge. Patient offered dental block but she refused. I performed a history and physical examination of the patient and discussed management with the resident. I reviewed the residents note and agree with the documented findings and plan of care. Any areas of disagreement are noted on the chart. I was personally present for the key portions of any procedures. I have documented in the chart those procedures where I was not present during the key portions. I have personally reviewed all images and agree with the resident's interpretation. I have reviewed the emergency nurses triage note. I agree with the chief complaint, past medical history, past surgical history, allergies, medications, social and family history as documented unless otherwise noted below. Documentation of the HPI, Physical Exam and Medical Decision Making performed by medical students or scribes is based on my personal performance of the HPI, PE and MDM. For Phys Assistant/ Nurse Practitioner cases/documentation I have had a face to face evaluation of this patient and have completed at least one if not all key elements of the E/M (history, physical exam, and MDM). Additional findings are as noted.     For APC cases I have personally evaluated and examined the patient in conjunction with the APC and agree with the treatment plan and disposition of the patient as recorded by the APC.     Kerline Juares MD  Attending Emergency  Physician       Marli Do MD  05/25/21 2028

## 2021-08-01 ENCOUNTER — APPOINTMENT (OUTPATIENT)
Dept: CT IMAGING | Age: 26
End: 2021-08-01
Payer: MEDICARE

## 2021-08-01 ENCOUNTER — HOSPITAL ENCOUNTER (EMERGENCY)
Age: 26
Discharge: HOME | End: 2021-08-01
Attending: EMERGENCY MEDICINE
Payer: MEDICARE

## 2021-08-01 VITALS
RESPIRATION RATE: 18 BRPM | HEART RATE: 94 BPM | SYSTOLIC BLOOD PRESSURE: 117 MMHG | OXYGEN SATURATION: 99 % | DIASTOLIC BLOOD PRESSURE: 74 MMHG | TEMPERATURE: 98.1 F

## 2021-08-01 DIAGNOSIS — K08.89 PAIN, DENTAL: Primary | ICD-10-CM

## 2021-08-01 LAB
ABSOLUTE EOS #: 0.08 K/UL (ref 0–0.44)
ABSOLUTE IMMATURE GRANULOCYTE: <0.03 K/UL (ref 0–0.3)
ABSOLUTE LYMPH #: 2.63 K/UL (ref 1.1–3.7)
ABSOLUTE MONO #: 0.51 K/UL (ref 0.1–1.2)
ANION GAP SERPL CALCULATED.3IONS-SCNC: 13 MMOL/L (ref 9–17)
BASOPHILS # BLD: 1 % (ref 0–2)
BASOPHILS ABSOLUTE: 0.05 K/UL (ref 0–0.2)
BUN BLDV-MCNC: 13 MG/DL (ref 6–20)
BUN/CREAT BLD: NORMAL (ref 9–20)
C-REACTIVE PROTEIN: <3 MG/L (ref 0–5)
CALCIUM SERPL-MCNC: 9.5 MG/DL (ref 8.6–10.4)
CHLORIDE BLD-SCNC: 104 MMOL/L (ref 98–107)
CO2: 21 MMOL/L (ref 20–31)
CREAT SERPL-MCNC: 0.59 MG/DL (ref 0.5–0.9)
DIFFERENTIAL TYPE: ABNORMAL
EOSINOPHILS RELATIVE PERCENT: 1 % (ref 1–4)
GFR AFRICAN AMERICAN: >60 ML/MIN
GFR NON-AFRICAN AMERICAN: >60 ML/MIN
GFR SERPL CREATININE-BSD FRML MDRD: NORMAL ML/MIN/{1.73_M2}
GFR SERPL CREATININE-BSD FRML MDRD: NORMAL ML/MIN/{1.73_M2}
GLUCOSE BLD-MCNC: 92 MG/DL (ref 70–99)
HCT VFR BLD CALC: 45.1 % (ref 36.3–47.1)
HEMOGLOBIN: 14.8 G/DL (ref 11.9–15.1)
IMMATURE GRANULOCYTES: 0 %
LYMPHOCYTES # BLD: 27 % (ref 24–43)
MCH RBC QN AUTO: 29.9 PG (ref 25.2–33.5)
MCHC RBC AUTO-ENTMCNC: 32.8 G/DL (ref 28.4–34.8)
MCV RBC AUTO: 91.1 FL (ref 82.6–102.9)
MONOCYTES # BLD: 5 % (ref 3–12)
NRBC AUTOMATED: 0 PER 100 WBC
PDW BLD-RTO: 13.2 % (ref 11.8–14.4)
PLATELET # BLD: 412 K/UL (ref 138–453)
PLATELET ESTIMATE: ABNORMAL
PMV BLD AUTO: 8.8 FL (ref 8.1–13.5)
POTASSIUM SERPL-SCNC: 4.4 MMOL/L (ref 3.7–5.3)
RBC # BLD: 4.95 M/UL (ref 3.95–5.11)
RBC # BLD: ABNORMAL 10*6/UL
SEDIMENTATION RATE, ERYTHROCYTE: 4 MM (ref 0–20)
SEG NEUTROPHILS: 66 % (ref 36–65)
SEGMENTED NEUTROPHILS ABSOLUTE COUNT: 6.37 K/UL (ref 1.5–8.1)
SODIUM BLD-SCNC: 138 MMOL/L (ref 135–144)
WBC # BLD: 9.7 K/UL (ref 3.5–11.3)
WBC # BLD: ABNORMAL 10*3/UL

## 2021-08-01 PROCEDURE — 6360000004 HC RX CONTRAST MEDICATION: Performed by: STUDENT IN AN ORGANIZED HEALTH CARE EDUCATION/TRAINING PROGRAM

## 2021-08-01 PROCEDURE — 85652 RBC SED RATE AUTOMATED: CPT

## 2021-08-01 PROCEDURE — 86140 C-REACTIVE PROTEIN: CPT

## 2021-08-01 PROCEDURE — 6360000002 HC RX W HCPCS: Performed by: STUDENT IN AN ORGANIZED HEALTH CARE EDUCATION/TRAINING PROGRAM

## 2021-08-01 PROCEDURE — 96374 THER/PROPH/DIAG INJ IV PUSH: CPT

## 2021-08-01 PROCEDURE — 70491 CT SOFT TISSUE NECK W/DYE: CPT

## 2021-08-01 PROCEDURE — 80048 BASIC METABOLIC PNL TOTAL CA: CPT

## 2021-08-01 PROCEDURE — 99281 EMR DPT VST MAYX REQ PHY/QHP: CPT

## 2021-08-01 PROCEDURE — 85025 COMPLETE CBC W/AUTO DIFF WBC: CPT

## 2021-08-01 RX ORDER — PENICILLIN V POTASSIUM 500 MG/1
500 TABLET ORAL 4 TIMES DAILY
Qty: 40 TABLET | Refills: 0 | Status: SHIPPED | OUTPATIENT
Start: 2021-08-01 | End: 2021-08-01

## 2021-08-01 RX ORDER — KETOROLAC TROMETHAMINE 15 MG/ML
15 INJECTION, SOLUTION INTRAMUSCULAR; INTRAVENOUS ONCE
Status: COMPLETED | OUTPATIENT
Start: 2021-08-01 | End: 2021-08-01

## 2021-08-01 RX ORDER — AMOXICILLIN AND CLAVULANATE POTASSIUM 875; 125 MG/1; MG/1
1 TABLET, FILM COATED ORAL 2 TIMES DAILY
Qty: 14 TABLET | Refills: 0 | Status: SHIPPED | OUTPATIENT
Start: 2021-08-01 | End: 2021-08-08

## 2021-08-01 RX ADMIN — IOPAMIDOL 75 ML: 755 INJECTION, SOLUTION INTRAVENOUS at 17:51

## 2021-08-01 RX ADMIN — KETOROLAC TROMETHAMINE 15 MG: 15 INJECTION, SOLUTION INTRAMUSCULAR; INTRAVENOUS at 17:32

## 2021-08-01 ASSESSMENT — ENCOUNTER SYMPTOMS
SHORTNESS OF BREATH: 0
COUGH: 0
VOMITING: 0
BACK PAIN: 0
VOICE CHANGE: 0
NAUSEA: 0
RHINORRHEA: 0
FACIAL SWELLING: 1
DIARRHEA: 0
ABDOMINAL PAIN: 0
TROUBLE SWALLOWING: 1
SORE THROAT: 1

## 2021-08-01 ASSESSMENT — PAIN SCALES - GENERAL: PAINLEVEL_OUTOF10: 10

## 2021-08-01 NOTE — ED PROVIDER NOTES
[facility] ED  Emergency Department Encounter  Emergency Medicine Resident     Pt Name: Ysabel Yuan  MRN: [id number]  [place] 1995  Date of evaluation: 8/1/21  PCP:  Tonya Lebron DO    CHIEF COMPLAINT       Chief Complaint   Patient presents with    Dental Pain       HISTORY Dru  (Location/Symptom, Timing/Onset, Context/Setting, Quality, Duration, Modifying Factors,Severity.)      Ysabel Yuan is a 22 y.o. female who presents with left-sided mouth, jaw, and facial pain. States this has worsened over the past few days. She has struggled with dental problems for months now, and has previously been on antibiotics but nothing recently. She states over the last few days she has developed extreme pain and some mild swelling throughout the entire left side of her face, extending from the cheek back behind the ear and upper neck. She has difficulty eating and drinking and opening her mouth due to the pain. Fevers or chills. No chest pain or shortness of breath. She has not seen any focal abscess. She has a dentist appointment this week. Patient denies other complaints at this time, she is currently breast-feeding. Ibuprofen has not helped the pain. PAST MEDICAL / SURGICAL / SOCIAL / FAMILY HISTORY      has no past medical history on file. has a past surgical history that includes Tonsillectomy and Cystocopy.      Social History     Socioeconomic History    Marital status: Single     Spouse name: Not on file    Number of children: Not on file    Years of education: Not on file    Highest education level: Not on file   Occupational History    Not on file   Tobacco Use    Smoking status: Current Every Day Smoker     Packs/day: 0.50     Types: Cigarettes    Smokeless tobacco: Never Used   Vaping Use    Vaping Use: Never used   Substance and Sexual Activity    Alcohol use: No    Drug use: No    Sexual activity: Yes     Partners: Male sore throat and trouble swallowing. Negative for congestion, rhinorrhea and voice change. Eyes: Negative for visual disturbance. Respiratory: Negative for cough and shortness of breath. Cardiovascular: Negative for chest pain. Gastrointestinal: Negative for abdominal pain, diarrhea, nausea and vomiting. Genitourinary: Negative for dysuria. Musculoskeletal: Negative for back pain and neck pain. Skin: Negative for rash. Neurological: Negative for weakness, numbness and headaches. PHYSICAL EXAM   (up to 7 for level 4, 8 or more forlevel 5)      INITIAL VITALS:     Vitals:    08/01/21 1924   BP: 117/74   Pulse: 94   Resp: 18   Temp: 98.1 °F (36.7 °C)   TempSrc: Oral   SpO2: 99%       Physical Exam  Constitutional:       General: She is not in acute distress. Appearance: Normal appearance. She is not ill-appearing, toxic-appearing or diaphoretic. Comments: Patient appears uncomfortable due to pain. HENT:      Head: Normocephalic and atraumatic. Comments: Patient has evidence of trismus on the left on exam, not able to open her mouth widely without significant pain. No change in phonation, no drooling or inability to control secretions, no tongue elevation. She has tenderness to palpation along the left cheek and gum lines. No obvious abscesses or areas of fluctuance. Dentition in general is poor. Nose: Nose normal.   Eyes:      Extraocular Movements: Extraocular movements intact. Pupils: Pupils are equal, round, and reactive to light. Cardiovascular:      Rate and Rhythm: Normal rate and regular rhythm. Heart sounds: Normal heart sounds. No murmur heard. Pulmonary:      Effort: Pulmonary effort is normal. No respiratory distress. Breath sounds: Normal breath sounds. No wheezing, rhonchi or rales. Abdominal:      General: There is no distension. Palpations: Abdomen is soft. Tenderness: There is no abdominal tenderness.  There is no guarding or rebound. Musculoskeletal:         General: Normal range of motion. Cervical back: Normal range of motion and neck supple. Right lower leg: No edema. Left lower leg: No edema. Skin:     General: Skin is warm and dry. Neurological:      General: No focal deficit present. Mental Status: She is alert and oriented to person, place, and time. Motor: No weakness. Psychiatric:         Mood and Affect: Mood normal.         DIFFERENTIAL  DIAGNOSIS     PLAN (LABS / IMAGING / EKG):  Orders Placed This Encounter   Procedures    CT SOFT TISSUE NECK W CONTRAST    CBC Auto Differential    Basic Metabolic Panel w/ Reflex to MG    C-REACTIVE PROTEIN    Sedimentation Rate       MEDICATIONS ORDERED:  Orders Placed This Encounter   Medications    ketorolac (TORADOL) injection 15 mg    iopamidol (ISOVUE-370) 76 % injection 75 mL    DISCONTD: penicillin v potassium (VEETID) 500 MG tablet     Sig: Take 1 tablet by mouth 4 times daily for 10 days     Dispense:  40 tablet     Refill:  0    amoxicillin-clavulanate (AUGMENTIN) 875-125 MG per tablet     Sig: Take 1 tablet by mouth 2 times daily for 7 days     Dispense:  14 tablet     Refill:  0     Initial MDM/Plan/ED COURSE:    22 y.o. female who presents with left-sided facial pain and swelling. On exam, patient appears uncomfortable, vitals are stable. There is evidence of trismus on exam, no change in phonation, no elevation of the tongue, no inability to control secretions. Oropharynx is otherwise clear and moist without tonsillar exudate or erythema. Dentition is poor. He has tenderness to palpation along the entire left side of the face, minimal swelling noted in general.  Heart regular rate and rhythm and lungs clear to auscultation bilaterally. No other focal findings on exam.    Given evidence of trismus, concern for infection within the masseter muscle or elsewhere surrounding the mouth.   Will do CBC, BMP, CRP, sed rate in addition to CT soft tissue of the neck. Patient provided with analgesics. Will reassess. ED Course as of Aug 01 1937   Manuelito Cantrell Aug 01, 2021   1747 CBC unremarkable, WBC 9.7. Sed rate and CRP within normal limits. BMP unremarkable. Awaiting CT scan. [JS]   1838 FINDINGS:  PHARYNX/LARYNX:  Nasopharynx appears unremarkable. Oral cavity is partially  obscured by artifact from dental amalgams. Oropharynx appears unremarkable. Hypopharynx appears unremarkable. Epiglottis appears normal. Larynx appears  unremarkable. Visualized airway is patent.     SALIVARY/THYROID GLANDS:  The parotid and submandibular glands appear  unremarkable. Thyroid gland appears unremarkable.     LYMPH NODES:  No cervical or supraclavicular lymphadenopathy.     SOFT TISSUES:  No appreciable soft tissue swelling.     BRAIN/ORBITS/SINUSES: Imaged portion of the brain demonstrates no midline  shift. The visualized portion of the orbits demonstrate no acute  abnormality. Paranasal sinuses are clear. Mastoid air cells are clear.     BONES:  No aggressive appearing lytic or blastic bony lesion.     LUNG APICES/SUPERIOR MEDIASTINUM: No focal consolidation within the  visualized lung apices. No superior mediastinal lymphadenopathy or mass. Residual thymic tissue noted.     IMPRESSION:  1. No acute process by CT. 2. Other findings as described. CT SOFT TISSUE NECK W CONTRAST [JS]      ED Course User Index  [JS] Tabatha Flanagan DO      Patient updated on CT findings. She was discharged with Augmentin for possible soft tissue infection. We discussed the importance of continuing to follow-up with dentistry. Patient does have an appointment this week. She was instructed to use Tylenol and ibuprofen as needed for pain control.      DIAGNOSTIC RESULTS / EMERGENCYDEPARTMENT COURSE / MDM     LABS:  Labs Reviewed   CBC WITH AUTO DIFFERENTIAL - Abnormal; Notable for the following components:       Result Value    Seg Neutrophils 66 (*)     All other components within normal limits   BASIC METABOLIC PANEL W/ REFLEX TO MG FOR LOW K   C-REACTIVE PROTEIN   SEDIMENTATION RATE           CT SOFT TISSUE NECK W CONTRAST    Result Date: 8/1/2021  EXAMINATION: CT OF THE NECK SOFT TISSUE WITH CONTRAST  8/1/2021 TECHNIQUE: CT of the neck was performed with the administration of intravenous contrast. Multiplanar reformatted images are provided for review. Dose modulation, iterative reconstruction, and/or weight based adjustment of the mA/kV was utilized to reduce the radiation dose to as low as reasonably achievable. COMPARISON: None. HISTORY: ORDERING SYSTEM PROVIDED HISTORY: trismus, left sided facial pain TECHNOLOGIST PROVIDED HISTORY: trismus, left sided facial pain Decision Support Exception - unselect if not a suspected or confirmed emergency medical condition->Emergency Medical Condition (MA) FINDINGS: PHARYNX/LARYNX:  Nasopharynx appears unremarkable. Oral cavity is partially obscured by artifact from dental amalgams. Oropharynx appears unremarkable. Hypopharynx appears unremarkable. Epiglottis appears normal. Larynx appears unremarkable. Visualized airway is patent. SALIVARY/THYROID GLANDS:  The parotid and submandibular glands appear unremarkable. Thyroid gland appears unremarkable. LYMPH NODES:  No cervical or supraclavicular lymphadenopathy. SOFT TISSUES:  No appreciable soft tissue swelling. BRAIN/ORBITS/SINUSES: Imaged portion of the brain demonstrates no midline shift. The visualized portion of the orbits demonstrate no acute abnormality. Paranasal sinuses are clear. Mastoid air cells are clear. BONES:  No aggressive appearing lytic or blastic bony lesion. LUNG APICES/SUPERIOR MEDIASTINUM: No focal consolidation within the visualized lung apices. No superior mediastinal lymphadenopathy or mass. Residual thymic tissue noted. 1. No acute process by CT. 2. Other findings as described.          EKG      All EKG's are interpreted by the Emergency Department Physicianwho either signs or Co-signs this chart in the absence of a cardiologist.      PROCEDURES:  None    CONSULTS:  None    CRITICAL CARE:  Please see attending note    FINAL IMPRESSION      1. Pain, dental          DISPOSITION / PLAN     DISPOSITION Decision To Discharge 08/01/2021 06:41:09 PM      PATIENT REFERRED TO:  No follow-up provider specified.     DISCHARGE MEDICATIONS:  Discharge Medication List as of 8/1/2021  6:51 PM      START taking these medications    Details   amoxicillin-clavulanate (AUGMENTIN) 875-125 MG per tablet Take 1 tablet by mouth 2 times daily for 7 days, Disp-14 tablet, R-0Print             Rahel Sawant DO  Emergency Medicine Resident    (Please note that portions of this note were completed with a voice recognition program.Efforts were made to edit the dictations but occasionally words are mis-transcribed.)        Rahel Sawant DO  Resident  08/01/21 [id number]

## 2021-08-01 NOTE — ED PROVIDER NOTES
[facility]     Emergency Department     Faculty Attestation    I performed a history and physical examination of the patient and discussed management with the resident. I reviewed the residents note and agree with the documented findings including all diagnostic interpretations and plan of care. Any areas of disagreement are noted on the chart. I was personally present for the key portions of any procedures. I have documented in the chart those procedures where I was not present during the key portions. I have reviewed the emergency nurses triage note. I agree with the chief complaint, past medical history, past surgical history, allergies, medications, social and family history as documented unless otherwise noted below. Documentation of the HPI, Physical Exam and Medical Decision Making performed by scribbeata is based on my personal performance of the HPI, PE and MDM. For Physician Assistant/ Nurse Practitioner cases/documentation I have personally evaluated this patient and have completed at least one if not all key elements of the E/M (history, physical exam, and MDM). Additional findings are as noted. This patient was evaluated in the Emergency Department for symptoms described in the history of present illness. He/she was evaluated in the context of the global COVID-19 pandemic, which necessitated consideration that the patient might be at risk for infection with the SARS-CoV-2 virus that causes COVID-19. Institutional protocols and algorithms that pertain to the evaluation of patients at risk for COVID-19 are in a state of rapid change based on information released by regulatory bodies including the CDC and federal and state organizations. These policies and algorithms were followed during the patient's care in the ED. Primary Care Physician: Jaz Gagnon DO    History:  This is a 22 y.o. female who presents to the Emergency Department with complaint of facial pain and swelling. Left-sided. Several days. No fevers. Difficulty with opening her mouth as well as chewing. Physical:     vitals were not taken for this visit. 22 y.o. female appears uncomfortable, mild edema over the left cheek and submandibular region.   There is evidence of trismus on exam.  She is tolerating her secretions no drooling no difficulty with phonation no muffled voice no tongue elevation    Impression: Masseter spasm, suspicious for infectious etiology likely of dental origin    Plan: CT soft tissue neck with contrast, labs, analgesia, likely need for antibiotics    John Aguilera MD, [facility]  Attending Emergency Physician         Elisabeth Jansen MD  08/01/21 2005

## 2021-09-21 ENCOUNTER — OFFICE VISIT (OUTPATIENT)
Dept: OBGYN CLINIC | Age: 26
End: 2021-09-21
Payer: MEDICARE

## 2021-09-21 VITALS
BODY MASS INDEX: 28.71 KG/M2 | DIASTOLIC BLOOD PRESSURE: 70 MMHG | SYSTOLIC BLOOD PRESSURE: 98 MMHG | WEIGHT: 156 LBS | HEIGHT: 62 IN

## 2021-09-21 DIAGNOSIS — N76.0 ACUTE VAGINITIS: ICD-10-CM

## 2021-09-21 DIAGNOSIS — L73.2 HIDRADENITIS SUPPURATIVA: ICD-10-CM

## 2021-09-21 DIAGNOSIS — Z01.419 WELL WOMAN EXAM: Primary | ICD-10-CM

## 2021-09-21 PROCEDURE — 4004F PT TOBACCO SCREEN RCVD TLK: CPT | Performed by: SPECIALIST

## 2021-09-21 PROCEDURE — G8427 DOCREV CUR MEDS BY ELIG CLIN: HCPCS | Performed by: SPECIALIST

## 2021-09-21 PROCEDURE — 99395 PREV VISIT EST AGE 18-39: CPT | Performed by: SPECIALIST

## 2021-09-21 PROCEDURE — G8417 CALC BMI ABV UP PARAM F/U: HCPCS | Performed by: SPECIALIST

## 2021-09-21 RX ORDER — METRONIDAZOLE 500 MG/1
500 TABLET ORAL 2 TIMES DAILY
Qty: 14 TABLET | Refills: 0 | Status: SHIPPED | OUTPATIENT
Start: 2021-09-21 | End: 2021-09-28

## 2021-09-21 RX ORDER — CHLORHEXIDINE GLUCONATE 0.12 MG/ML
RINSE ORAL
COMMUNITY
Start: 2021-08-31

## 2021-09-21 RX ORDER — FLUCONAZOLE 100 MG/1
100 TABLET ORAL DAILY
Qty: 7 TABLET | Refills: 0 | Status: SHIPPED | OUTPATIENT
Start: 2021-09-21 | End: 2021-09-28

## 2021-09-21 ASSESSMENT — ENCOUNTER SYMPTOMS
VOMITING: 0
EYE PAIN: 0
APNEA: 0
DIARRHEA: 0
COUGH: 0
ABDOMINAL PAIN: 0
CONSTIPATION: 0
ABDOMINAL DISTENTION: 0
NAUSEA: 0

## 2021-09-21 NOTE — PROGRESS NOTES
Subjective:      Patient ID: David Sanches is a 22 y.o. female. Chief Complaint   Patient presents with    Annual Exam     BP 98/70 (Site: Right Upper Arm, Position: Sitting, Cuff Size: Medium Adult)   Ht 5' 2\" (1.575 m)   Wt 156 lb (70.8 kg)   BMI 28.53 kg/m²   No LMP recorded. [id number]    History reviewed. No pertinent past medical history. Current Outpatient Medications Ordered in Epic   Medication Sig Dispense Refill    chlorhexidine (PERIDEX) 0.12 % solution SWITH AND SPIT 10 MLS 2 TIMES PER DAY, START DAY AFTER SURGERY      metroNIDAZOLE (FLAGYL) 500 MG tablet Take 1 tablet by mouth 2 times daily for 7 days 14 tablet 0    fluconazole (DIFLUCAN) 100 MG tablet Take 1 tablet by mouth daily for 7 days 7 tablet 0    acetaminophen (TYLENOL) 500 MG tablet Take 2 tablets by mouth 3 times daily 40 tablet 0    ibuprofen (ADVIL;MOTRIN) 600 MG tablet Take 1 tablet by mouth every 6 hours as needed for Pain 30 tablet 0     No current Epic-ordered facility-administered medications on file. Problem List Items Addressed This Visit     Acute vaginitis - Primary    Hidradenitis suppurativa        No Known Allergies  No orders of the defined types were placed in this encounter. Patient is here today for her annual exam.  Patient states that she had oral surgery a couple weeks ago and was on a strong antibiotic. She has noticed a vaginal discharge since then. Review of Systems   Constitutional: Negative for activity change, appetite change and fever. HENT: Negative for ear discharge and ear pain. Eyes: Negative for pain and visual disturbance. Respiratory: Negative for apnea and cough. Cardiovascular: Negative for chest pain, palpitations and leg swelling. Gastrointestinal: Negative for abdominal distention, abdominal pain, constipation, diarrhea, nausea and vomiting. Endocrine: Negative. Genitourinary: Positive for vaginal discharge.  Negative for difficulty urinating, dysuria, menstrual problem and pelvic pain. Musculoskeletal: Negative for neck pain and neck stiffness. Skin: Negative. Neurological: Negative for light-headedness and numbness. Hematological: Negative. Does not bruise/bleed easily. Objective:   Physical Exam  Vitals and nursing note reviewed. Exam conducted with a chaperone present. Constitutional:       Appearance: She is well-developed. HENT:      Head: Normocephalic and atraumatic. Neck:      Thyroid: No thyroid mass or thyromegaly. Cardiovascular:      Rate and Rhythm: Normal rate and regular rhythm. Pulmonary:      Effort: Pulmonary effort is normal.      Breath sounds: Normal breath sounds. Chest:      Breasts:         Right: Normal. No inverted nipple, mass, nipple discharge, skin change or tenderness. Left: Normal. No inverted nipple, mass, nipple discharge, skin change or tenderness. Abdominal:      General: Bowel sounds are normal. There is no distension. Palpations: Abdomen is soft. There is no mass. Tenderness: There is no abdominal tenderness. There is no guarding or rebound. Hernia: There is no hernia in the left inguinal area. Genitourinary:     General: Normal vulva. Exam position: Supine. Labia:         Right: No rash or lesion. Left: No rash or lesion. Vagina: No signs of injury. Vaginal discharge present. No tenderness. Cervix: No cervical motion tenderness or discharge. Uterus: Normal. Not enlarged, not fixed and not tender. Adnexa: Right adnexa normal and left adnexa normal.        Right: No mass or tenderness. Left: No mass or tenderness. Rectum: Normal. No mass or anal fissure. Normal anal tone. Musculoskeletal:         General: No tenderness. Normal range of motion. Skin:     General: Skin is warm and dry.       Comments: Healed hidradenitis suppurativa on bilateral upper thighs   Neurological:      Mental Status: She is alert and oriented to person, place, and time. Psychiatric:         Judgment: Judgment normal.         Assessment:      Patient with vaginitis, otherwise normal annual exam.  Pap smear was not done. Will treat with Flagyl and Diflucan. Patient was told to return in 1 year and a pap smear with HPV testing will be done. Patient with healed hidradenitis suppurativa. Patient was told if the abscess return, she should call the office for an appointment. Plan:      Orders Placed This Encounter   Medications    metroNIDAZOLE (FLAGYL) 500 MG tablet     Sig: Take 1 tablet by mouth 2 times daily for 7 days     Dispense:  14 tablet     Refill:  0    fluconazole (DIFLUCAN) 100 MG tablet     Sig: Take 1 tablet by mouth daily for 7 days     Dispense:  7 tablet     Refill:  0     Appointment in 1 year. Destinee Floyd am scribing for, and in the presence of Dr. Jaspal Benjamin. Electronically signed by: Henry Self 9/21/21 1:31 PM       I agree to the above documentation placed by my scribe Henry Self. I reviewed the scribe's note and agree with the documented findings and plan of care. Any areas of disagreement are noted on the chart. I have personally evaluated this patient. Additional findings are as noted. I agree with the chief complaint, past medical history, past surgical history, allergies, medications, social and family history as documented unless otherwise noted below.      Electronically signed by Jaspal Benjamin MD on 9/21/2021 at 1:39 PM

## 2022-06-22 ENCOUNTER — HOSPITAL ENCOUNTER (OUTPATIENT)
Age: 27
Setting detail: SPECIMEN
Discharge: HOME | End: 2022-06-22

## 2022-06-22 ENCOUNTER — OFFICE VISIT (OUTPATIENT)
Dept: OBGYN CLINIC | Age: 27
End: 2022-06-22
Payer: MEDICARE

## 2022-06-22 VITALS
HEART RATE: 98 BPM | SYSTOLIC BLOOD PRESSURE: 116 MMHG | BODY MASS INDEX: 33.68 KG/M2 | WEIGHT: 183 LBS | DIASTOLIC BLOOD PRESSURE: 74 MMHG | HEIGHT: 62 IN

## 2022-06-22 DIAGNOSIS — N76.0 ACUTE VAGINITIS: ICD-10-CM

## 2022-06-22 DIAGNOSIS — Z32.01 POSITIVE PREGNANCY TEST: ICD-10-CM

## 2022-06-22 DIAGNOSIS — N92.6 MISSED MENSES: Primary | ICD-10-CM

## 2022-06-22 DIAGNOSIS — O36.80X0 PREGNANCY WITH INCONCLUSIVE FETAL VIABILITY, SINGLE OR UNSPECIFIED FETUS: ICD-10-CM

## 2022-06-22 DIAGNOSIS — O26.859 SPOTTING IN PREGNANCY: ICD-10-CM

## 2022-06-22 DIAGNOSIS — N92.6 MISSED MENSES: ICD-10-CM

## 2022-06-22 LAB
CONTROL: ABNORMAL
PREGNANCY TEST URINE, POC: POSITIVE

## 2022-06-22 PROCEDURE — G8417 CALC BMI ABV UP PARAM F/U: HCPCS | Performed by: CLINICAL NURSE SPECIALIST

## 2022-06-22 PROCEDURE — 4004F PT TOBACCO SCREEN RCVD TLK: CPT | Performed by: CLINICAL NURSE SPECIALIST

## 2022-06-22 PROCEDURE — 99214 OFFICE O/P EST MOD 30 MIN: CPT | Performed by: CLINICAL NURSE SPECIALIST

## 2022-06-22 PROCEDURE — 81025 URINE PREGNANCY TEST: CPT | Performed by: CLINICAL NURSE SPECIALIST

## 2022-06-22 PROCEDURE — G8427 DOCREV CUR MEDS BY ELIG CLIN: HCPCS | Performed by: CLINICAL NURSE SPECIALIST

## 2022-06-22 PROCEDURE — 36415 COLL VENOUS BLD VENIPUNCTURE: CPT | Performed by: CLINICAL NURSE SPECIALIST

## 2022-06-22 RX ORDER — VITAMIN A ACETATE, .BETA.-CAROTENE, ASCORBIC ACID, CHOLECALCIFEROL, .ALPHA.-TOCOPHEROL ACETATE, DL-, THIAMINE MONONITRATE, RIBOFLAVIN, NIACINAMIDE, PYRIDOXINE HYDROCHLORIDE, FOLIC ACID, CYANOCOBALAMIN, CALCIUM CARBONATE, FERROUS FUMARATE, ZINC OXIDE, AND CUPRIC OXIDE 2000; 2000; 120; 400; 22; 1.84; 3; 20; 10; 1; 12; 200; 27; 25; 2 [IU]/1; [IU]/1; MG/1; [IU]/1; MG/1; MG/1; MG/1; MG/1; MG/1; MG/1; UG/1; MG/1; MG/1; MG/1; MG/1
1 TABLET ORAL DAILY
Qty: 30 TABLET | Refills: 11 | Status: SHIPPED | OUTPATIENT
Start: 2022-06-22 | End: 2022-07-22

## 2022-06-22 SDOH — ECONOMIC STABILITY: FOOD INSECURITY: WITHIN THE PAST 12 MONTHS, YOU WORRIED THAT YOUR FOOD WOULD RUN OUT BEFORE YOU GOT MONEY TO BUY MORE.: NEVER TRUE

## 2022-06-22 SDOH — ECONOMIC STABILITY: FOOD INSECURITY: WITHIN THE PAST 12 MONTHS, THE FOOD YOU BOUGHT JUST DIDN'T LAST AND YOU DIDN'T HAVE MONEY TO GET MORE.: NEVER TRUE

## 2022-06-22 SDOH — ECONOMIC STABILITY: TRANSPORTATION INSECURITY
IN THE PAST 12 MONTHS, HAS LACK OF TRANSPORTATION KEPT YOU FROM MEETINGS, WORK, OR FROM GETTING THINGS NEEDED FOR DAILY LIVING?: NO

## 2022-06-22 SDOH — ECONOMIC STABILITY: TRANSPORTATION INSECURITY
IN THE PAST 12 MONTHS, HAS THE LACK OF TRANSPORTATION KEPT YOU FROM MEDICAL APPOINTMENTS OR FROM GETTING MEDICATIONS?: NO

## 2022-06-22 ASSESSMENT — PATIENT HEALTH QUESTIONNAIRE - PHQ9
SUM OF ALL RESPONSES TO PHQ QUESTIONS 1-9: 0
SUM OF ALL RESPONSES TO PHQ QUESTIONS 1-9: 0
1. LITTLE INTEREST OR PLEASURE IN DOING THINGS: 0
SUM OF ALL RESPONSES TO PHQ QUESTIONS 1-9: 0
SUM OF ALL RESPONSES TO PHQ9 QUESTIONS 1 & 2: 0
2. FEELING DOWN, DEPRESSED OR HOPELESS: 0
SUM OF ALL RESPONSES TO PHQ QUESTIONS 1-9: 0

## 2022-06-22 ASSESSMENT — SOCIAL DETERMINANTS OF HEALTH (SDOH): HOW HARD IS IT FOR YOU TO PAY FOR THE VERY BASICS LIKE FOOD, HOUSING, MEDICAL CARE, AND HEATING?: NOT HARD AT ALL

## 2022-06-22 NOTE — PROGRESS NOTES
DATE OF VISIT:  22  PATIENT NAME:  Bria Bernstein     YOB: 1995    SUBJECTIVE:    Chief Complaint:  Chief Complaint   Patient presents with    Amenorrhea       HPI:  Jaelyn Varghese  is being seen today for missed menses. She reports a  positive pregnancy test on 22. This  is not a planned pregnancy. She is  accepting at this time. LMP: 22     Sure and definite: Yes     28 day cycle: No    She was not on a contraceptive at the time of conception. Estimated weeks gestation today : 7w 6d  Tentative TINY: 23    Relationship with FOB: involved    OBJECTIVE:    Vitals:    22 1401   BP: 116/74   Pulse: 98   Weight: 183 lb (83 kg)   Height: 5' 2\" (1.575 m)     Body mass index is 33.47 kg/m². Patient's last menstrual period was 2022 (approximate). Mother's ethnicity:    Father's ethnicity:      She is complaining today of:   Pain: No  Cramping: Yes intermittently  Bleeding or spotting: Yes just when wiping varies from pink to dark brown     Nausea: No  Vomiting: No    Breast enlargement/tenderness: Yes  Fatigue: Yes  Frequency of urination: Yes    Previous high risk obstetrical history: yes GHTN  OB History    Para Term  AB Living   3 3 3 0 0 3   SAB IAB Ectopic Molar Multiple Live Births   0 0 0   0 3      # Outcome Date GA Lbr [place]/2nd Weight Sex Delivery Anes PTL Lv   3 Term 21 40w1d / 00:01 6 lb 12.1 oz (3.065 kg) F Vag-Spont None N JUANA   2 Term 16 40w0d  6 lb 14 oz (3.118 kg) M Vag-Spont   JUANA   1 Term 14 40w0d  7 lb (3.175 kg) F Vag-Spont EPI N JUANA       ROS was done and is negative except as documented in HPI. History was reviewed as documented on Epic Navigator. ASSESSMENT:  Missed menses with + UCG  1. Missed menses    2. Positive pregnancy test    3. Spotting in pregnancy    4. Pregnancy with inconclusive fetal viability, single or unspecified fetus    5.  Acute vaginitis    old blood in vaginal vault on spec exam    PLAN:  UCG was done and noted as positive  Prenatal vitamins were ordered: Yes  Ultrasound for dating and viability was ordered: Yes  1 hour glucola was ordered: No  She was instructed to call with any concerns or worsening of any symptoms  She will return for New OB intake visit  Discussed with patient increasing water intake, foods to limit or avoid, activity level. Zika precautions reviewed  Patient instructed on     Patient was seen with total face to face time of 30 minutes. More than 50% of this visit was spent face to face coordinating plan of care and answering questions . She was also counseled on her preventative health maintenance recommendations and follow-up. Return for call in am for quant level if 5000 or above will schedule dating US.     Electronically signed by: Anabelle Nieto CNP

## 2022-06-23 ENCOUNTER — TELEPHONE (OUTPATIENT)
Dept: OBGYN CLINIC | Age: 27
End: 2022-06-23

## 2022-06-23 DIAGNOSIS — N76.0 BV (BACTERIAL VAGINOSIS): Primary | ICD-10-CM

## 2022-06-23 DIAGNOSIS — N92.6 MISSED MENSES: Primary | ICD-10-CM

## 2022-06-23 DIAGNOSIS — B96.89 BV (BACTERIAL VAGINOSIS): Primary | ICD-10-CM

## 2022-06-23 DIAGNOSIS — O20.0 THREATENED MISCARRIAGE: ICD-10-CM

## 2022-06-23 LAB
CANDIDA SPECIES, DNA PROBE: NEGATIVE
GARDNERELLA VAGINALIS, DNA PROBE: POSITIVE
HCG QUANTITATIVE: 130 MIU/ML
SOURCE: ABNORMAL
TRICHOMONAS VAGINALIS DNA: NEGATIVE

## 2022-06-23 PROCEDURE — 36415 COLL VENOUS BLD VENIPUNCTURE: CPT | Performed by: CLINICAL NURSE SPECIALIST

## 2022-06-23 RX ORDER — FLUCONAZOLE 100 MG/1
100 TABLET ORAL DAILY
Qty: 7 TABLET | Refills: 0 | Status: SHIPPED | OUTPATIENT
Start: 2022-06-23 | End: 2022-06-30

## 2022-06-23 RX ORDER — METRONIDAZOLE 500 MG/1
500 TABLET ORAL 2 TIMES DAILY
Qty: 14 TABLET | Refills: 0 | Status: SHIPPED | OUTPATIENT
Start: 2022-06-23 | End: 2022-06-30

## 2022-06-23 NOTE — TELEPHONE ENCOUNTER
----- Message from MACK Pinto CNP sent at 6/23/2022  8:31 AM EDT -----  Patient is either not as far along as she thinks or possible miscarry, she will need a quant tomorrow ,order was placed.

## 2022-06-25 ENCOUNTER — HOSPITAL ENCOUNTER (OUTPATIENT)
Age: 27
Discharge: HOME | End: 2022-06-25
Payer: MEDICARE

## 2022-06-25 DIAGNOSIS — N92.6 MISSED MENSES: ICD-10-CM

## 2022-06-25 DIAGNOSIS — O03.4 INCOMPLETE MISCARRIAGE: Primary | ICD-10-CM

## 2022-06-25 DIAGNOSIS — O20.0 THREATENED MISCARRIAGE: ICD-10-CM

## 2022-06-25 LAB — HCG QUANTITATIVE: 66 MIU/ML

## 2022-06-25 PROCEDURE — 36415 COLL VENOUS BLD VENIPUNCTURE: CPT

## 2022-06-25 PROCEDURE — 84702 CHORIONIC GONADOTROPIN TEST: CPT

## 2022-06-25 PROCEDURE — 36415 COLL VENOUS BLD VENIPUNCTURE: CPT | Performed by: CLINICAL NURSE SPECIALIST

## 2022-06-27 ENCOUNTER — TELEPHONE (OUTPATIENT)
Dept: OBGYN CLINIC | Age: 27
End: 2022-06-27

## 2022-07-11 ENCOUNTER — HOSPITAL ENCOUNTER (OUTPATIENT)
Age: 27
Setting detail: SPECIMEN
Discharge: HOME | End: 2022-07-11

## 2022-07-11 ENCOUNTER — NURSE ONLY (OUTPATIENT)
Dept: OBGYN CLINIC | Age: 27
End: 2022-07-11
Payer: MEDICARE

## 2022-07-11 DIAGNOSIS — O03.4 INCOMPLETE MISCARRIAGE: ICD-10-CM

## 2022-07-11 DIAGNOSIS — O03.4 INCOMPLETE MISCARRIAGE: Primary | ICD-10-CM

## 2022-07-11 LAB — HCG QUANTITATIVE: <1 MIU/ML

## 2022-07-11 PROCEDURE — 36415 COLL VENOUS BLD VENIPUNCTURE: CPT | Performed by: CLINICAL NURSE SPECIALIST

## 2022-07-12 ENCOUNTER — TELEPHONE (OUTPATIENT)
Dept: OBGYN CLINIC | Age: 27
End: 2022-07-12

## 2022-07-12 NOTE — TELEPHONE ENCOUNTER
Patient notified of HCG results and pregnancy has resolved. All questions answered. Patient gave verbal understanding.

## 2022-07-12 NOTE — TELEPHONE ENCOUNTER
----- Message from MACK Calvo CNP sent at 7/11/2022  3:31 PM EDT -----  Pregnancy has resolved, no further lab draws necessary

## 2022-07-12 NOTE — TELEPHONE ENCOUNTER
[facility]. Patient's questions answered. Patient gave verbal understanding.

## 2022-11-11 ENCOUNTER — APPOINTMENT (OUTPATIENT)
Dept: ULTRASOUND IMAGING | Age: 27
End: 2022-11-11
Payer: MEDICARE

## 2022-11-11 ENCOUNTER — HOSPITAL ENCOUNTER (EMERGENCY)
Age: 27
Discharge: HOME | End: 2022-11-11
Attending: EMERGENCY MEDICINE
Payer: MEDICARE

## 2022-11-11 VITALS
BODY MASS INDEX: 33.13 KG/M2 | OXYGEN SATURATION: 97 % | TEMPERATURE: 98.8 F | WEIGHT: 180 LBS | HEART RATE: 107 BPM | RESPIRATION RATE: 18 BRPM | DIASTOLIC BLOOD PRESSURE: 73 MMHG | HEIGHT: 62 IN | SYSTOLIC BLOOD PRESSURE: 132 MMHG

## 2022-11-11 DIAGNOSIS — N93.9 VAGINAL BLEEDING: Primary | ICD-10-CM

## 2022-11-11 DIAGNOSIS — N92.6 MISSED MENSES: ICD-10-CM

## 2022-11-11 LAB
ABSOLUTE EOS #: 0.2 K/UL (ref 0–0.4)
ABSOLUTE LYMPH #: 3.2 K/UL (ref 1–4.8)
ABSOLUTE MONO #: 0.6 K/UL (ref 0.1–1.3)
ALBUMIN SERPL-MCNC: 4.5 G/DL (ref 3.5–5.2)
ALP BLD-CCNC: 87 U/L (ref 35–104)
ALT SERPL-CCNC: 18 U/L (ref 5–33)
ANION GAP SERPL CALCULATED.3IONS-SCNC: 11 MMOL/L (ref 9–17)
AST SERPL-CCNC: 20 U/L
BACTERIA: NORMAL
BASOPHILS # BLD: 1 % (ref 0–2)
BASOPHILS ABSOLUTE: 0.1 K/UL (ref 0–0.2)
BILIRUB SERPL-MCNC: 0.3 MG/DL (ref 0.3–1.2)
BILIRUBIN URINE: NEGATIVE
BUN BLDV-MCNC: 7 MG/DL (ref 6–20)
CALCIUM SERPL-MCNC: 9.4 MG/DL (ref 8.6–10.4)
CASTS UA: NORMAL /LPF
CHLORIDE BLD-SCNC: 103 MMOL/L (ref 98–107)
CO2: 25 MMOL/L (ref 20–31)
COLOR: ABNORMAL
CREAT SERPL-MCNC: 0.52 MG/DL (ref 0.5–0.9)
EOSINOPHILS RELATIVE PERCENT: 1 % (ref 0–4)
EPITHELIAL CELLS UA: NORMAL /HPF
GFR SERPL CREATININE-BSD FRML MDRD: >60 ML/MIN/1.73M2
GLUCOSE BLD-MCNC: 116 MG/DL (ref 70–99)
GLUCOSE URINE: NEGATIVE
HCG QUANTITATIVE: 7346 MIU/ML
HCG(URINE) PREGNANCY TEST: POSITIVE
HCT VFR BLD CALC: 42.4 % (ref 36–46)
HEMOGLOBIN: 15 G/DL (ref 12–16)
INR BLD: 0.9
KETONES, URINE: NEGATIVE
LEUKOCYTE ESTERASE, URINE: ABNORMAL
LYMPHOCYTES # BLD: 25 % (ref 24–44)
MCH RBC QN AUTO: 32.2 PG (ref 26–34)
MCHC RBC AUTO-ENTMCNC: 35.3 G/DL (ref 31–37)
MCV RBC AUTO: 91.2 FL (ref 80–100)
MONOCYTES # BLD: 5 % (ref 1–7)
NITRITE, URINE: NEGATIVE
PDW BLD-RTO: 13.9 % (ref 11.5–14.9)
PH UA: 6.5 (ref 5–8)
PLATELET # BLD: 436 K/UL (ref 150–450)
PMV BLD AUTO: 6.7 FL (ref 6–12)
POTASSIUM SERPL-SCNC: 4.2 MMOL/L (ref 3.7–5.3)
PROTEIN UA: NEGATIVE
PROTHROMBIN TIME: 12.3 SEC (ref 11.8–14.6)
RBC # BLD: 4.65 M/UL (ref 4–5.2)
RBC UA: NORMAL /HPF
SEG NEUTROPHILS: 68 % (ref 36–66)
SEGMENTED NEUTROPHILS ABSOLUTE COUNT: 8.8 K/UL (ref 1.3–9.1)
SODIUM BLD-SCNC: 139 MMOL/L (ref 135–144)
SPECIFIC GRAVITY UA: 1 (ref 1–1.03)
TOTAL PROTEIN: 7.4 G/DL (ref 6.4–8.3)
TURBIDITY: CLEAR
URINE HGB: ABNORMAL
UROBILINOGEN, URINE: NORMAL
WBC # BLD: 12.9 K/UL (ref 3.5–11)
WBC UA: NORMAL /HPF

## 2022-11-11 PROCEDURE — 76817 TRANSVAGINAL US OBSTETRIC: CPT

## 2022-11-11 PROCEDURE — 81025 URINE PREGNANCY TEST: CPT

## 2022-11-11 PROCEDURE — 80053 COMPREHEN METABOLIC PANEL: CPT

## 2022-11-11 PROCEDURE — 36415 COLL VENOUS BLD VENIPUNCTURE: CPT

## 2022-11-11 PROCEDURE — 99284 EMERGENCY DEPT VISIT MOD MDM: CPT

## 2022-11-11 PROCEDURE — 85025 COMPLETE CBC W/AUTO DIFF WBC: CPT

## 2022-11-11 PROCEDURE — 85610 PROTHROMBIN TIME: CPT

## 2022-11-11 PROCEDURE — 81001 URINALYSIS AUTO W/SCOPE: CPT

## 2022-11-11 PROCEDURE — 84702 CHORIONIC GONADOTROPIN TEST: CPT

## 2022-11-11 RX ORDER — VITAMIN A ACETATE, .BETA.-CAROTENE, ASCORBIC ACID, CHOLECALCIFEROL, .ALPHA.-TOCOPHEROL ACETATE, DL-, THIAMINE MONONITRATE, RIBOFLAVIN, NIACINAMIDE, PYRIDOXINE HYDROCHLORIDE, FOLIC ACID, CYANOCOBALAMIN, CALCIUM CARBONATE, FERROUS FUMARATE, ZINC OXIDE, AND CUPRIC OXIDE 2000; 2000; 120; 400; 22; 1.84; 3; 20; 10; 1; 12; 200; 27; 25; 2 [IU]/1; [IU]/1; MG/1; [IU]/1; MG/1; MG/1; MG/1; MG/1; MG/1; MG/1; UG/1; MG/1; MG/1; MG/1; MG/1
1 TABLET ORAL DAILY
Qty: 30 TABLET | Refills: 0 | Status: SHIPPED | OUTPATIENT
Start: 2022-11-11 | End: 2022-11-14

## 2022-11-11 ASSESSMENT — ENCOUNTER SYMPTOMS
RHINORRHEA: 0
SORE THROAT: 0
NAUSEA: 0
DIARRHEA: 0
VOMITING: 0
COUGH: 0
SHORTNESS OF BREATH: 0
ABDOMINAL PAIN: 0

## 2022-11-11 NOTE — ED TRIAGE NOTES
Mode of arrival (squad #, walk in, police, etc) : walk in        Chief complaint(s): Vaginal bleeding        Arrival Note (brief scenario, treatment PTA, etc). : Pt states she has had spotting for approx 3 days, today she has had increased blood and clotting. Pt states she is approx 11 weeks gestation. C= \"Have you ever felt that you should Cut down on your drinking? \"  No  A= \"Have people Annoyed you by criticizing your drinking? \"  No  G= \"Have you ever felt bad or Guilty about your drinking? \"  No  E= \"Have you ever had a drink as an Eye-opener first thing in the morning to steady your nerves or to help a hangover? \"  No      Deferred []      Reason for deferring: N/A    *If yes to two or more: probable alcohol abuse. *

## 2022-11-11 NOTE — ED PROVIDER NOTES
[address]  eMERGENCY dEPARTMENT eNCOUnter   Attending Attestation     Pt Name: Reuben Chauhan  MRN: [id number]  [place] 1995  Date of evaluation: 22    History, EXAM, MDM:    Reuben Chauhan is a 32 y.o. female who presents with Vaginal Bleeding    Vaginal spotting. Recent intercourse. Reports + home pregnancy test.  LMP in August.  . No pelvic pain. Pt well appearing. No hypotension. Hemoglobin 15. Plt 436. Mesfin Mering Urinalysis shows no sign of a UTI. US does not show an IUP. Pt will need serial HCG and Ultrasound testing. Recommended close follow up with ob, return to ED if symptoms worsen, and warning precautions provided. Vitals:   Vitals:    22 1749   BP: 132/73   Pulse: (!) 119   Resp: 18   Temp: 98.8 °F (37.1 °C)   SpO2: 97%   Weight: 180 lb (81.6 kg)   Height: 5' 2\" (1.575 m)     I performed a history and physical examination of the patient and discussed management with the resident. I reviewed the residents note and agree with the documented findings and plan of care. Any areas of disagreement are noted on the chart. I was personally present for the key portions of any procedures. I have documented in the chart those procedures where I was not present during the key portions. I have personally reviewed all images and agree with the resident's interpretation. I have reviewed the emergency nurses triage note. I agree with the chief complaint, past medical history, past surgical history, allergies, medications, social and family history as documented unless otherwise noted below. Documentation of the HPI, Physical Exam and Medical Decision Making performed by medical students or scribes is based on my personal performance of the HPI, PE and MDM. For Phys Assistant/ Nurse Practitioner cases/documentation I have had a face to face evaluation of this patient and have completed at least one if not all key elements of the E/M (history, physical exam, and MDM). Additional findings are as noted.     Magy Cabrera MD  Attending Emergency  Physician                Magy Cabrera MD  11/11/22 6192

## 2022-11-11 NOTE — ED PROVIDER NOTES
[address]  Emergency Department Encounter  EmergencyMedicine Resident     Pt Brenda Kidd  MRN: [id number]  [place] 1995  Date of evaluation: 11/11/22  PCP:  Anibal Cox DO      CHIEF COMPLAINT       Chief Complaint   Patient presents with    Vaginal Bleeding       HISTORY OF PRESENT ILLNESS  (Location/Symptom, Timing/Onset, Context/Setting, Quality, Duration, Modifying Factors, Severity.)      Sahra Yoder is a 32 y.o. female who presents with vaginal spotting and passing clots. Patient states her last menstrual period was August 26. She took a pregnancy test at home on 10/2 that she reports was positive. She has not followed up with her OB yet for confirmatory testing but does have an OB appointment scheduled on Monday. She is G4, P1 with history of 3 spontaneous miscarriages prior to 5 weeks. She states she began having light vaginal spotting after intercourse 3 days ago, and yesterday passed 2 small clots. She has since had some light vaginal spotting. No michael vaginal bleeding. She denies any abdominal pain but states she is having a \"fluttering\" in her belly. She denies headache, changes vision, chest pain, shortness of breath, abdominal pain or cramping, vaginal discharge, michael vaginal bleeding or soaking through menstrual pads, lightheadedness, dizziness, nausea, vomiting, diarrhea, fevers, chills. PAST MEDICAL / SURGICAL / SOCIAL / FAMILY HISTORY      has no past medical history on file. Hx spontaneous abortions x 3     has a past surgical history that includes Tonsillectomy and Cystocopy.       Social History     Socioeconomic History    Marital status: Single     Spouse name: Not on file    Number of children: Not on file    Years of education: Not on file    Highest education level: Not on file   Occupational History    Not on file   Tobacco Use    Smoking status: Every Day     Packs/day: 0.50     Types: Cigarettes    Smokeless tobacco: Never   Vaping Use    Vaping Use: Never used   Substance and Sexual Activity    Alcohol use: No    Drug use: No    Sexual activity: Yes     Partners: Male   Other Topics Concern    Not on file   Social History Narrative    Not on file     Social Determinants of Health     Financial Resource Strain: Low Risk     Difficulty of Paying Living Expenses: Not hard at all   Food Insecurity: No Food Insecurity    Worried About Running Out of Food in the Last Year: Never true    [address] in the Last Year: Never true   Transportation Needs: No Transportation Needs    Lack of Transportation (Medical): No    Lack of Transportation (Non-Medical): No   Physical Activity: Not on file   Stress: Not on file   Social Connections: Not on file   Intimate Partner Violence: Not on file   Housing Stability: Not on file       Family History   Problem Relation Age of Onset    Heart Disease Father     Asthma Brother        Allergies:  Patient has no known allergies. Home Medications:  Prior to Admission medications    Medication Sig Start Date End Date Taking? Authorizing Provider   Prenatal Vit-Fe Fumarate-FA (PNV PRENATAL PLUS MULTIVITAMIN) 27-1 MG TABS Take 1 tablet by mouth daily 11/11/22 12/11/22 Yes Genny Mendoza MD   chlorhexidine (PERIDEX) 0.12 % solution SWITH AND SPIT 10 MLS 2 TIMES PER DAY, START DAY AFTER SURGERY 8/31/21   Historical Provider, MD   acetaminophen (TYLENOL) 500 MG tablet Take 2 tablets by mouth 3 times daily 5/25/21   Nicole Sampson DO   ibuprofen (ADVIL;MOTRIN) 600 MG tablet Take 1 tablet by mouth every 6 hours as needed for Pain  Patient not taking: Reported on 6/22/2022 5/25/21   Nicole Sampson DO       REVIEW OF SYSTEMS    (2-9 systems for level 4, 10 or more for level 5)      Review of Systems   Constitutional:  Negative for chills, fatigue and fever. HENT:  Negative for congestion, rhinorrhea and sore throat. Eyes:  Negative for visual disturbance.    Respiratory:  Negative for cough and shortness of breath. Cardiovascular:  Negative for chest pain. Gastrointestinal:  Negative for abdominal pain, diarrhea, nausea and vomiting. Genitourinary:  Positive for vaginal bleeding. Negative for dysuria, hematuria and vaginal discharge. Musculoskeletal:  Negative for arthralgias and myalgias. Skin:  Negative for rash. Neurological:  Negative for dizziness, tremors, syncope, weakness, light-headedness, numbness and headaches. All other systems reviewed and are negative. PHYSICAL EXAM   (up to 7 for level 4, 8 or more for level 5)      INITIAL VITALS:   /73   Pulse (!) 107   Temp 98.8 °F (37.1 °C)   Resp 18   Ht 5' 2\" (1.575 m)   Wt 180 lb (81.6 kg)   LMP 08/28/2022   SpO2 97%   BMI 32.92 kg/m²     Physical Exam  Vitals reviewed. Constitutional:       General: She is not in acute distress. Appearance: She is not toxic-appearing. HENT:      Head: Normocephalic. Mouth/Throat:      Mouth: Mucous membranes are moist.      Pharynx: Oropharynx is clear. Eyes:      Extraocular Movements: Extraocular movements intact. Pupils: Pupils are equal, round, and reactive to light. Cardiovascular:      Rate and Rhythm: Normal rate and regular rhythm. Pulses: Normal pulses. Heart sounds: Normal heart sounds. Pulmonary:      Effort: Pulmonary effort is normal.      Breath sounds: Normal breath sounds. No decreased breath sounds, wheezing, rhonchi or rales. Abdominal:      Palpations: Abdomen is soft. Tenderness: There is no abdominal tenderness. There is no guarding or rebound. Musculoskeletal:         General: Normal range of motion. Cervical back: Normal range of motion and neck supple. Right lower leg: No edema. Left lower leg: No edema. Skin:     Capillary Refill: Capillary refill takes less than 2 seconds. Coloration: Skin is not pale. Findings: No rash. Neurological:      General: No focal deficit present.       Mental Status: She is alert and oriented to person, place, and time. Motor: No weakness.        DIFFERENTIAL  DIAGNOSIS     PLAN (LABS / IMAGING / EKG):  Orders Placed This Encounter   Procedures    US OB TRANSVAGINAL    Urinalysis with Reflex to Culture    Pregnancy, Urine    CBC with Auto Differential    Protime-INR    Comprehensive Metabolic Panel    Microscopic Urinalysis    HCG, Quantitative, Pregnancy       MEDICATIONS ORDERED:  Orders Placed This Encounter   Medications    Prenatal Vit-Fe Fumarate-FA (PNV PRENATAL PLUS MULTIVITAMIN) 27-1 MG TABS     Sig: Take 1 tablet by mouth daily     Dispense:  30 tablet     Refill:  0       DDX: Threatened miscarriage versus incomplete miscarriage versus intrauterine pregnancy versus ectopic pregnancy versus menstruation    DIAGNOSTIC RESULTS / EMERGENCY DEPARTMENT COURSE / MDM   LAB RESULTS:  Results for orders placed or performed during the hospital encounter of 11/11/22   Urinalysis with Reflex to Culture    Specimen: Urine, clean catch   Result Value Ref Range    Color, UA Orange (A) Yellow    Turbidity UA Clear Clear    Glucose, Ur NEGATIVE NEGATIVE    Bilirubin Urine NEGATIVE NEGATIVE    Ketones, Urine NEGATIVE NEGATIVE    Specific Gravity, UA 1.004 1.000 - 1.030    Urine Hgb LARGE (A) NEGATIVE    pH, UA 6.5 5.0 - 8.0    Protein, UA NEGATIVE NEGATIVE    Urobilinogen, Urine Normal Normal    Nitrite, Urine NEGATIVE NEGATIVE    Leukocyte Esterase, Urine TRACE (A) NEGATIVE   Pregnancy, Urine   Result Value Ref Range    HCG(Urine) Pregnancy Test POSITIVE (A) NEGATIVE   CBC with Auto Differential   Result Value Ref Range    WBC 12.9 (H) 3.5 - 11.0 k/uL    RBC 4.65 4.0 - 5.2 m/uL    Hemoglobin 15.0 12.0 - 16.0 g/dL    Hematocrit 42.4 36 - 46 %    MCV 91.2 80 - 100 fL    MCH 32.2 26 - 34 pg    MCHC 35.3 31 - 37 g/dL    RDW 13.9 11.5 - 14.9 %    Platelets 469 080 - 059 k/uL    MPV 6.7 6.0 - 12.0 fL    Seg Neutrophils 68 (H) 36 - 66 %    Lymphocytes 25 24 - 44 %    Monocytes 5 1 - 7 %    Eosinophils % 1 0 - 4 %    Basophils 1 0 - 2 %    Segs Absolute 8.80 1.3 - 9.1 k/uL    Absolute Lymph # 3.20 1.0 - 4.8 k/uL    Absolute Mono # 0.60 0.1 - 1.3 k/uL    Absolute Eos # 0.20 0.0 - 0.4 k/uL    Basophils Absolute 0.10 0.0 - 0.2 k/uL   Protime-INR   Result Value Ref Range    Protime 12.3 11.8 - 14.6 sec    INR 0.9    Comprehensive Metabolic Panel   Result Value Ref Range    Glucose 116 (H) 70 - 99 mg/dL    BUN 7 6 - 20 mg/dL    Creatinine 0.52 0.50 - 0.90 mg/dL    Est, Glom Filt Rate >60 >60 mL/min/1.73m2    Calcium 9.4 8.6 - 10.4 mg/dL    Sodium 139 135 - 144 mmol/L    Potassium 4.2 3.7 - 5.3 mmol/L    Chloride 103 98 - 107 mmol/L    CO2 25 20 - 31 mmol/L    Anion Gap 11 9 - 17 mmol/L    Alkaline Phosphatase 87 35 - 104 U/L    ALT 18 5 - 33 U/L    AST 20 <32 U/L    Total Bilirubin 0.3 0.3 - 1.2 mg/dL    Total Protein 7.4 6.4 - 8.3 g/dL    Albumin 4.5 3.5 - 5.2 g/dL   Microscopic Urinalysis   Result Value Ref Range    WBC, UA 0 TO 2 /HPF    RBC, UA 21 TO 50 /HPF    Casts UA 0 TO 2 /LPF    Epithelial Cells UA 0 TO 2 /HPF    Bacteria, UA None None   HCG, Quantitative, Pregnancy   Result Value Ref Range    hCG Quant 7,346 (H) <5 mIU/mL       IMPRESSION: 31 YO female presents with home positive pregnancy test on 10/2, last menstrual period 8/26. Patient experiencing light vaginal spotting as well as passing 2 small clots over the past 3 days after intercourse. Patient is G4, P1 with history of 3 abortions prior to 5 weeks. Has not followed up with her OB yet. On examination, patient is overall well-appearing back. Patient is tachycardic in triage at 110. She denies any michael vaginal bleeding, is not soaking through any menstrual pads. On examination cardiac RRR. Lungs CTA B. Abdomen is soft and nontender in all quadrants. No peritoneal signs, no rigidity rebound or guarding. Patient is not pale. Will collect lab work-up as well as urine samples, and perform bedside ultrasound.     Patient will be discharged with strict return precautions as she needs repeat hCG and ultrasound in 48 hours and has a previously scheduled appointment with her OB on Monday. Discussed return precautions at length. Patient voiced understanding and agreement with plan. RADIOLOGY:  US OB TRANSVAGINAL    Result Date: 11/11/2022  EXAMINATION: FIRST TRIMESTER OBSTETRIC ULTRASOUND 11/11/2022 TECHNIQUE: Transvaginal first trimester obstetric pelvic duplex ultrasound was performed with real-time imaging, color flow Doppler imaging, and spectral analysis. COMPARISON: 09/09/2020, 06/03/2020 HISTORY: ORDERING SYSTEM PROVIDED HISTORY: Vaginal bleeding, positive hCG without identifiable IUP TECHNOLOGIST PROVIDED HISTORY: Vaginal bleeding, positive hCG without identifiable IUP FINDINGS: Uterus: 9.9 x 5.0 x 5.9 cm Gestational Sac(s):  There is a fluid collection within the endometrium measuring approximately 1.26 cm. There is an adjacent heterogeneous mixed echogenic area measuring 2.6 x 2.4 x 1.2 cm suspicious for a subchorionic hemorrhage. Yolk Sac:  Not visualized Fetal Pole:  Not visualized Right ovary: 2.6 x 1.7 x 1.9 cm. Left ovary: 2.4 x 1.6 x 1.5 cm. Free fluid: No significant free fluid. 1. 1.26 cm fluid collection within the endometrium which could reflect an early gestational sac. However, no fetal pole or yolk sac are identified. If this does reflect an early gestational sac this would correlate to an estimated gestational age of 7 weeks, 0 days. A failed 1st trimester pregnancy or pseudo gestational sac in the setting of ectopic pregnancy cannot be excluded. Recommend continued close clinical follow-up and correlation with serial beta HCGs. Consider a short-term follow-up ultrasound in 7-10 days to assess for viability. 2. 2.6 cm adjacent area mixed echogenicity suspicious for subchorionic hemorrhage. 3. Unremarkable bilateral ovaries.       EMERGENCY DEPARTMENT COURSE:  ED Course as of 11/12/22 0046   Fri Nov 11, 2022   1832 HCG(Urine) Pregnancy Test(!): POSITIVE [JG]   1852 TVUS ordered to rule out ectopic and for confirmatory IUP. Bedside ultrasound showing contents within uterus but no identifiable fetal parts or heart rate [JG]      ED Course User Index  [JG] Gustavo Madrid MD     CONSULTS:  None    FINAL IMPRESSION      1. Vaginal bleeding    2.  Missed menses          DISPOSITION / PLAN     DISPOSITION Decision To Discharge 11/11/2022 09:23:22 PM      PATIENT REFERRED TO:  [facility]  [address]  [facility] [address]  [phone]  Go on 11/14/2022  Go to previously scheduled appointment on Monday    [facility] [address]  [phone]  Go to   If symptoms worsen, if bleeding increases or soaking through pad in 30 min-1 hr, if abdominal pain or cramping    DISCHARGE MEDICATIONS:  Discharge Medication List as of 11/11/2022  9:30 PM          Gustavo Madrid MD  Emergency Medicine Resident    (Please note that portions of thisnote were completed with a voice recognition program.  Efforts were made to edit the dictations but occasionally words are mis-transcribed.)       Gustavo Madrid MD  Resident  11/12/22 9484

## 2022-11-11 NOTE — ED NOTES
Pt came to ED d/t vaginal bleeding that began three nights ago, \"it got worse today\". Pt states only spotting today.      Bill Méndez RN  11/11/22 84 Carlotta Sanchez RN  11/11/22 1376

## 2022-11-12 ENCOUNTER — HOSPITAL ENCOUNTER (OUTPATIENT)
Age: 27
Setting detail: OBSERVATION
Discharge: HOME | End: 2022-11-13
Attending: EMERGENCY MEDICINE | Admitting: OBSTETRICS & GYNECOLOGY
Payer: MEDICARE

## 2022-11-12 DIAGNOSIS — O03.9 MISCARRIAGE: Primary | ICD-10-CM

## 2022-11-12 PROBLEM — N76.0 ACUTE VAGINITIS: Status: RESOLVED | Noted: 2021-04-12 | Resolved: 2022-11-12

## 2022-11-12 PROBLEM — Z3A.00 WEEKS OF GESTATION OF PREGNANCY NOT SPECIFIED: Status: RESOLVED | Noted: 2020-07-28 | Resolved: 2022-11-12

## 2022-11-12 PROBLEM — O03.4 INCOMPLETE ABORTION: Status: ACTIVE | Noted: 2022-11-12

## 2022-11-12 PROBLEM — Z3A.40 40 WEEKS GESTATION OF PREGNANCY: Status: RESOLVED | Noted: 2021-01-18 | Resolved: 2022-11-12

## 2022-11-12 LAB
ABO/RH: NORMAL
ABSOLUTE EOS #: 0.21 K/UL (ref 0–0.44)
ABSOLUTE IMMATURE GRANULOCYTE: 0.06 K/UL (ref 0–0.3)
ABSOLUTE LYMPH #: 4.35 K/UL (ref 1.1–3.7)
ABSOLUTE MONO #: 0.67 K/UL (ref 0.1–1.2)
ANION GAP SERPL CALCULATED.3IONS-SCNC: 13 MMOL/L (ref 9–17)
ANTIBODY SCREEN: NEGATIVE
ARM BAND NUMBER: NORMAL
BASOPHILS # BLD: 0 % (ref 0–2)
BASOPHILS ABSOLUTE: 0.06 K/UL (ref 0–0.2)
BUN BLDV-MCNC: 8 MG/DL (ref 6–20)
CALCIUM SERPL-MCNC: 9 MG/DL (ref 8.6–10.4)
CANDIDA SPECIES, DNA PROBE: NEGATIVE
CHLORIDE BLD-SCNC: 103 MMOL/L (ref 98–107)
CO2: 20 MMOL/L (ref 20–31)
CREAT SERPL-MCNC: 0.47 MG/DL (ref 0.5–0.9)
EOSINOPHILS RELATIVE PERCENT: 2 % (ref 1–4)
EXPIRATION DATE: NORMAL
GARDNERELLA VAGINALIS, DNA PROBE: NEGATIVE
GFR SERPL CREATININE-BSD FRML MDRD: >60 ML/MIN/1.73M2
GLUCOSE BLD-MCNC: 167 MG/DL (ref 70–99)
HCG QUANTITATIVE: 5032 MIU/ML
HCT VFR BLD CALC: 41.6 % (ref 36.3–47.1)
HEMOGLOBIN: 13.7 G/DL (ref 11.9–15.1)
IMMATURE GRANULOCYTES: 0 %
LYMPHOCYTES # BLD: 32 % (ref 24–43)
MCH RBC QN AUTO: 30.3 PG (ref 25.2–33.5)
MCHC RBC AUTO-ENTMCNC: 32.9 G/DL (ref 28.4–34.8)
MCV RBC AUTO: 92 FL (ref 82.6–102.9)
MONOCYTES # BLD: 5 % (ref 3–12)
NRBC AUTOMATED: 0 PER 100 WBC
PDW BLD-RTO: 13.3 % (ref 11.8–14.4)
PLATELET # BLD: 422 K/UL (ref 138–453)
PMV BLD AUTO: 9 FL (ref 8.1–13.5)
POTASSIUM SERPL-SCNC: 4.1 MMOL/L (ref 3.7–5.3)
RBC # BLD: 4.52 M/UL (ref 3.95–5.11)
SARS-COV-2, RAPID: NOT DETECTED
SEG NEUTROPHILS: 61 % (ref 36–65)
SEGMENTED NEUTROPHILS ABSOLUTE COUNT: 8.46 K/UL (ref 1.5–8.1)
SODIUM BLD-SCNC: 136 MMOL/L (ref 135–144)
SOURCE: NORMAL
SPECIMEN DESCRIPTION: NORMAL
TRICHOMONAS VAGINALIS DNA: NEGATIVE
WBC # BLD: 13.8 K/UL (ref 3.5–11.3)

## 2022-11-12 PROCEDURE — 87491 CHLMYD TRACH DNA AMP PROBE: CPT

## 2022-11-12 PROCEDURE — 87635 SARS-COV-2 COVID-19 AMP PRB: CPT

## 2022-11-12 PROCEDURE — 86900 BLOOD TYPING SEROLOGIC ABO: CPT

## 2022-11-12 PROCEDURE — 87660 TRICHOMONAS VAGIN DIR PROBE: CPT

## 2022-11-12 PROCEDURE — 80048 BASIC METABOLIC PNL TOTAL CA: CPT

## 2022-11-12 PROCEDURE — 84702 CHORIONIC GONADOTROPIN TEST: CPT

## 2022-11-12 PROCEDURE — G0378 HOSPITAL OBSERVATION PER HR: HCPCS

## 2022-11-12 PROCEDURE — 85025 COMPLETE CBC W/AUTO DIFF WBC: CPT

## 2022-11-12 PROCEDURE — 86850 RBC ANTIBODY SCREEN: CPT

## 2022-11-12 PROCEDURE — 96374 THER/PROPH/DIAG INJ IV PUSH: CPT

## 2022-11-12 PROCEDURE — 6360000002 HC RX W HCPCS: Performed by: STUDENT IN AN ORGANIZED HEALTH CARE EDUCATION/TRAINING PROGRAM

## 2022-11-12 PROCEDURE — 86901 BLOOD TYPING SEROLOGIC RH(D): CPT

## 2022-11-12 PROCEDURE — 87480 CANDIDA DNA DIR PROBE: CPT

## 2022-11-12 PROCEDURE — 87510 GARDNER VAG DNA DIR PROBE: CPT

## 2022-11-12 PROCEDURE — 87591 N.GONORRHOEAE DNA AMP PROB: CPT

## 2022-11-12 PROCEDURE — 99285 EMERGENCY DEPT VISIT HI MDM: CPT

## 2022-11-12 RX ORDER — FENTANYL CITRATE 50 UG/ML
50 INJECTION, SOLUTION INTRAMUSCULAR; INTRAVENOUS ONCE
Status: COMPLETED | OUTPATIENT
Start: 2022-11-12 | End: 2022-11-12

## 2022-11-12 RX ADMIN — FENTANYL CITRATE 50 MCG: 50 INJECTION INTRAMUSCULAR; INTRAVENOUS at 20:28

## 2022-11-12 ASSESSMENT — ENCOUNTER SYMPTOMS
RHINORRHEA: 0
ABDOMINAL PAIN: 1
SHORTNESS OF BREATH: 0
VOMITING: 0
NAUSEA: 0
DIARRHEA: 0
BACK PAIN: 0
COUGH: 0

## 2022-11-12 ASSESSMENT — PAIN - FUNCTIONAL ASSESSMENT: PAIN_FUNCTIONAL_ASSESSMENT: 0-10

## 2022-11-12 NOTE — DISCHARGE INSTRUCTIONS
We discussed the results of your testing here - we cannot rule out threatened miscarriage in your case. Please attend your previously scheduled appointment on Monday with your OB/Gyn so they may repeat your hCG and ultrasound then. For pain use acetaminophen (Tylenol) as directed. PLEASE RETURN TO THE EMERGENCY DEPARTMENT IMMEDIATELY for worsening symptoms, severe vaginal bleeding, using more than (4) pads per hour, feeling of weakness, light-headed / dizzy, or if you develop any concerning symptoms such as: high fever not relieved by acetaminophen (Tylenol) and/or ibuprofen (Motrin / Advil), chills, shortness of breath, chest pain, feeling of your heart fluttering or racing, persistent nausea and/or vomiting, vomiting up blood, blood in your stool, loss of consciousness, numbness, weakness or tingling in the arms or legs or change in color of the extremities, changes in mental status, persistent headache, blurry vision loss of bladder / bowel control. Return within 8 - 12 hours if you have any of the following: worsening of pain in your abdomen, you continue to vomit, pain goes to your back, have pain in the abdomen when going over a bump in the car or when you jump up and down, unable to follow up with your physician, or other any other care or concern.

## 2022-11-13 ENCOUNTER — ANESTHESIA EVENT (OUTPATIENT)
Dept: OPERATING ROOM | Age: 27
End: 2022-11-13
Payer: MEDICARE

## 2022-11-13 ENCOUNTER — ANESTHESIA (OUTPATIENT)
Dept: OPERATING ROOM | Age: 27
End: 2022-11-13
Payer: MEDICARE

## 2022-11-13 ENCOUNTER — APPOINTMENT (OUTPATIENT)
Dept: ULTRASOUND IMAGING | Age: 27
End: 2022-11-13
Payer: MEDICARE

## 2022-11-13 VITALS
BODY MASS INDEX: 36.51 KG/M2 | RESPIRATION RATE: 18 BRPM | OXYGEN SATURATION: 97 % | WEIGHT: 198.41 LBS | TEMPERATURE: 98.3 F | HEART RATE: 85 BPM | SYSTOLIC BLOOD PRESSURE: 113 MMHG | HEIGHT: 62 IN | DIASTOLIC BLOOD PRESSURE: 64 MMHG

## 2022-11-13 LAB
HCT VFR BLD CALC: 36.3 % (ref 36.3–47.1)
HEMOGLOBIN: 12.2 G/DL (ref 11.9–15.1)
MCH RBC QN AUTO: 31.3 PG (ref 25.2–33.5)
MCHC RBC AUTO-ENTMCNC: 33.6 G/DL (ref 28.4–34.8)
MCV RBC AUTO: 93.1 FL (ref 82.6–102.9)
NRBC AUTOMATED: 0 PER 100 WBC
PDW BLD-RTO: 13.2 % (ref 11.8–14.4)
PLATELET # BLD: 397 K/UL (ref 138–453)
PMV BLD AUTO: 9 FL (ref 8.1–13.5)
RBC # BLD: 3.9 M/UL (ref 3.95–5.11)
WBC # BLD: 15.8 K/UL (ref 3.5–11.3)

## 2022-11-13 PROCEDURE — 96376 TX/PRO/DX INJ SAME DRUG ADON: CPT

## 2022-11-13 PROCEDURE — G0378 HOSPITAL OBSERVATION PER HR: HCPCS

## 2022-11-13 PROCEDURE — 85027 COMPLETE CBC AUTOMATED: CPT

## 2022-11-13 PROCEDURE — 76817 TRANSVAGINAL US OBSTETRIC: CPT

## 2022-11-13 PROCEDURE — 96375 TX/PRO/DX INJ NEW DRUG ADDON: CPT

## 2022-11-13 PROCEDURE — 6370000000 HC RX 637 (ALT 250 FOR IP): Performed by: STUDENT IN AN ORGANIZED HEALTH CARE EDUCATION/TRAINING PROGRAM

## 2022-11-13 PROCEDURE — 6360000002 HC RX W HCPCS: Performed by: STUDENT IN AN ORGANIZED HEALTH CARE EDUCATION/TRAINING PROGRAM

## 2022-11-13 PROCEDURE — 99219 PR INITIAL OBSERVATION CARE/DAY 50 MINUTES: CPT | Performed by: OBSTETRICS & GYNECOLOGY

## 2022-11-13 RX ORDER — ACETAMINOPHEN 500 MG
1000 TABLET ORAL EVERY 6 HOURS PRN
Status: DISCONTINUED | OUTPATIENT
Start: 2022-11-13 | End: 2022-11-13 | Stop reason: HOSPADM

## 2022-11-13 RX ORDER — SENNA AND DOCUSATE SODIUM 50; 8.6 MG/1; MG/1
1 TABLET, FILM COATED ORAL DAILY
Status: DISCONTINUED | OUTPATIENT
Start: 2022-11-13 | End: 2022-11-13 | Stop reason: HOSPADM

## 2022-11-13 RX ORDER — MAGNESIUM HYDROXIDE/ALUMINUM HYDROXICE/SIMETHICONE 120; 1200; 1200 MG/30ML; MG/30ML; MG/30ML
30 SUSPENSION ORAL EVERY 6 HOURS PRN
Status: DISCONTINUED | OUTPATIENT
Start: 2022-11-13 | End: 2022-11-13 | Stop reason: HOSPADM

## 2022-11-13 RX ORDER — VITAMIN A, ASCORBIC ACID, CHOLECALCIFEROL, .ALPHA.-TOCOPHEROL ACETATE, DL-, THIAMINE MONONITRATE, RIBOFLAVIN, NIACINAMIDE, PYRIDOXINE HYDROCHLORIDE, FOLIC ACID, CYANOCOBALAMIN, CALCIUM CARBONATE, IRON, ZINC OXIDE, AND CUPRIC OXIDE 4000; 120; 400; 22; 1.84; 3; 20; 10; 1; 12; 200; 29; 25; 2 [IU]/1; MG/1; [IU]/1; [IU]/1; MG/1; MG/1; MG/1; MG/1; MG/1; UG/1; MG/1; MG/1; MG/1; MG/1
1 TABLET ORAL DAILY
Status: DISCONTINUED | OUTPATIENT
Start: 2022-11-13 | End: 2022-11-13 | Stop reason: HOSPADM

## 2022-11-13 RX ORDER — MISOPROSTOL 100 UG/1
800 TABLET ORAL ONCE
Qty: 8 TABLET | Refills: 0 | Status: SHIPPED | OUTPATIENT
Start: 2022-11-13 | End: 2022-11-13

## 2022-11-13 RX ORDER — DOXYCYCLINE HYCLATE 100 MG
200 TABLET ORAL ONCE
Status: DISCONTINUED | OUTPATIENT
Start: 2022-11-13 | End: 2022-11-13 | Stop reason: HOSPADM

## 2022-11-13 RX ORDER — ONDANSETRON 2 MG/ML
4 INJECTION INTRAMUSCULAR; INTRAVENOUS EVERY 6 HOURS PRN
Status: DISCONTINUED | OUTPATIENT
Start: 2022-11-13 | End: 2022-11-13 | Stop reason: HOSPADM

## 2022-11-13 RX ORDER — ONDANSETRON 4 MG/1
4 TABLET, ORALLY DISINTEGRATING ORAL EVERY 8 HOURS PRN
Status: DISCONTINUED | OUTPATIENT
Start: 2022-11-13 | End: 2022-11-13 | Stop reason: HOSPADM

## 2022-11-13 RX ORDER — NICOTINE 21 MG/24HR
1 PATCH, TRANSDERMAL 24 HOURS TRANSDERMAL DAILY
Status: DISCONTINUED | OUTPATIENT
Start: 2022-11-13 | End: 2022-11-13 | Stop reason: HOSPADM

## 2022-11-13 RX ORDER — SIMETHICONE 80 MG
80 TABLET,CHEWABLE ORAL EVERY 6 HOURS PRN
Status: DISCONTINUED | OUTPATIENT
Start: 2022-11-13 | End: 2022-11-13 | Stop reason: HOSPADM

## 2022-11-13 RX ORDER — MISOPROSTOL 100 UG/1
800 TABLET ORAL ONCE
Status: COMPLETED | OUTPATIENT
Start: 2022-11-13 | End: 2022-11-13

## 2022-11-13 RX ORDER — KETOROLAC TROMETHAMINE 30 MG/ML
30 INJECTION, SOLUTION INTRAMUSCULAR; INTRAVENOUS EVERY 6 HOURS
Status: DISCONTINUED | OUTPATIENT
Start: 2022-11-13 | End: 2022-11-13 | Stop reason: HOSPADM

## 2022-11-13 RX ADMIN — KETOROLAC TROMETHAMINE 30 MG: 30 INJECTION, SOLUTION INTRAMUSCULAR; INTRAVENOUS at 09:54

## 2022-11-13 RX ADMIN — KETOROLAC TROMETHAMINE 30 MG: 30 INJECTION, SOLUTION INTRAMUSCULAR; INTRAVENOUS at 03:13

## 2022-11-13 RX ADMIN — ONDANSETRON 4 MG: 2 INJECTION INTRAMUSCULAR; INTRAVENOUS at 05:11

## 2022-11-13 RX ADMIN — MISOPROSTOL 800 MCG: 100 TABLET ORAL at 03:13

## 2022-11-13 ASSESSMENT — PAIN DESCRIPTION - DESCRIPTORS
DESCRIPTORS: CRAMPING;ACHING
DESCRIPTORS: CRAMPING

## 2022-11-13 ASSESSMENT — PAIN DESCRIPTION - PAIN TYPE: TYPE: ACUTE PAIN

## 2022-11-13 ASSESSMENT — PAIN DESCRIPTION - LOCATION
LOCATION: PELVIS
LOCATION: PELVIS;BACK

## 2022-11-13 ASSESSMENT — PAIN - FUNCTIONAL ASSESSMENT: PAIN_FUNCTIONAL_ASSESSMENT: PREVENTS OR INTERFERES SOME ACTIVE ACTIVITIES AND ADLS

## 2022-11-13 ASSESSMENT — PAIN DESCRIPTION - ORIENTATION: ORIENTATION: LOWER

## 2022-11-13 ASSESSMENT — PAIN DESCRIPTION - DIRECTION: RADIATING_TOWARDS: BACK

## 2022-11-13 ASSESSMENT — PAIN SCALES - GENERAL
PAINLEVEL_OUTOF10: 4
PAINLEVEL_OUTOF10: 7

## 2022-11-13 NOTE — DISCHARGE SUMMARY
Discharge teaching and instructions completed with patient using teachback method. AVS reviewed; education on misoprostol and incomplete miscarriage added to patient AVS. Prescriptions sent to patients preferred pharmacy. Patient voiced understanding regarding prescriptions, follow up appointments, and care of self at home. Discharged home with family. All questions answered.

## 2022-11-13 NOTE — ED NOTES
Report called to [facility], all questions answered     Val Kline RN  11/13/22 6882

## 2022-11-13 NOTE — DISCHARGE SUMMARY
Obstetric Discharge Summary  [address] [facility]    Patient Name: Sally Hubbard  Patient : 1995  Primary Care Physician: Sabine Amezcua DO  Admit Date: 2022    Principal Diagnosis: IUP at Unknown, admitted for inpatient management of incomplete AB    Her pregnancy has been complicated by:   Patient Active Problem List   Diagnosis    Tobacco smoking complicating pregnancy in third trimester    Cigarette smoker     21 F Apg 8/9 Wt 6#12    Hidradenitis suppurativa    Incomplete        Infection Present?: No  Hospital Acquired: N/A    Surgical Operations & Procedures:    Consultations: none    Pertinent Findings & Procedures:   Sally Hubbard is a 32 y.o. female [id number] at Unknown admitted for admitted for inpatient management of incomplete AB; received Cytotec 800 Buccal.      Course of patient: Patient noted to have significantly less bleeding and has elected not to have surgery with suction d&c at this time. Will plan on discharge.     Discharge to: Home    Readmission planned: no     Recommendations on Discharge:     Medications:      Medication List        START taking these medications      miSOPROStol 100 MCG tablet  Commonly known as: Cytotec  Take 8 tablets by mouth once for 1 dose Incomplete miscarriage            CONTINUE taking these medications      acetaminophen 500 MG tablet  Commonly known as: TYLENOL  Take 2 tablets by mouth 3 times daily     chlorhexidine 0.12 % solution  Commonly known as: PERIDEX     ibuprofen 600 MG tablet  Commonly known as: ADVIL;MOTRIN  Take 1 tablet by mouth every 6 hours as needed for Pain     PNV Prenatal Plus Multivitamin 27-1 MG Tabs  Take 1 tablet by mouth daily               Where to Get Your Medications        These medications were sent to [facility] [id number]  [address] [facility], [place] [address]      Phone: [phone]   miSOPROStol 100 MCG tablet           Activity: pelvic rest x 6 weeks, no driving on narcotics,Diet: regular diet  Follow up:  1 day  for  follow up     Condition on discharge: stable    Discharge date: 11/13/22    Juan Little DO  Ob/Gyn Resident    Comments:  Home care and follow-up care were reviewed. Pelvic rest, and birth control were reviewed. Signs and symptoms of mastitis and post partum depression were reviewed. The patient is to notify her physician if any of these occur. The patient was counseled on secondary smoke risks and the increased risk of sudden infant death syndrome and respiratory problems to her baby with exposure. She was counseled on various alternate recommendations to decrease the exposure to secondary smoke to her children.

## 2022-11-13 NOTE — ED NOTES
OB resident notified on increased vaginal bleeding saturating a pad, passing another large clot, decreased blood pressure and pt stating she feels like she is going to pass out.      Ja Vogel RN  11/13/22 0246

## 2022-11-13 NOTE — CONSULTS
[address] [facility]    Patient Name: Vida Mace     Patient : 1995  Room/Bed:   Admission Date/Time: 2022  7:30 PM  Primary Care Physician: Renetta Oreilly DO    Consulting Provider: Dr. Briana Low  Reason for Consult: heavy vaginal bleeding, quant 7000    CC:   Chief Complaint   Patient presents with    Vaginal Bleeding     6 weeks pregnan; was at [facility] yesterday for spotting     Abdominal Cramping                HPI: Vida Mace is a 32 y.o. female [id number] presents to the ED, c/o vaginal bleeding. She noticed light vaginal bleeding after intercourse four days ago. She presented to [facility] ED yesterday for vaginal bleeding. TVUS showed a 1.26cm which could reflect an early gestational sac with no fetal pole or yolk sac are identified, measuring 6w0d, with no adnexal masses identified. Her hCG was 7,346 and Hgb was stable at 15.0. This evening she noticed heavy vaginal bleeding that saturated two pads in 1 hour so she presented to [facility] ED. She also endorses passing small blood clots the size of a quarter. This is an unplanned but desired pregnancy. She denies any fever, chills, nausea, vomiting, severe abdominal pain, shortness of breath or dizziness. She has a history of 3  and a recent miscarriage in 2022. Patient's last menstrual period was 2022.      REVIEW OF SYSTEMS:  Constitutional: negative fever, negative chills  HEENT: negative visual disturbances, negative headaches  Respiratory: negative dyspnea, negative cough  Cardiovascular: negative chest pain,  negative palpitations  Gastrointestinal: negative abdominal pain, negative RUQ pain, negative N/V, negative diarrhea, negative constipation  Genitourinary: negative dysuria, negative vaginal discharge, + vaginal bleeding  Dermatological: negative rash  Hematologic: negative bruising  Immunologic/Lymphatic: negative recent illness, negative recent sick contact  Musculoskeletal: negative back pain, negative myalgias, negative arthralgias  Neurological:  negative dizziness, negative weakness  Behavior/Psych: negative depression, negative anxiety    _______________________________________________________________________      OBSTETRIC HISTORY:   OB History    Para Term  AB Living   4 3 3 0 0 3   SAB IAB Ectopic Molar Multiple Live Births   0 0 0 0 0 3      # Outcome Date GA Lbr [place]/2nd Weight Sex Delivery Anes PTL Lv   4 Current            3 Term 21 40w1d / 00:01 6 lb 12.1 oz (3.065 kg) F Vag-Spont None N JUANA      Name: Yenni Geraldine: 8  Apgar5: 9   2 Term 16 40w0d  6 lb 14 oz (3.118 kg) M Vag-Spont   JUANA   1 Term 14 40w0d  7 lb (3.175 kg) F Vag-Spont EPI N JUANA       PAST MEDICAL HISTORY:   has no past medical history on file. PAST SURGICAL HISTORY:   has a past surgical history that includes Tonsillectomy and Cystocopy. ALLERGIES:  Allergies as of 2022    (No Known Allergies)       MEDICATIONS:  No current facility-administered medications for this encounter. Current Outpatient Medications   Medication Sig Dispense Refill    Prenatal Vit-Fe Fumarate-FA (PNV PRENATAL PLUS MULTIVITAMIN) 27-1 MG TABS Take 1 tablet by mouth daily 30 tablet 0    chlorhexidine (PERIDEX) 0.12 % solution SWITH AND SPIT 10 MLS 2 TIMES PER DAY, START DAY AFTER SURGERY      acetaminophen (TYLENOL) 500 MG tablet Take 2 tablets by mouth 3 times daily 40 tablet 0    ibuprofen (ADVIL;MOTRIN) 600 MG tablet Take 1 tablet by mouth every 6 hours as needed for Pain (Patient not taking: Reported on 2022) 30 tablet 0       FAMILY HISTORY:  Family History of Breast, Ovarian, Colon or Uterine Cancer: No   family history includes Asthma in her brother; Heart Disease in her father. SOCIAL HISTORY:   reports that she has been smoking cigarettes. She has been smoking an average of .5 packs per day.  She has never used smokeless tobacco. She reports that she does not drink alcohol and does not use drugs. ________________________________________________________________________                                    Jef Left:  Vitals:    11/12/22 1929   BP: 126/85   Pulse: (!) 123   Resp: 23   Temp: 98.1 °F (36.7 °C)   SpO2: 93%                                                    INPUT/OUTPUT:  No intake/output data recorded. No intake/output data recorded. PHYSICAL EXAM:     General Appearance: Appears healthy. Alert; in no acute distress. Pleasant.   Neck and EENT: normal atraumatic, no neck masses  Respiratory: no conversational dyspnea  Cardiovascular: normal, regular rate and rhythm  Abdomen: soft, non-tender, and non-distended, no rebound, guarding, or rigidity  Pelvic Exam:   Chaperone for Intimate Exam: Chaperone was present for entire exam, Chaperone Name: Douglas Jackson RN  External genitalia: General appearance; normal, Hair distribution; normal, Lesions absent  Urinary system: urethral meatus normal  Vaginal: normal mucosa, moderate amount of small clots and bright red blood in posterior vault, no identifiable products of conception  Cervix: normal appearing cervix with external os visibly dilated, no active bleeding from os  Adnexa: normal adnexa in size, nontender and no masses  Uterus: normal single, nontender  Musculoskeletal: no gross abnormalities  Extremities: non-tender BLE and non-edematous  Psych:  mood and affect are within normal limits       LAB RESULTS:  Results for orders placed or performed during the hospital encounter of 11/12/22   CBC with Auto Differential   Result Value Ref Range    WBC 13.8 (H) 3.5 - 11.3 k/uL    RBC 4.52 3.95 - 5.11 m/uL    Hemoglobin 13.7 11.9 - 15.1 g/dL    Hematocrit 41.6 36.3 - 47.1 %    MCV 92.0 82.6 - 102.9 fL    MCH 30.3 25.2 - 33.5 pg    MCHC 32.9 28.4 - 34.8 g/dL    RDW 13.3 11.8 - 14.4 %    Platelets 928 194 - 872 k/uL    MPV 9.0 8.1 - 13.5 fL    NRBC Automated 0.0 0.0 per 100 WBC    Seg Neutrophils 61 36 - 65 %    Lymphocytes 32 24 - 43 %    Monocytes 5 3 - 12 %    Eosinophils % 2 1 - 4 %    Basophils 0 0 - 2 %    Immature Granulocytes 0 0 %    Segs Absolute 8.46 (H) 1.50 - 8.10 k/uL    Absolute Lymph # 4.35 (H) 1.10 - 3.70 k/uL    Absolute Mono # 0.67 0.10 - 1.20 k/uL    Absolute Eos # 0.21 0.00 - 0.44 k/uL    Basophils Absolute 0.06 0.00 - 0.20 k/uL    Absolute Immature Granulocyte 0.06 0.00 - 0.30 k/uL         DIAGNOSTICS:    US OB TRANSVAGINAL    Result Date: 11/12/2022  EXAMINATION: FIRST TRIMESTER OBSTETRIC ULTRASOUND 11/11/2022 TECHNIQUE: Transvaginal first trimester obstetric pelvic duplex ultrasound was performed with real-time imaging, color flow Doppler imaging, and spectral analysis. COMPARISON: 09/09/2020, 06/03/2020 HISTORY: ORDERING SYSTEM PROVIDED HISTORY: Vaginal bleeding, positive hCG without identifiable IUP TECHNOLOGIST PROVIDED HISTORY: Vaginal bleeding, positive hCG without identifiable IUP FINDINGS: Uterus: 9.9 x 5.0 x 5.9 cm Gestational Sac(s):  There is a fluid collection within the endometrium measuring approximately 1.26 cm. There is an adjacent heterogeneous mixed echogenic area measuring 2.6 x 2.4 x 1.2 cm suspicious for a subchorionic hemorrhage. Yolk Sac:  Not visualized Fetal Pole:  Not visualized Right ovary: 2.6 x 1.7 x 1.9 cm. Left ovary: 2.4 x 1.6 x 1.5 cm. Free fluid: No significant free fluid. 1. 1.26 cm fluid collection within the endometrium which could reflect an early gestational sac. However, no fetal pole or yolk sac are identified. If this does reflect an early gestational sac this would correlate to an estimated gestational age of 7 weeks, 0 days. A failed 1st trimester pregnancy or pseudo gestational sac in the setting of ectopic pregnancy cannot be excluded.   Recommend continued close clinical follow-up and correlation with serial beta HCGs. Consider a short-term follow-up ultrasound in 7-10 days to assess for viability. 2. 2.6 cm adjacent area mixed echogenicity suspicious for subchorionic hemorrhage. 3. Unremarkable bilateral ovaries. ASSESSMENT & PLAN:    Ayla Marte is a 32 y.o. female [id number] with incomplete miscarriage   - VSS, afebrile   - Tachycardia to 123   - Rh +    - Hgb stable 15.0 () > 13.7 today. Patient denies any s/s of anemia    - Leukocytosis noted: 12.9 () > 13.8   - hCG down trendin,346 > 5,032   - SSE: pooling of bright red blood and clots upon placement of speculum, total of 200mL removed with wall suction, no active bleeding noted from visibly dilated external os    - Vaginitis negative   - On reevaluation, patient fully saturated two full size lester pads within 40 minutes since speculum exam   - Given down trending hCG and significant vaginal bleeding on exam, suspect incomplete miscarriage. Due to continued vaginal bleeding, recommend admission overnight for further monitoring. Discussed medical vs surgical management of incomplete miscarriage. Patient opted for medical management with Cytotec overnight and reevaluation tomorrow morning. Discussed possibility of suction D&C if bleeding increases or patient becomes hemodynamically unstable. Patient agreeable and all questions answered.     - Cytotec 800mcg buccal x1   - Toradol/Tylenol/Zofran for symptomatic relief    - Pad counts   - CBC pending tomorrow morning    - Continue to monitor       Patient Active Problem List    Diagnosis Date Noted    Prenatal care, subsequent pregnancy, third trimester 2016     Priority: Medium    Tobacco smoking complicating pregnancy in third trimester 2016     Priority: Medium    Hidradenitis suppurativa 2021    Acute vaginitis 2021     21 F Apg 8/9 Wt 6#12 2021    40 weeks gestation of pregnancy 2021    Fetal tachycardia before the onset of labor 2020 Weeks of gestation of pregnancy not specified 07/28/2020    Cigarette smoker 01/29/2016       Plan discussed with Dr. Davie Spivey, who is agreeable.      Attending's Name: Dr. Leonid De Los Santos DO  Ob/Gyn Resident   [address]  11/12/2022, 8:20 PM

## 2022-11-13 NOTE — H&P
[address] [facility]    Patient Name: Saul De Santiago     Patient : 1995  Room/Bed:   Admission Date/Time: 2022  7:30 PM  Primary Care Physician: Aisha May DO    Consulting Provider: Dr. Rob Torres  Reason for Consult: heavy vaginal bleeding, quant 7000    CC:   Chief Complaint   Patient presents with    Vaginal Bleeding     6 weeks pregnan; was at [facility] yesterday for spotting     Abdominal Cramping                HPI: aSul De Santiago is a 32 y.o. female [id number] presents to the ED, c/o vaginal bleeding. She noticed light vaginal bleeding after intercourse four days ago. She presented to [facility] ED yesterday for vaginal bleeding. TVUS showed a 1.26cm which could reflect an early gestational sac with no fetal pole or yolk sac are identified, measuring 6w0d, with no adnexal masses identified. Her hCG was 7,346 and Hgb was stable at 15.0. This evening she noticed heavy vaginal bleeding that saturated two pads in 1 hour so she presented to [facility] ED. She also endorses passing small blood clots the size of a quarter. This is an unplanned but desired pregnancy. She denies any fever, chills, nausea, vomiting, severe abdominal pain, shortness of breath or dizziness. She has a history of 3  and a recent miscarriage in 2022. Patient's last menstrual period was 2022.      REVIEW OF SYSTEMS:  Constitutional: negative fever, negative chills  HEENT: negative visual disturbances, negative headaches  Respiratory: negative dyspnea, negative cough  Cardiovascular: negative chest pain,  negative palpitations  Gastrointestinal: negative abdominal pain, negative RUQ pain, negative N/V, negative diarrhea, negative constipation  Genitourinary: negative dysuria, negative vaginal discharge, + vaginal bleeding  Dermatological: negative rash  Hematologic: negative bruising  Immunologic/Lymphatic: negative recent illness, negative recent sick contact  Musculoskeletal: negative back pain, negative myalgias, negative arthralgias  Neurological:  negative dizziness, negative weakness  Behavior/Psych: negative depression, negative anxiety    _______________________________________________________________________      OBSTETRIC HISTORY:   OB History    Para Term  AB Living   4 3 3 0 0 3   SAB IAB Ectopic Molar Multiple Live Births   0 0 0 0 0 3      # Outcome Date GA Lbr [place]/2nd Weight Sex Delivery Anes PTL Lv   4 Current            3 Term 21 40w1d / 00:01 6 lb 12.1 oz (3.065 kg) F Vag-Spont None N JUANA      Name: Vivian Schuler: 8  Apgar5: 9   2 Term 16 40w0d  6 lb 14 oz (3.118 kg) M Vag-Spont   JUANA   1 Term 14 40w0d  7 lb (3.175 kg) F Vag-Spont EPI N JUANA       PAST MEDICAL HISTORY:   has no past medical history on file. PAST SURGICAL HISTORY:   has a past surgical history that includes Tonsillectomy and Cystocopy. ALLERGIES:  Allergies as of 2022    (No Known Allergies)       MEDICATIONS:  No current facility-administered medications for this encounter. Current Outpatient Medications   Medication Sig Dispense Refill    Prenatal Vit-Fe Fumarate-FA (PNV PRENATAL PLUS MULTIVITAMIN) 27-1 MG TABS Take 1 tablet by mouth daily 30 tablet 0    chlorhexidine (PERIDEX) 0.12 % solution SWITH AND SPIT 10 MLS 2 TIMES PER DAY, START DAY AFTER SURGERY      acetaminophen (TYLENOL) 500 MG tablet Take 2 tablets by mouth 3 times daily 40 tablet 0    ibuprofen (ADVIL;MOTRIN) 600 MG tablet Take 1 tablet by mouth every 6 hours as needed for Pain (Patient not taking: Reported on 2022) 30 tablet 0       FAMILY HISTORY:  Family History of Breast, Ovarian, Colon or Uterine Cancer: No   family history includes Asthma in her brother; Heart Disease in her father. SOCIAL HISTORY:   reports that she has been smoking cigarettes. She has been smoking an average of .5 packs per day.  She has never used smokeless tobacco. She reports that she does not drink alcohol and does not use drugs. ________________________________________________________________________                                    Phong Rodas:  Vitals:    11/12/22 2334   BP: 99/73   Pulse: 93   Resp:    Temp:    SpO2:                                                     INPUT/OUTPUT:  I/O this shift:  In: -   Out: 200 [Blood:200]  In: -   Out: 200                                                                                                                                PHYSICAL EXAM:     General Appearance: Appears healthy. Alert; in no acute distress. Pleasant. Neck and EENT: normal atraumatic, no neck masses  Respiratory: no conversational dyspnea  Cardiovascular: normal, regular rate and rhythm  Abdomen: soft, non-tender, and non-distended, no rebound, guarding, or rigidity  Pelvic Exam:   Chaperone for Intimate Exam: Chaperone was present for entire exam, Chaperone Name: Jess Murray RN  External genitalia: General appearance; normal, Hair distribution; normal, Lesions absent  Urinary system: urethral meatus normal  Vaginal: normal mucosa, moderate amount of small clots and bright red blood in posterior vault, no identifiable products of conception  Cervix: normal appearing cervix with external os visibly dilated, no active bleeding from os  Adnexa: normal adnexa in size, nontender and no masses  Uterus: normal single, nontender  Musculoskeletal: no gross abnormalities  Extremities: non-tender BLE and non-edematous  Psych:  mood and affect are within normal limits       LAB RESULTS:  Results for orders placed or performed during the hospital encounter of 11/12/22   Vaginitis DNA Probe    Specimen: Vaginal   Result Value Ref Range    Source . VAGINAL SWAB     Trichomonas Vaginalis DNA NEGATIVE NEGATIVE    Gardnerella Vaginalis, DNA Probe NEGATIVE NEGATIVE    Candida Species, DNA Probe NEGATIVE NEGATIVE   COVID-19, Rapid    Specimen: Nasopharyngeal Swab   Result Value Ref Range    Specimen Description . NASOPHARYNGEAL SWAB     SARS-CoV-2, Rapid Not Detected Not Detected   BMP   Result Value Ref Range    Glucose 167 (H) 70 - 99 mg/dL    BUN 8 6 - 20 mg/dL    Creatinine 0.47 (L) 0.50 - 0.90 mg/dL    Est, Glom Filt Rate >60 >60 mL/min/1.73m2    Calcium 9.0 8.6 - 10.4 mg/dL    Sodium 136 135 - 144 mmol/L    Potassium 4.1 3.7 - 5.3 mmol/L    Chloride 103 98 - 107 mmol/L    CO2 20 20 - 31 mmol/L    Anion Gap 13 9 - 17 mmol/L   CBC with Auto Differential   Result Value Ref Range    WBC 13.8 (H) 3.5 - 11.3 k/uL    RBC 4.52 3.95 - 5.11 m/uL    Hemoglobin 13.7 11.9 - 15.1 g/dL    Hematocrit 41.6 36.3 - 47.1 %    MCV 92.0 82.6 - 102.9 fL    MCH 30.3 25.2 - 33.5 pg    MCHC 32.9 28.4 - 34.8 g/dL    RDW 13.3 11.8 - 14.4 %    Platelets 080 957 - 418 k/uL    MPV 9.0 8.1 - 13.5 fL    NRBC Automated 0.0 0.0 per 100 WBC    Seg Neutrophils 61 36 - 65 %    Lymphocytes 32 24 - 43 %    Monocytes 5 3 - 12 %    Eosinophils % 2 1 - 4 %    Basophils 0 0 - 2 %    Immature Granulocytes 0 0 %    Segs Absolute 8.46 (H) 1.50 - 8.10 k/uL    Absolute Lymph # 4.35 (H) 1.10 - 3.70 k/uL    Absolute Mono # 0.67 0.10 - 1.20 k/uL    Absolute Eos # 0.21 0.00 - 0.44 k/uL    Basophils Absolute 0.06 0.00 - 0.20 k/uL    Absolute Immature Granulocyte 0.06 0.00 - 0.30 k/uL   HCG, QUANTITATIVE, PREGNANCY   Result Value Ref Range    hCG Quant 5,032 (H) <5 mIU/mL   TYPE AND SCREEN   Result Value Ref Range    Expiration Date 11/15/2022,2359     Arm Band Number [id number]     ABO/Rh A POSITIVE     Antibody Screen NEGATIVE          DIAGNOSTICS:    US OB TRANSVAGINAL    Result Date: 11/12/2022  EXAMINATION: FIRST TRIMESTER OBSTETRIC ULTRASOUND 11/11/2022 TECHNIQUE: Transvaginal first trimester obstetric pelvic duplex ultrasound was performed with real-time imaging, color flow Doppler imaging, and spectral analysis.  COMPARISON: 09/09/2020, 06/03/2020 HISTORY: ORDERING SYSTEM PROVIDED HISTORY: Vaginal bleeding, positive hCG without identifiable IUP TECHNOLOGIST PROVIDED HISTORY: Vaginal bleeding, positive hCG without identifiable IUP FINDINGS: Uterus: 9.9 x 5.0 x 5.9 cm Gestational Sac(s):  There is a fluid collection within the endometrium measuring approximately 1.26 cm. There is an adjacent heterogeneous mixed echogenic area measuring 2.6 x 2.4 x 1.2 cm suspicious for a subchorionic hemorrhage. Yolk Sac:  Not visualized Fetal Pole:  Not visualized Right ovary: 2.6 x 1.7 x 1.9 cm. Left ovary: 2.4 x 1.6 x 1.5 cm. Free fluid: No significant free fluid. 1. 1.26 cm fluid collection within the endometrium which could reflect an early gestational sac. However, no fetal pole or yolk sac are identified. If this does reflect an early gestational sac this would correlate to an estimated gestational age of 7 weeks, 0 days. A failed 1st trimester pregnancy or pseudo gestational sac in the setting of ectopic pregnancy cannot be excluded. Recommend continued close clinical follow-up and correlation with serial beta HCGs. Consider a short-term follow-up ultrasound in 7-10 days to assess for viability. 2. 2.6 cm adjacent area mixed echogenicity suspicious for subchorionic hemorrhage. 3. Unremarkable bilateral ovaries. ASSESSMENT & PLAN:    Leigh Lowery is a 32 y.o. female [id number] with incomplete miscarriage   - VSS, afebrile   - Tachycardia to 123   - Rh +    - Hgb stable 15.0 () > 13.7 today. Patient denies any s/s of anemia    - Leukocytosis noted: 12.9 () > 13.8   - hCG down trendin,346 > 5,032   - SSE: pooling of bright red blood and clots upon placement of speculum, total of 200mL removed with wall suction, no active bleeding noted from visibly dilated external os    - Vaginitis negative   - On reevaluation, patient fully saturated two full size lester pads within 40 minutes since speculum exam   - Given down trending hCG and significant vaginal bleeding on exam, suspect incomplete miscarriage.  Due to continued vaginal bleeding, recommend admission overnight for further monitoring. Discussed medical vs surgical management of incomplete miscarriage. Patient opted for medical management with Cytotec overnight and reevaluation tomorrow morning. Discussed possibility of suction D&C if bleeding increases or patient becomes hemodynamically unstable. Patient agreeable and all questions answered. - Cytotec 800mcg buccal x1   - Toradol/Tylenol/Zofran for symptomatic relief    - Pad counts   - CBC pending tomorrow morning    - Continue to monitor       Patient Active Problem List    Diagnosis Date Noted    Incomplete  2022     Priority: Medium    Tobacco smoking complicating pregnancy in third trimester 2016     Priority: Medium    Hidradenitis suppurativa 2021     21 F Apg 8/9 Wt 6#12 2021    Cigarette smoker 2016       Plan discussed with Dr. Rufina Aldana, who is agreeable.      Attending's Name: Dr. Jarrell Aparicio DO  Ob/Gyn Resident   [address]  2022, 12:17 AM

## 2022-11-13 NOTE — PROGRESS NOTES
Gynecology Progress Note    Date: 11/13/2022  Time: 6:48 AM    Sally Hubbard is a 32 y.o. female [id number] HD# 2    Patient was seen and examined. Patient reports passing 2 tennis ball sized clots prior to taking Cytotec. After taking Cytotec, patient noted bleeding and passing anther tennis ball sized clot. She felt flushed and lightheaded but denies any syncope. Patient was also hypotensive to 71/52 during this episode. BP improved within minutes. Stat CBC resulted as Hgb stable at 12.2. Pain is  controlled. Patient is NPO for suction D&C later this morning. She is urinating well . She denies any vaginal bleeding since passing clot at 0500. She is  ambulating without difficulty. She denies Fever/Chills, Chest Pain, SOB, N/V, Calf Pain    Vitals:  Vitals:    11/13/22 0514   BP: 106/63   Pulse: 86   Resp: 18   Temp:    SpO2: 100%       Intake/Output:   Last Shift: No intake/output data recorded. Current Shift: I/O this shift:  In: -   Out: 200 [Blood:200]    Physical Exam:  General:  no apparent distress, alert, and cooperative  Neurologic:  alert, oriented, normal speech, no focal findings or movement disorder noted  Lungs:  no conversational dyspnea  Heart:  regular rate and rhythm and no murmur    Abdomen: Abdomen soft, non-tender.  BS normal. No masses,  No organomegaly  Extremities:  no calf tenderness, non edematous    Lab:  Complete Blood Count:   Recent Labs     11/11/22 1812 11/12/22 2005 11/13/22  0456   WBC 12.9* 13.8* 15.8*   HGB 15.0 13.7 12.2   HCT 42.4 41.6 36.3    422 397        PT/INR:    Lab Results   Component Value Date/Time    PROTIME 12.3 11/11/2022 06:12 PM    INR 0.9 11/11/2022 06:12 PM     PTT:    No results found for: APTT, PTT    Comprehensive Metabolic Profile:   Recent Labs     11/11/22 1812 11/12/22 2005    136   K 4.2 4.1    103   CO2 25 20   BUN 7 8   CREATININE 0.52 0.47*   GLUCOSE 116* 167*   CALCIUM 9.4 9.0   PROT 7.4  --    LABALBU 4.5  --    BILITOT 0.3  --    ALKPHOS 87  --    AST 20  --    ALT 18  --         Cultures:  Vaginitis: negative    Assessment/Plan:  Jeremy Matamoros 32 y.o. female [id number] HD# 2 admitted with incomplete miscarriage   - Doing well, vitals stable   - BP improved to 106/63    - Patient denies any continued vaginal bleeding since passing clot at 0500   - IVF bolus running   - Pain well controlled with Toradol/Tylenol    - S/p Cytotec 800 mcg buccal x1   - Consent signed for suction D&C   - Doxycycline 200mg oral x1 to be given prior to [facility] to contact surgery to schedule suction D&C this morning    - Continue care, please page with any questions       Patient Active Problem List    Diagnosis Date Noted    Incomplete  2022     Priority: Medium    Tobacco smoking complicating pregnancy in third trimester 2016     Priority: Medium    Hidradenitis suppurativa 2021     21 F Apg 8/9 Wt 6#12 2021    Cigarette smoker 2016     Attending and senior resident updated and in agreement with plan.      Tae Milian DO  Ob/Gyn Resident   2022, 6:48 AM

## 2022-11-13 NOTE — ED PROVIDER NOTES
[facility] ED  Emergency Department Encounter  Emergency Medicine Resident     Pt Name: Billi Sever  MRN: [id number]  [place] 1995  Date of evaluation: 11/12/22  PCP:  Elmer Hayden DO    CHIEF COMPLAINT       Chief Complaint   Patient presents with    Vaginal Bleeding     6 weeks pregnan; was at [facility] yesterday for spotting     Abdominal Cramping       HISTORY OFPRESENT ILLNESS  (Location/Symptom, Timing/Onset, Context/Setting, Quality, Duration, Modifying Factors,Severity.)      Billi Sever is a 32 y.o. female who presents with vaginal bleeding. [facility]ting in early pregnancy. Last menstrual period was August 26, hCG done yesterday was over 7000. Transvaginal ultrasound was performed that showed a 1.6 cm fluid collection within the endometrium without fetal pole or yolk sac. Estimated gestational age of 6 weeks 0 days. Patient had onset of heavy vaginal bleeding around 7 PM, 45 minutes prior to arrival.  She has already saturated 2 pads and continues to bleed heavily. She has mild pelvic cramping. She reports previous miscarriages early in pregnancy. No nausea or vomiting. No fevers or chills. No urinary symptoms. PAST MEDICAL / SURGICAL / SOCIAL / FAMILY HISTORY      has no past medical history on file. has a past surgical history that includes Tonsillectomy and Cystocopy.      Social History     Socioeconomic History    Marital status: Single     Spouse name: Not on file    Number of children: Not on file    Years of education: Not on file    Highest education level: Not on file   Occupational History    Not on file   Tobacco Use    Smoking status: Every Day     Packs/day: 0.50     Types: Cigarettes    Smokeless tobacco: Never   Vaping Use    Vaping Use: Never used   Substance and Sexual Activity    Alcohol use: No    Drug use: No    Sexual activity: Yes     Partners: Male   Other Topics Concern    Not on file   Social History Narrative    Not on file     Social Determinants of Health     Financial Resource Strain: Low Risk     Difficulty of Paying Living Expenses: Not hard at all   Food Insecurity: No Food Insecurity    Worried About [address] in the Last Year: Never true    [facility] in the Last Year: Never true   Transportation Needs: No Transportation Needs    Lack of Transportation (Medical): No    Lack of Transportation (Non-Medical): No   Physical Activity: Not on file   Stress: Not on file   Social Connections: Not on file   Intimate Partner Violence: Not on file   Housing Stability: Not on file       Family History   Problem Relation Age of Onset    Heart Disease Father     Asthma Brother         Allergies:  Patient has no known allergies. Home Medications:  Prior to Admission medications    Medication Sig Start Date End Date Taking? Authorizing Provider   Prenatal Vit-Fe Fumarate-FA (PNV PRENATAL PLUS MULTIVITAMIN) 27-1 MG TABS Take 1 tablet by mouth daily 11/11/22 12/11/22  Darby Longoria MD   chlorhexidine (PERIDEX) 0.12 % solution SWITH AND SPIT 10 MLS 2 TIMES PER DAY, START DAY AFTER SURGERY 8/31/21   Historical Provider, MD   acetaminophen (TYLENOL) 500 MG tablet Take 2 tablets by mouth 3 times daily 5/25/21   Nicole Sampson DO   ibuprofen (ADVIL;MOTRIN) 600 MG tablet Take 1 tablet by mouth every 6 hours as needed for Pain  Patient not taking: Reported on 6/22/2022 5/25/21   Nicole Sampson DO       REVIEW OFSYSTEMS    (2-9 systems for level 4, 10 or more for level 5)      Review of Systems   Constitutional:  Negative for chills and fever. HENT:  Negative for congestion and rhinorrhea. Eyes:  Negative for visual disturbance. Respiratory:  Negative for cough and shortness of breath. Cardiovascular:  Negative for chest pain. Gastrointestinal:  Positive for abdominal pain. Negative for diarrhea, nausea and vomiting.    Genitourinary:  Positive for vaginal bleeding. Negative for dysuria and frequency. Musculoskeletal:  Negative for back pain and neck pain. Skin:  Negative for rash and wound. Neurological:  Negative for weakness, numbness and headaches. PHYSICAL EXAM   (up to 7 for level 4, 8 or more forlevel 5)      INITIAL VITALS:   ED Triage Vitals [11/12/22 1929]   BP Temp Temp src Heart Rate Resp SpO2 Height Weight   126/85 98.1 °F (36.7 °C) -- (!) 123 23 93 % -- --       Physical Exam  Constitutional:       General: She is not in acute distress. Appearance: Normal appearance. She is normal weight. She is not ill-appearing, toxic-appearing or diaphoretic. HENT:      Head: Normocephalic and atraumatic. Nose: Nose normal.      Mouth/Throat:      Mouth: Mucous membranes are moist.      Pharynx: Oropharynx is clear. No oropharyngeal exudate or posterior oropharyngeal erythema. Eyes:      Extraocular Movements: Extraocular movements intact. Pupils: Pupils are equal, round, and reactive to light. Cardiovascular:      Rate and Rhythm: Normal rate and regular rhythm. Heart sounds: Normal heart sounds. No murmur heard. Pulmonary:      Effort: Pulmonary effort is normal. No respiratory distress. Breath sounds: Normal breath sounds. No wheezing, rhonchi or rales. Abdominal:      General: There is no distension. Palpations: Abdomen is soft. Tenderness: There is abdominal tenderness (Mild suprapubic tenderness). There is no guarding or rebound. Genitourinary:     Comments:  exam performed with chaperone in the room. Multiple large blood clots present in the vaginal vault, small amount of active bleeding present. Cervix appears open but unable to obtain full view of the cervix. Patient had discomfort with this exam.  Musculoskeletal:         General: Normal range of motion. Cervical back: Normal range of motion and neck supple. Right lower leg: No edema. Left lower leg: No edema.    Skin:     General: Skin is warm and dry. Neurological:      General: No focal deficit present. Mental Status: She is alert and oriented to person, place, and time. Motor: No weakness. Psychiatric:         Mood and Affect: Mood normal.       DIFFERENTIAL  DIAGNOSIS     PLAN (LABS / IMAGING / EKG):  Orders Placed This Encounter   Procedures    Vaginitis DNA Probe    C.trachomatis N.gonorrhoeae DNA    COVID-19, Rapid    US OB TRANSVAGINAL    BMP    CBC with Auto Differential    HCG, QUANTITATIVE, PREGNANCY    Inpatient consult to Obstetrics / Gynecology    TYPE AND SCREEN    Place in Observation Service       MEDICATIONS ORDERED:  Orders Placed This Encounter   Medications    fentaNYL (SUBLIMAZE) injection 50 mcg       DDX: Threatened , inevitable     Initial MDM/Plan/ED COURSE:    32 y.o. female who presents with vaginal bleeding in early pregnancy. Patient appears uncomfortable on exam.  Vitals demonstrate tachycardia of 123, likely related to pain and active bleeding. Mild lower abdominal tenderness as well. We will perform pelvic exam to evaluate cervix and discussed the case with OB given heavy amount of bleeding. hCG quant ordered to trend. But this is decreasing, consistent with likely miscarriage. ED Course as of 22 2310   Sat  hCG Quant(!): 5,032  Decreased from 7000. Pelvic exam repeated by OB resident. Os is open, bleeding fairly well controlled. [JS]      ED Course User Index  [JS] Sangeeta Schmitt DO      Discussed the case with OB. The repeated the pelvic exam and reports that the os is open to the miscarriage. They would like an ultrasound but ultrasound is not available for routine exam at this time at night. Patient monitored for additional time and continues to saturate 2 pads per hour. Admitted to the [facility] service for continued observation and Cytotec.      DIAGNOSTIC RESULTS / EMERGENCYDEPARTMENT COURSE / MDM     LABS:  Labs Reviewed   BASIC METABOLIC PANEL - Abnormal; Notable for the following components:       Result Value    Glucose 167 (*)     Creatinine 0.47 (*)     All other components within normal limits   CBC WITH AUTO DIFFERENTIAL - Abnormal; Notable for the following components:    WBC 13.8 (*)     Segs Absolute 8.46 (*)     Absolute Lymph # 4.35 (*)     All other components within normal limits   HCG, QUANTITATIVE, PREGNANCY - Abnormal; Notable for the following components:    hCG Quant 5,032 (*)     All other components within normal limits   VAGINITIS DNA PROBE   C.TRACHOMATIS N.GONORRHOEAE DNA   COVID-19, RAPID   TYPE AND SCREEN           No results found. EKG      All EKG's are interpreted by the Emergency Department Physicianwho either signs or Co-signs this chart in the absence of a cardiologist.      PROCEDURES:  None    CONSULTS:  IP CONSULT TO OB GYN    CRITICAL CARE:  Please see attending note    FINAL IMPRESSION      1. Miscarriage          DISPOSITION / PLAN     DISPOSITION Admitted 11/12/2022 10:40:00 PM      PATIENT REFERRED TO:  No follow-up provider specified.     DISCHARGE MEDICATIONS:  New Prescriptions    No medications on file       Nicolette Islas DO  Emergency Medicine Resident    (Please note that portions of this note were completed with a voice recognition program.Efforts were made to edit the dictations but occasionally words are mis-transcribed.)        Nicolette Islas DO  Resident  11/12/22 2466

## 2022-11-13 NOTE — PROGRESS NOTES
Gynecology Rounds      Date: 2022  Time: 10:00 AM      Patient Name: Adithya Valadez  Patient : 1995  Room/Bed: 66/6978-00  Admission Date/Time: 2022  7:30 PM  MRN #: [id number]  [facility] #: [id number]        Attending Physician Statement  I have discussed the care of Adithya Valadez, including pertinent history and exam findings,  with the resident. I have reviewed their note in the electronic medical record. I have seen and examined the patient and the key elements of all parts of the encounter have been performed/reviewed by me . I agree with the assessment, plan and orders as documented by the resident. Pt seen. Pt states her bleeding and spotting have subsided. Pt does not desire a suction D&C. Pt wishes to have a repeat sono done. Pt will decide if she wishes any further intervention after the sono. Vitals:    22 0951   BP: 113/64   Pulse: 85   Resp: 18   Temp: 98.3 °F (36.8 °C)   SpO2: 97%       Admission on 2022   Component Date Value Ref Range Status    Glucose 2022 167 (A)  70 - 99 mg/dL Final    BUN 2022 8  6 - 20 mg/dL Final    Creatinine 2022 0.47 (A)  0.50 - 0.90 mg/dL Final    Est, Glom Filt Rate 2022 >60  >60 mL/min/1.73m2 Final    Comment:       Effective Oct 3, 2022        These results are not intended for use in patients <25years of age. eGFR results are calculated without a race factor using the  CKD-EPI equation. Careful clinical correlation is recommended, particularly when comparing to results   calculated using previous equations. The CKD-EPI equation is less accurate in patients with extremes of muscle mass, extra-renal   metabolism of creatine, excessive creatine ingestion, or following therapy that affects   renal tubular secretion.       Calcium 2022 9.0  8.6 - 10.4 mg/dL Final    Sodium 2022 136  135 - 144 mmol/L Final    Potassium 2022 4.1  3.7 - 5.3 mmol/L Final    Chloride 2022 103  98 - 107 mmol/L Final    CO2 11/12/2022 20  20 - 31 mmol/L Final    Anion Gap 11/12/2022 13  9 - 17 mmol/L Final    WBC 11/12/2022 13.8 (A)  3.5 - 11.3 k/uL Final    RBC 11/12/2022 4.52  3.95 - 5.11 m/uL Final    Hemoglobin 11/12/2022 13.7  11.9 - 15.1 g/dL Final    Hematocrit 11/12/2022 41.6  36.3 - 47.1 % Final    MCV 11/12/2022 92.0  82.6 - 102.9 fL Final    MCH 11/12/2022 30.3  25.2 - 33.5 pg Final    MCHC 11/12/2022 32.9  28.4 - 34.8 g/dL Final    RDW 11/12/2022 13.3  11.8 - 14.4 % Final    Platelets 87/36/7743 422  138 - 453 k/uL Final    MPV 11/12/2022 9.0  8.1 - 13.5 fL Final    NRBC Automated 11/12/2022 0.0  0.0 per 100 WBC Final    Seg Neutrophils 11/12/2022 61  36 - 65 % Final    Lymphocytes 11/12/2022 32  24 - 43 % Final    Monocytes 11/12/2022 5  3 - 12 % Final    Eosinophils % 11/12/2022 2  1 - 4 % Final    Basophils 11/12/2022 0  0 - 2 % Final    Immature Granulocytes 11/12/2022 0  0 % Final    Segs Absolute 11/12/2022 8.46 (A)  1.50 - 8.10 k/uL Final    Absolute Lymph # 11/12/2022 4.35 (A)  1.10 - 3.70 k/uL Final    Absolute Mono # 11/12/2022 0.67  0.10 - 1.20 k/uL Final    Absolute Eos # 11/12/2022 0.21  0.00 - 0.44 k/uL Final    Basophils Absolute 11/12/2022 0.06  0.00 - 0.20 k/uL Final    Absolute Immature Granulocyte 11/12/2022 0.06  0.00 - 0.30 k/uL Final    hCG Quant 11/12/2022 5,032 (A)  <5 mIU/mL Final    Comment:    Non-preg premeno   <=5  Postmeno           <=8  Male               <=3  If HCG results do not concur with clinical observations, additional testing to confirm   results is recommended. Source 11/12/2022 . VAGINAL SWAB   Final    Trichomonas Vaginalis DNA 11/12/2022 NEGATIVE  NEGATIVE Final    for Trichomonas Vaginalis    Gardnerella Vaginalis, DNA Probe 11/12/2022 NEGATIVE  NEGATIVE Final    for Gardnerella vaginalis    Candida Species, DNA Probe 11/12/2022 NEGATIVE  NEGATIVE Final    Comment: for Candida sp.   Method of testing is a DNA probe intended for detection and identification of Candida   species, Gardnerella vaginalis, and Trichomonas vaginalis nucleic acid in vaginal fluid   specimens from patients with symptoms of vaginitis/vaginosis. Expiration Date 11/12/2022 11/15/2022,2359   Final    Arm Band Number 11/12/2022 BE [id number]   Final    ABO/Rh 11/12/2022 A POSITIVE   Final    Antibody Screen 11/12/2022 NEGATIVE   Final    Specimen Description 11/12/2022 . NASOPHARYNGEAL SWAB   Final    SARS-CoV-2, Rapid 11/12/2022 Not Detected  Not Detected Final    Comment:       Rapid NAAT:  The specimen is NEGATIVE for SARS-CoV-2, the novel coronavirus associated with   COVID-19. The ID NOW COVID-19 assay is designed to detect the virus that causes COVID-19 in patients   with signs and symptoms of infection who are suspected of COVID-19. An individual without symptoms of COVID-19 and who is not shedding SARS-CoV-2 virus would   expect to have a negative (not detected) result in this assay. Negative results should be treated as presumptive and, if inconsistent with clinical signs   and symptoms or necessary for patient management,  should be tested with an alternative molecular assay. Negative results do not preclude   SARS-CoV-2 infection and   should not be used as the sole basis for patient management decisions.          Fact sheet for Healthcare Providers: http://www.nellie.farida/  Fact sheet for Patients: http://www.felicity-moni.farida/        Methodology: Isothermal Nucleic Acid Amplification      WBC 11/13/2022 15.8 (A)  3.5 - 11.3 k/uL Final    RBC 11/13/2022 3.90 (A)  3.95 - 5.11 m/uL Final    Hemoglobin 11/13/2022 12.2  11.9 - 15.1 g/dL Final    Hematocrit 11/13/2022 36.3  36.3 - 47.1 % Final    MCV 11/13/2022 93.1  82.6 - 102.9 fL Final    MCH 11/13/2022 31.3  25.2 - 33.5 pg Final    MCHC 11/13/2022 33.6  28.4 - 34.8 g/dL Final    RDW 11/13/2022 13.2  11.8 - 14.4 % Final    Platelets 46/94/9799 397  138 - 453 k/uL Final    MPV 11/13/2022 9.0  8.1 - 13.5 fL Final    NRBC Automated 11/13/2022 0.0  0.0 per 100 WBC Final   Follow BHCGs until negative  Pelvic rest    Home care, Restrictions & Follow up Care review completed    RTO 1 weeks          Attending's Name:  Electronically signed by Jose Wheat DO on 11/13/2022 at 10:00 AM

## 2022-11-13 NOTE — PROGRESS NOTES
OB/GYN RESIDENT INTERVAL NOTE      Patient seen and evaluated, she elected for an TVUS for her miscarriage prior to surgery which showed products in the lower uterine segment and cervix. She denies heavy vaginal bleeding at this time. Discussed all options including suction dilation and curettage, and patient declines surgery at this time. She would like to go home with close follow up and another dose of Cytotec. Counseled patient to expect more bleeding before resolution and given return precautions. Patient has outpatient follow up with her primary OB/GYN tomorrow. Plan for discharge at this time. Vitals:    11/13/22 0514 11/13/22 0715 11/13/22 0900 11/13/22 0951   BP: 106/63 106/73  113/64   Pulse: 86 80  85   Resp: 18 18  18   Temp:    98.3 °F (36.8 °C)   TempSrc:    Oral   SpO2: 100% 100%  97%   Weight:   198 lb 6.6 oz (90 kg)    Height:   5' 2\" (1.575 m)          Recent Results (from the past 12 hour(s))   CBC    Collection Time: 11/13/22  4:56 AM   Result Value Ref Range    WBC 15.8 (H) 3.5 - 11.3 k/uL    RBC 3.90 (L) 3.95 - 5.11 m/uL    Hemoglobin 12.2 11.9 - 15.1 g/dL    Hematocrit 36.3 36.3 - 47.1 %    MCV 93.1 82.6 - 102.9 fL    MCH 31.3 25.2 - 33.5 pg    MCHC 33.6 28.4 - 34.8 g/dL    RDW 13.2 11.8 - 14.4 %    Platelets 334 292 - 482 k/uL    MPV 9.0 8.1 - 13.5 fL    NRBC Automated 0.0 0.0 per 100 WBC         Plan discussed with senior resident and attending.         Jesús Hadley DO  OB/GYN Resident  [address]  11/13/2022 12:23 PM

## 2022-11-13 NOTE — ED TRIAGE NOTES
PT presents to ED with c/o vaginal bleeding that became heavy at and states she is 6 weeks pregnant  with intermittent cramping in lower ABD.  PT has a hx of miscarriages Pt has currently soaked 2 pads in an hour

## 2022-11-13 NOTE — ED PROVIDER NOTES
[facility]     Emergency Department     Faculty Attestation    I performed a history and physical examination of the patient and discussed management with the resident. I reviewed the residents note and agree with the documented findings and plan of care. Any areas of disagreement are noted on the chart. I was personally present for the key portions of any procedures. I have documented in the chart those procedures where I was not present during the key portions. I have reviewed the emergency nurses triage note. I agree with the chief complaint, past medical history, past surgical history, allergies, medications, social and family history as documented unless otherwise noted below. For Physician Assistant/ Nurse Practitioner cases/documentation I have personally evaluated this patient and have completed at least one if not all key elements of the E/M (history, physical exam, and MDM). Additional findings are as noted. I have personally seen and evaluated the patient. I find the patient's history and physical exam are consistent with the NP/PA documentation. I agree with the care provided, treatment rendered, disposition and follow-up plan. Reporting heavy vaginal bleeding was seen yesterday at another facility where she had an indeterminate ultrasound and a quant greater than 7000. She does describe intermittent cramping primarily in the left lower quadrant of the abdomen currently no pain heavy bleeding has been noted in the last hour. OB/GYN will be consulted      Critical Care     Carolina Sparks M.D.   Attending Emergency  Physician           Anthony Arreola MD  11/12/22 2000

## 2022-11-14 ENCOUNTER — OFFICE VISIT (OUTPATIENT)
Dept: OBGYN CLINIC | Age: 27
End: 2022-11-14
Payer: MEDICARE

## 2022-11-14 VITALS
HEIGHT: 62 IN | DIASTOLIC BLOOD PRESSURE: 80 MMHG | HEART RATE: 96 BPM | WEIGHT: 196 LBS | BODY MASS INDEX: 36.07 KG/M2 | SYSTOLIC BLOOD PRESSURE: 124 MMHG

## 2022-11-14 DIAGNOSIS — O03.4 INCOMPLETE MISCARRIAGE: Primary | ICD-10-CM

## 2022-11-14 PROCEDURE — G8484 FLU IMMUNIZE NO ADMIN: HCPCS | Performed by: CLINICAL NURSE SPECIALIST

## 2022-11-14 PROCEDURE — G8427 DOCREV CUR MEDS BY ELIG CLIN: HCPCS | Performed by: CLINICAL NURSE SPECIALIST

## 2022-11-14 PROCEDURE — 4004F PT TOBACCO SCREEN RCVD TLK: CPT | Performed by: CLINICAL NURSE SPECIALIST

## 2022-11-14 PROCEDURE — G8417 CALC BMI ABV UP PARAM F/U: HCPCS | Performed by: CLINICAL NURSE SPECIALIST

## 2022-11-14 PROCEDURE — 99213 OFFICE O/P EST LOW 20 MIN: CPT | Performed by: CLINICAL NURSE SPECIALIST

## 2022-11-14 ASSESSMENT — ENCOUNTER SYMPTOMS
EYES NEGATIVE: 1
RESPIRATORY NEGATIVE: 1
ALLERGIC/IMMUNOLOGIC NEGATIVE: 1
GASTROINTESTINAL NEGATIVE: 1

## 2022-11-14 NOTE — PROGRESS NOTES
Subjective:      Patient ID:  Willie Kiser is a 32 y.o. female who presents for   Chief Complaint   Patient presents with    Amenorrhea       HPI    Patient is a 31 yo female who presents for incomplete miscarriage. Patient was seen in the ER on 11/13 and she was given cytotec while in the ER and a script for home but the pharmacy was out of cytotec and will notify her when it is in. Patient states that she is currently spotting. Patient reports that she will have bleeding that is heavy at times then it will slow down and she will just have spotting. She does report that she did pass 3 very large clots in the ER before she took the cytotec. Review of Systems   Constitutional:  Negative for chills and fever. HENT: Negative. Eyes: Negative. Respiratory: Negative. Cardiovascular: Negative. Gastrointestinal: Negative. Endocrine: Negative. Genitourinary:  Positive for vaginal bleeding (small amount of blood but states that it does increase at times). Negative for dysuria and menstrual problem. Musculoskeletal: Negative. Skin: Negative. Allergic/Immunologic: Negative. Neurological: Negative. Hematological: Negative. Psychiatric/Behavioral: Negative. /80   Pulse 96   Ht 5' 2\" (1.575 m)   Wt 196 lb (88.9 kg)   LMP 08/28/2022   BMI 35.85 kg/m²    Patient's last menstrual period was 08/28/2022. Family History   Problem Relation Age of Onset    Heart Disease Father     Asthma Brother       History reviewed. No pertinent past medical history.    Past Surgical History:   Procedure Laterality Date    CYSTOSCOPY      TONSILLECTOMY        Social History     Socioeconomic History    Marital status: Single     Spouse name: None    Number of children: None    Years of education: None    Highest education level: None   Tobacco Use    Smoking status: Every Day     Packs/day: 0.50     Types: Cigarettes    Smokeless tobacco: Never   Vaping Use    Vaping Use: Never used Substance and Sexual Activity    Alcohol use: No    Drug use: No    Sexual activity: Yes     Partners: Male     Social Determinants of Health     Financial Resource Strain: Low Risk     Difficulty of Paying Living Expenses: Not hard at all   Food Insecurity: No Food Insecurity    Worried About [address] in the Last Year: Never true    [address] [facility] in the Last Year: Never true   Transportation Needs: No Transportation Needs    Lack of Transportation (Medical): No    Lack of Transportation (Non-Medical): No      Current Outpatient Medications   Medication Sig Dispense Refill    acetaminophen (TYLENOL) 500 MG tablet Take 2 tablets by mouth 3 times daily 40 tablet 0    miSOPROStol (CYTOTEC) 100 MCG tablet Take 8 tablets by mouth once for 1 dose Incomplete miscarriage (Patient not taking: Reported on 11/14/2022) 8 tablet 0     No current facility-administered medications for this visit. Objective:   Physical Exam  Vitals reviewed. Constitutional:       Appearance: She is well-developed. HENT:      Head: Normocephalic and atraumatic. Cardiovascular:      Rate and Rhythm: Normal rate and regular rhythm. Pulmonary:      Effort: Pulmonary effort is normal.      Breath sounds: Normal breath sounds. Musculoskeletal:         General: Normal range of motion. Skin:     General: Skin is warm and dry. Neurological:      Mental Status: She is oriented to person, place, and time. Psychiatric:         Behavior: Behavior normal.         Thought Content: Thought content normal.         Judgment: Judgment normal.       Assessment:      Diagnosis Orders   1. Incomplete miscarriage  HCG, Quantitative, Pregnancy    95578 - Venipuncture    HCG, Quantitative, Pregnancy            Plan:      Return for as needed, 1 wk for labs. Patient was seen with total face to face time of 20 minutes. More than 50% of this visit was on counseling andeducation regarding the problems listed below and her options.  She was also counseled on her preventative health maintenance recommendations and follow-up.     Electronically signed by: Titi Sapp CNP

## 2022-11-21 ENCOUNTER — NURSE ONLY (OUTPATIENT)
Dept: OBGYN CLINIC | Age: 27
End: 2022-11-21
Payer: MEDICARE

## 2022-11-21 ENCOUNTER — HOSPITAL ENCOUNTER (OUTPATIENT)
Age: 27
Setting detail: SPECIMEN
Discharge: HOME | End: 2022-11-21

## 2022-11-21 DIAGNOSIS — O03.4 INCOMPLETE MISCARRIAGE: ICD-10-CM

## 2022-11-21 DIAGNOSIS — O03.4 INCOMPLETE MISCARRIAGE: Primary | ICD-10-CM

## 2022-11-21 LAB — HCG QUANTITATIVE: 297 MIU/ML

## 2022-11-21 PROCEDURE — 36415 COLL VENOUS BLD VENIPUNCTURE: CPT | Performed by: CLINICAL NURSE SPECIALIST

## 2022-11-21 NOTE — PROGRESS NOTES
Patient was in the office today for a qbhcg   Draw per physician order using sterile technique.   Drawn from the left antecubital

## 2022-11-22 ENCOUNTER — TELEPHONE (OUTPATIENT)
Dept: OBGYN CLINIC | Age: 27
End: 2022-11-22

## 2022-11-22 NOTE — TELEPHONE ENCOUNTER
----- Message from MACK Dempsey CNP sent at 11/22/2022  7:56 AM EST -----  Patient is miscarring her Sandy Kida dropped significantly from 5032 to 297

## 2022-12-06 ENCOUNTER — NURSE ONLY (OUTPATIENT)
Dept: OBGYN CLINIC | Age: 27
End: 2022-12-06
Payer: MEDICARE

## 2022-12-06 ENCOUNTER — HOSPITAL ENCOUNTER (OUTPATIENT)
Age: 27
Setting detail: SPECIMEN
Discharge: HOME | End: 2022-12-06

## 2022-12-06 DIAGNOSIS — O03.4 INCOMPLETE MISCARRIAGE: Primary | ICD-10-CM

## 2022-12-06 PROCEDURE — 36415 COLL VENOUS BLD VENIPUNCTURE: CPT | Performed by: CLINICAL NURSE SPECIALIST

## 2022-12-07 DIAGNOSIS — O03.4 INCOMPLETE MISCARRIAGE: Primary | ICD-10-CM

## 2022-12-07 DIAGNOSIS — O03.4 INCOMPLETE MISCARRIAGE: ICD-10-CM

## 2022-12-07 LAB — HCG QUANTITATIVE: 14 MIU/ML

## 2022-12-07 PROCEDURE — 36415 COLL VENOUS BLD VENIPUNCTURE: CPT | Performed by: CLINICAL NURSE SPECIALIST

## 2022-12-20 ENCOUNTER — HOSPITAL ENCOUNTER (OUTPATIENT)
Age: 27
Setting detail: SPECIMEN
Discharge: HOME | End: 2022-12-20

## 2022-12-20 ENCOUNTER — NURSE ONLY (OUTPATIENT)
Dept: OBGYN CLINIC | Age: 27
End: 2022-12-20
Payer: MEDICARE

## 2022-12-20 DIAGNOSIS — O03.4 INCOMPLETE MISCARRIAGE: ICD-10-CM

## 2022-12-20 DIAGNOSIS — O03.4 INCOMPLETE MISCARRIAGE: Primary | ICD-10-CM

## 2022-12-20 LAB — HCG QUANTITATIVE: 1 MIU/ML

## 2022-12-20 PROCEDURE — 36415 COLL VENOUS BLD VENIPUNCTURE: CPT | Performed by: CLINICAL NURSE SPECIALIST

## 2023-01-17 ENCOUNTER — OFFICE VISIT (OUTPATIENT)
Dept: FAMILY MEDICINE CLINIC | Age: 28
End: 2023-01-17
Payer: MEDICARE

## 2023-01-17 ENCOUNTER — HOSPITAL ENCOUNTER (OUTPATIENT)
Age: 28
Setting detail: SPECIMEN
Discharge: HOME | End: 2023-01-17

## 2023-01-17 VITALS
OXYGEN SATURATION: 100 % | BODY MASS INDEX: 34.96 KG/M2 | TEMPERATURE: 98.2 F | HEIGHT: 62 IN | SYSTOLIC BLOOD PRESSURE: 138 MMHG | DIASTOLIC BLOOD PRESSURE: 85 MMHG | HEART RATE: 93 BPM | WEIGHT: 190 LBS

## 2023-01-17 DIAGNOSIS — H65.93 FLUID LEVEL BEHIND TYMPANIC MEMBRANE OF BOTH EARS: Primary | ICD-10-CM

## 2023-01-17 DIAGNOSIS — J02.9 SORE THROAT: ICD-10-CM

## 2023-01-17 DIAGNOSIS — R09.81 NASAL CONGESTION: ICD-10-CM

## 2023-01-17 LAB — S PYO AG THROAT QL: NORMAL

## 2023-01-17 PROCEDURE — G8427 DOCREV CUR MEDS BY ELIG CLIN: HCPCS

## 2023-01-17 PROCEDURE — G8417 CALC BMI ABV UP PARAM F/U: HCPCS

## 2023-01-17 PROCEDURE — 99203 OFFICE O/P NEW LOW 30 MIN: CPT

## 2023-01-17 PROCEDURE — 87880 STREP A ASSAY W/OPTIC: CPT

## 2023-01-17 PROCEDURE — 4004F PT TOBACCO SCREEN RCVD TLK: CPT

## 2023-01-17 PROCEDURE — G8484 FLU IMMUNIZE NO ADMIN: HCPCS

## 2023-01-17 RX ORDER — FLUTICASONE PROPIONATE 50 MCG
2 SPRAY, SUSPENSION (ML) NASAL DAILY
Qty: 16 G | Refills: 0 | Status: SHIPPED | OUTPATIENT
Start: 2023-01-17

## 2023-01-17 RX ORDER — CETIRIZINE HYDROCHLORIDE 10 MG/1
10 TABLET ORAL DAILY
Qty: 30 TABLET | Refills: 0 | Status: SHIPPED | OUTPATIENT
Start: 2023-01-17 | End: 2023-02-16

## 2023-01-17 ASSESSMENT — ENCOUNTER SYMPTOMS
CHANGE IN BOWEL HABIT: 0
COUGH: 1
EYE ITCHING: 0
ABDOMINAL PAIN: 0
EYE PAIN: 0
RHINORRHEA: 0
NAUSEA: 0
DIARRHEA: 0
VOMITING: 0
SORE THROAT: 1

## 2023-01-18 DIAGNOSIS — J02.9 SORE THROAT: ICD-10-CM

## 2023-01-18 LAB
DIRECT EXAM: NORMAL
SPECIMEN DESCRIPTION: NORMAL

## 2023-01-18 NOTE — PROGRESS NOTES
[address] [address]  Dept: [phone]  Dept Fax: [phone]    Melissa Ayala is a 32 y.o. female who presents to the urgent care today for her medical conditions/complaints as notedbelow. Melissa Ayala is c/o of Pharyngitis (Hurts to swallow this started 3 nights ago. ) and Otalgia (Bilateral, ringing and burning pain. This has been going on for about 2 weeks. )      HPI:     Pharyngitis  This is a new problem. Episode onset: in the past 3 days. The problem occurs constantly. The problem has been gradually worsening. Associated symptoms include coughing, fatigue and a sore throat. Pertinent negatives include no abdominal pain, change in bowel habit, chills, congestion, fever, headaches, myalgias, nausea, rash or vomiting. Nothing aggravates the symptoms. She has tried NSAIDs for the symptoms. The treatment provided mild relief. Otalgia   There is pain in both ears. This is a new problem. The current episode started 1 to 4 weeks ago (in the past 2 weeks). The problem occurs constantly. The problem has been gradually worsening. There has been no fever. Associated symptoms include coughing and a sore throat. Pertinent negatives include no abdominal pain, diarrhea, headaches, rash, rhinorrhea or vomiting. She has tried NSAIDs for the symptoms. The treatment provided no relief. No past medical history on file.      Current Outpatient Medications   Medication Sig Dispense Refill    cetirizine (ZYRTEC) 10 MG tablet Take 1 tablet by mouth daily 30 tablet 0    fluticasone (FLONASE) 50 MCG/ACT nasal spray 2 sprays by Each Nostril route daily 16 g 0    miSOPROStol (CYTOTEC) 100 MCG tablet Take 8 tablets by mouth once for 1 dose Incomplete miscarriage (Patient not taking: No sig reported) 8 tablet 0    acetaminophen (TYLENOL) 500 MG tablet Take 2 tablets by mouth 3 times daily (Patient not taking: Reported on 1/17/2023) 40 tablet 0     No current facility-administered medications for this visit. No Known Allergies    Subjective:      Review of Systems   Constitutional:  Positive for fatigue. Negative for chills and fever. HENT:  Positive for ear pain and sore throat. Negative for congestion and rhinorrhea. Eyes:  Negative for pain and itching. Respiratory:  Positive for cough. Gastrointestinal:  Negative for abdominal pain, change in bowel habit, diarrhea, nausea and vomiting. Musculoskeletal:  Negative for gait problem and myalgias. Skin:  Negative for rash. Neurological:  Negative for dizziness and headaches. All other systems reviewed and are negative. 14 systems reviewed and negative except as listed in HPI. Objective:     Physical Exam  Vitals reviewed. Constitutional:       General: She is not in acute distress. Appearance: Normal appearance. She is not ill-appearing, toxic-appearing or diaphoretic. HENT:      Head: Normocephalic and atraumatic. Right Ear: External ear normal. Tenderness present. A middle ear effusion is present. There is no impacted cerumen. Tympanic membrane is not injected or erythematous. Left Ear: External ear normal. Tenderness present. A middle ear effusion is present. There is no impacted cerumen. Tympanic membrane is not injected or erythematous. Nose: Nasal tenderness and congestion present. No rhinorrhea. Right Sinus: No maxillary sinus tenderness or frontal sinus tenderness. Left Sinus: No maxillary sinus tenderness or frontal sinus tenderness. Mouth/Throat:      Lips: Pink. Mouth: Mucous membranes are moist.      Pharynx: Oropharynx is clear. Posterior oropharyngeal erythema present. No pharyngeal swelling or oropharyngeal exudate. Eyes:      General:         Right eye: No discharge. Left eye: No discharge. Extraocular Movements: Extraocular movements intact. Conjunctiva/sclera: Conjunctivae normal.      Pupils: Pupils are equal, round, and reactive to light. Cardiovascular:      Rate and Rhythm: Normal rate and regular rhythm. Heart sounds: Normal heart sounds. No friction rub. Pulmonary:      Effort: Pulmonary effort is normal. No respiratory distress. Breath sounds: Normal breath sounds. No stridor. No wheezing, rhonchi or rales. Musculoskeletal:         General: No swelling or tenderness. Normal range of motion. Cervical back: Normal range of motion and neck supple. No rigidity. Lymphadenopathy:      Cervical: No cervical adenopathy. Skin:     General: Skin is dry. Capillary Refill: Capillary refill takes less than 2 seconds. Findings: No erythema or rash. Neurological:      Mental Status: She is alert and oriented to person, place, and time. Motor: No weakness. Coordination: Coordination normal.      Gait: Gait normal.   Psychiatric:         Mood and Affect: Mood normal.         Behavior: Behavior normal.         Thought Content: Thought content normal.         Judgment: Judgment normal.     /85 (Site: Left Upper Arm, Position: Sitting, Cuff Size: Large Adult)   Pulse 93   Temp 98.2 °F (36.8 °C) (Temporal)   Ht 5' 2\" (1.575 m)   Wt 190 lb (86.2 kg)   LMP 01/10/2023 (Approximate)   SpO2 100%   Breastfeeding Unknown   BMI 34.75 kg/m²     Assessment:       Diagnosis Orders   1. Fluid level behind tympanic membrane of both ears  cetirizine (ZYRTEC) 10 MG tablet    fluticasone (FLONASE) 50 MCG/ACT nasal spray      2. Sore throat  POCT rapid strep A    Strep A DNA probe, amplification      3.  Nasal congestion  cetirizine (ZYRTEC) 10 MG tablet    fluticasone (FLONASE) 50 MCG/ACT nasal spray        Results for POC orders placed in visit on 01/17/23   POCT rapid strep A   Result Value Ref Range    Strep A Ag None Detected None Detected       Plan:   -Patient is well appearing  -Based on Hx and clinical exam findings, will treat as viral at this time  -Strep DNA probe sent, will call with results and add antibiotics if necessary  -Advised to continue with symptomatic treatment.  -Use acetaminophen or ibuprofen for fever, sore throat, or muscle aches.  -Recommend using a cool-mist humidifier.  -Based on the physical exam findings-- I believe the symptoms are related to OME. -Flonase daily recommended. -Zyrtec daily  -Educational materials provided on AVS.    Return if symptoms worsen or fail to improve. Orders Placed This Encounter   Medications    cetirizine (ZYRTEC) 10 MG tablet     Sig: Take 1 tablet by mouth daily     Dispense:  30 tablet     Refill:  0    fluticasone (FLONASE) 50 MCG/ACT nasal spray     Si sprays by Each Nostril route daily     Dispense:  16 g     Refill:  0           Patient given educational materials - see patient instructions. Discussed use, benefit, and side effects of prescribed medications. All patient questions answered. Pt voiced understanding.     Electronically signed by MACK Da Silva NP on 2023 at 7:35 PM

## 2023-01-21 ENCOUNTER — APPOINTMENT (OUTPATIENT)
Dept: CT IMAGING | Age: 28
End: 2023-01-21
Payer: MEDICARE

## 2023-01-21 ENCOUNTER — HOSPITAL ENCOUNTER (EMERGENCY)
Age: 28
Discharge: HOME | End: 2023-01-22
Attending: EMERGENCY MEDICINE
Payer: MEDICARE

## 2023-01-21 VITALS
HEART RATE: 81 BPM | OXYGEN SATURATION: 99 % | DIASTOLIC BLOOD PRESSURE: 74 MMHG | TEMPERATURE: 98.4 F | SYSTOLIC BLOOD PRESSURE: 123 MMHG | RESPIRATION RATE: 16 BRPM

## 2023-01-21 DIAGNOSIS — K08.89 PAIN, DENTAL: ICD-10-CM

## 2023-01-21 DIAGNOSIS — H65.93 BILATERAL SEROUS OTITIS MEDIA, UNSPECIFIED CHRONICITY: ICD-10-CM

## 2023-01-21 DIAGNOSIS — M54.2 NECK PAIN: Primary | ICD-10-CM

## 2023-01-21 PROCEDURE — 99285 EMERGENCY DEPT VISIT HI MDM: CPT

## 2023-01-21 PROCEDURE — 6370000000 HC RX 637 (ALT 250 FOR IP)

## 2023-01-21 PROCEDURE — 6360000004 HC RX CONTRAST MEDICATION

## 2023-01-21 PROCEDURE — 96374 THER/PROPH/DIAG INJ IV PUSH: CPT

## 2023-01-21 PROCEDURE — 70491 CT SOFT TISSUE NECK W/DYE: CPT

## 2023-01-21 RX ORDER — ACETAMINOPHEN 500 MG
1000 TABLET ORAL ONCE
Status: COMPLETED | OUTPATIENT
Start: 2023-01-21 | End: 2023-01-21

## 2023-01-21 RX ADMIN — ACETAMINOPHEN 1000 MG: 500 TABLET ORAL at 23:09

## 2023-01-21 RX ADMIN — IOPAMIDOL 75 ML: 755 INJECTION, SOLUTION INTRAVENOUS at 23:55

## 2023-01-22 PROCEDURE — 6370000000 HC RX 637 (ALT 250 FOR IP)

## 2023-01-22 PROCEDURE — 6360000002 HC RX W HCPCS

## 2023-01-22 RX ORDER — DEXAMETHASONE SODIUM PHOSPHATE 4 MG/ML
4 INJECTION, SOLUTION INTRA-ARTICULAR; INTRALESIONAL; INTRAMUSCULAR; INTRAVENOUS; SOFT TISSUE ONCE
Status: COMPLETED | OUTPATIENT
Start: 2023-01-22 | End: 2023-01-22

## 2023-01-22 RX ORDER — AMOXICILLIN AND CLAVULANATE POTASSIUM 875; 125 MG/1; MG/1
1 TABLET, FILM COATED ORAL 2 TIMES DAILY
Qty: 14 TABLET | Refills: 0 | Status: SHIPPED | OUTPATIENT
Start: 2023-01-22 | End: 2023-01-29

## 2023-01-22 RX ORDER — AMOXICILLIN AND CLAVULANATE POTASSIUM 250; 125 MG/1; MG/1
1 TABLET, FILM COATED ORAL ONCE
Status: DISCONTINUED | OUTPATIENT
Start: 2023-01-22 | End: 2023-01-22

## 2023-01-22 RX ORDER — AMOXICILLIN AND CLAVULANATE POTASSIUM 875; 125 MG/1; MG/1
1 TABLET, FILM COATED ORAL ONCE
Status: COMPLETED | OUTPATIENT
Start: 2023-01-22 | End: 2023-01-22

## 2023-01-22 RX ORDER — ACETAMINOPHEN 500 MG
500 TABLET ORAL 4 TIMES DAILY PRN
Qty: 20 TABLET | Refills: 0 | Status: SHIPPED | OUTPATIENT
Start: 2023-01-22

## 2023-01-22 RX ADMIN — AMOXICILLIN AND CLAVULANATE POTASSIUM 1 TABLET: 875; 125 TABLET, FILM COATED ORAL at 01:38

## 2023-01-22 RX ADMIN — DEXAMETHASONE SODIUM PHOSPHATE 4 MG: 4 INJECTION, SOLUTION INTRAMUSCULAR; INTRAVENOUS at 01:38

## 2023-01-22 NOTE — ED NOTES
Pt arrives to ED for ear pain/dental pain/ throat pain/ neck pain. Pt states it started with both her ears x2 months ago. Pt now has bilateral ear pain, left lower dental pain, throat pain, sinus pressure, and right neck pain/swelling today. Pt has seen pcp, has been told she has sinusitis, received ibuprofen/tylenol/zyrtec with no relief. Pt denies CP/SOB. Pt A&Ox4, RR even and nonlabored, NAD.         Debibe Dwyer RN  01/21/23 8022

## 2023-01-22 NOTE — ED PROVIDER NOTES
[facility]  ED  Emergency Department Encounter  Emergency Medicine Resident     Pt Ceci Chapa  MRN: [id number]  [place] 1995  Date of evaluation: 1/21/23  PCP:  Almaz Momin DO  Note Started: 10:49 PM EST      CHIEF COMPLAINT       Chief Complaint   Patient presents with    Dental Pain    Otalgia    Neck Pain       HISTORY OF PRESENT ILLNESS  (Location/Symptom, Timing/Onset, Context/Setting, Quality, Duration, Modifying Factors, Severity.)      Hilton Kwan is a 32 y.o. female who presents with left facial and right neck swelling. Patient has had pharyngitis and bilateral ear pain for the past 2 weeks which has acutely worsened over the past 2 days. Was seen in urgent care 4 days ago and told to continue Tylenol and Motrin for symptomatic treatment. Patient denies any shortness of breath but is endorsing some pain with swallowing. Denies fever or chills. No recent dental work done. No history of thyroid disease. No trauma to the face or neck. Denies any numbness, tingling, weakness. Denies any runny nose, watery eyes, dry mouth. Endorses some mild changes to her hearing with periods of dullness and hypersensitivity, left more than right. PAST MEDICAL / SURGICAL / SOCIAL / FAMILY HISTORY     No significant medical issues     has a past surgical history that includes Tonsillectomy and Cystocopy.     Social History     Socioeconomic History    Marital status: Single     Spouse name: Not on file    Number of children: Not on file    Years of education: Not on file    Highest education level: Not on file   Occupational History    Not on file   Tobacco Use    Smoking status: Every Day     Packs/day: 0.50     Types: Cigarettes    Smokeless tobacco: Never   Vaping Use    Vaping Use: Never used   Substance and Sexual Activity    Alcohol use: No    Drug use: No    Sexual activity: Yes     Partners: Male   Other Topics Concern    Not on file   Social History Narrative    Not on file     Social Determinants of Health     Financial Resource Strain: Low Risk     Difficulty of Paying Living Expenses: Not hard at all   Food Insecurity: No Food Insecurity    Worried About Running Out of Food in the Last Year: Never true    Ran Out of Food in the Last Year: Never true   Transportation Needs: No Transportation Needs    Lack of Transportation (Medical): No    Lack of Transportation (Non-Medical): No   Physical Activity: Not on file   Stress: Not on file   Social Connections: Not on file   Intimate Partner Violence: Not on file   Housing Stability: Not on file       Family History   Problem Relation Age of Onset    Heart Disease Father     Asthma Brother        Allergies:  Patient has no known allergies.    Home Medications:  Prior to Admission medications    Medication Sig Start Date End Date Taking? Authorizing Provider   acetaminophen (TYLENOL) 500 MG tablet Take 1 tablet by mouth 4 times daily as needed for Pain 1/22/23  Yes Brian Garcia MD   amoxicillin-clavulanate (AUGMENTIN) 875-125 MG per tablet Take 1 tablet by mouth 2 times daily for 7 days 1/22/23 1/29/23 Yes Brian Garcia MD   cetirizine (ZYRTEC) 10 MG tablet Take 1 tablet by mouth daily 1/17/23 2/16/23  MACK Veras NP   fluticasone (FLONASE) 50 MCG/ACT nasal spray 2 sprays by Each Nostril route daily 1/17/23   MACK Veras NP       REVIEW OF SYSTEMS       As stated in HPI    PHYSICAL EXAM      INITIAL VITALS:   /74   Pulse 81   Temp 98.4 °F (36.9 °C) (Oral)   Resp 16   LMP 01/10/2023 (Approximate)   SpO2 99%     Physical Exam  Vitals reviewed.   Constitutional:       Appearance: Normal appearance.   HENT:      Head: Normocephalic and atraumatic.      Ears:      Comments: Bilateral serous effusion with possible bulging on the left, no erythema, no discharge, no tympanic perforation     Nose: Nose normal.      Mouth/Throat:      Mouth: Mucous membranes are moist.      Pharynx:  Oropharynx is clear. No oropharyngeal exudate or posterior oropharyngeal erythema. Comments: No sialolith present, no erythema or swelling around the gum lines, no area of fluctuance, no significant sensitivity to tapping with tongue depressor, no notable swelling in either cheek. Voice normal  Eyes:      Extraocular Movements: Extraocular movements intact. Pupils: Pupils are equal, round, and reactive to light. Neck:      Vascular: No carotid bruit. Comments: Range of motion limited due to pain with horizontal rotation as well neck extension  Cardiovascular:      Rate and Rhythm: Regular rhythm. Tachycardia present. Pulses: Normal pulses. Pulmonary:      Effort: Pulmonary effort is normal.      Breath sounds: Normal breath sounds. Musculoskeletal:      Cervical back: Tenderness present. Lymphadenopathy:      Cervical: No cervical adenopathy. Skin:     General: Skin is warm and dry. Capillary Refill: Capillary refill takes less than 2 seconds. Neurological:      Mental Status: She is alert and oriented to person, place, and time. DDX/DIAGNOSTIC RESULTS / EMERGENCY DEPARTMENT COURSE / MDM     Medical Decision Making  80-year-old female, history of poor dentition, 2 weeks of worsening pharyngitis and facial swelling, no shortness of breath, pain or swelling, afebrile vital signs mildly tachycardic but otherwise within normal limits, left zygomatic swelling and tenderness, right neck tenderness and stiffness, restricted neck extension, bilateral serous effusion. No changes to voice or fever, no mastoid tenderness, however restricted neck extension concerning for retropharyngeal abscess in setting of recent pharyngitis, evidence of sinus infection, and new neck stiffness and facial swelling. We will plan to get CT soft tissue neck with contrast and treat pain to evaluate for RPA versus laryngitis versus PTA.     Amount and/or Complexity of Data Reviewed  External Data Reviewed: labs. Details: TSH within normal limits in 2020  urgent care visit 1/17 for pharyngitis  Radiology: ordered. Decision-making details documented in ED Course. Details: CT neck ST    Risk  OTC drugs. Prescription drug management. EMERGENCY DEPARTMENT COURSE:    ED Course as of 01/22/23 2208   Ronel Norwood Jan 22, 2023   0006 Per my read, no evidence of retropharyngeal abscess on CT scan. No other gross abnormalities present. We will await formal read. [DH]      ED Course User Index  [DH] Rina Prince MD   CT showed no evidence of infection, significant swelling, or other concerning findings. Will plan to give patient dose of decadron and send home with course of augmentin for concern of occult infection. Discussed S/S to look out for necessitating return to ED. She expressed verbal understanding and is ok with this plan. Medically stable at time of DC. FINAL IMPRESSION      1. Neck pain    2. Bilateral serous otitis media, unspecified chronicity    3.  Pain, dental          DISPOSITION / PLAN     DISPOSITION Decision To Discharge 01/22/2023 01:52:18 AM      PATIENT REFERRED TO:  Almaz Momin DO  [address] [facility] [address]  [phone]    Go to   Previously scheduled appointment    [facility]  [address] [facility] [id number] [phone]  Go to   If symptoms worsen    DISCHARGE MEDICATIONS:  Discharge Medication List as of 1/22/2023  1:18 AM        START taking these medications    Details   amoxicillin-clavulanate (AUGMENTIN) 875-125 MG per tablet Take 1 tablet by mouth 2 times daily for 7 days, Disp-14 tablet, R-0Print             Rina Prince MD  Emergency Medicine Resident    (Please note that portions of thisnote were completed with a voice recognition program.  Efforts were made to edit the dictations but occasionally words are mis-transcribed.)        Rina Prince MD  Resident  01/22/23 3032

## 2023-01-22 NOTE — ED PROVIDER NOTES
[address] [facility]     Emergency Department     Faculty Attestation    I performed a history and physical examination of the patient and discussed management with the resident. I reviewed the residents note and agree with the documented findings and plan of care. Any areas of disagreement are noted on the chart. I was personally present for the key portions of any procedures. I have documented in the chart those procedures where I was not present during the key portions. I have reviewed the emergency nurses triage note. I agree with the chief complaint, past medical history, past surgical history, allergies, medications, social and family history as documented unless otherwise noted below. For Physician Assistant/ Nurse Practitioner cases/documentation I have personally evaluated this patient and have completed at least one if not all key elements of the E/M (history, physical exam, and MDM). Additional findings are as noted. I have personally seen and evaluated the patient. I find the patient's history and physical exam are consistent with the NP/PA documentation. I agree with the care provided, treatment rendered, disposition and follow-up plan.    32 y.o. female presenting with several months of ear pain and 2 weeks of neck pain. No difficulty speaking, bit has pain with swallowing. PCP treated for sinusitis with NSAIDs and allergy medications. Today noticed swelling over her left zygoma. No trauma. Pain also in her neck, bilaterally. Exam:  General : Laying on the bed, awake, alert, and in no acute distress  CV : normal rate and regular rhythm  Lungs : Breathing comfortably on room air with no tachypnea, hypoxia, or increased work of breathing  HEENT: TMs clear bilaterally. No canal erythema. No mastoid tenderness to palpation. No palpable LAD. Slight swelling over the L zygoma. EOMs intact without pain. No nuchal rigidity. Mild tenderness over the SCM bilaterally.     Plan:  CT soft tissue to rule out deep space infection such as RPA or PTA. No fever or nuchal rigidity to suggest meningitis. CT is negative, but with soft tissue swelling in the face, will treat with augmentin for preseptal cellulitis. Follow up with PCP.     Will Stephen MD   Attending Emergency Physician    (Please note that portions of this note were completed with a voice recognition program. Efforts were made to edit the dictations but occasionally words are mis-transcribed.)           Will Stephen MD  01/22/23 5746

## 2023-01-22 NOTE — DISCHARGE INSTRUCTIONS
You are seen and evaluated for facial swelling and neck pain.  Your CT scan showed no concerning etiology.  You are started on a course of antibiotics which you should continue until they are gone even if you begin to feel better.  You are also given a single dose of steroids to help with the swelling and pain in the immediate setting.  You been prescribed Tylenol which you should take as prescribed.  You may also take over-the-counter ibuprofen as we discussed as this will not interfere with these medications.    If you begin to experience worsening shortness of breath, difficulty breathing, difficulty managing your saliva, you should return to the emergency department for further evaluation.

## 2023-01-22 NOTE — ED NOTES
Pt A&Ox4, RR even and nonlabored, NAD. Pt laying on cot, on phone. Pt received warm blankets. Pt denies other needs at this time, will continue to monitor. Brother at bedside.      Deysi Nguyen RN  01/21/23 4821

## 2023-02-07 ENCOUNTER — OFFICE VISIT (OUTPATIENT)
Dept: OBGYN CLINIC | Age: 28
End: 2023-02-07
Payer: MEDICAID

## 2023-02-07 VITALS
SYSTOLIC BLOOD PRESSURE: 126 MMHG | WEIGHT: 195 LBS | BODY MASS INDEX: 35.88 KG/M2 | HEART RATE: 99 BPM | HEIGHT: 62 IN | DIASTOLIC BLOOD PRESSURE: 82 MMHG

## 2023-02-07 DIAGNOSIS — E04.1 THYROID NODULE: ICD-10-CM

## 2023-02-07 DIAGNOSIS — R13.10 DYSPHAGIA, UNSPECIFIED TYPE: ICD-10-CM

## 2023-02-07 DIAGNOSIS — N92.6 IRREGULAR MENSES: ICD-10-CM

## 2023-02-07 DIAGNOSIS — Z30.09 BIRTH CONTROL COUNSELING: ICD-10-CM

## 2023-02-07 DIAGNOSIS — Z01.419 WELL WOMAN EXAM: Primary | ICD-10-CM

## 2023-02-07 DIAGNOSIS — Z11.51 SCREENING FOR HPV (HUMAN PAPILLOMAVIRUS): ICD-10-CM

## 2023-02-07 DIAGNOSIS — N64.4 BREAST TENDERNESS IN FEMALE: ICD-10-CM

## 2023-02-07 DIAGNOSIS — Z32.02 NEGATIVE PREGNANCY TEST: ICD-10-CM

## 2023-02-07 DIAGNOSIS — Z30.011 ENCOUNTER FOR BCP (BIRTH CONTROL PILLS) INITIAL PRESCRIPTION: ICD-10-CM

## 2023-02-07 DIAGNOSIS — N63.25 BREAST LUMP ON LEFT SIDE AT 6 O'CLOCK POSITION: ICD-10-CM

## 2023-02-07 LAB
CONTROL: NORMAL
PREGNANCY TEST URINE, POC: NEGATIVE

## 2023-02-07 PROCEDURE — 99395 PREV VISIT EST AGE 18-39: CPT | Performed by: CLINICAL NURSE SPECIALIST

## 2023-02-07 PROCEDURE — 81025 URINE PREGNANCY TEST: CPT | Performed by: CLINICAL NURSE SPECIALIST

## 2023-02-07 RX ORDER — LEVONORGESTREL AND ETHINYL ESTRADIOL 0.1-0.02MG
1 KIT ORAL DAILY
Qty: 1 PACKET | Refills: 2 | Status: SHIPPED | OUTPATIENT
Start: 2023-02-07

## 2023-02-07 SDOH — ECONOMIC STABILITY: FOOD INSECURITY: WITHIN THE PAST 12 MONTHS, THE FOOD YOU BOUGHT JUST DIDN'T LAST AND YOU DIDN'T HAVE MONEY TO GET MORE.: NEVER TRUE

## 2023-02-07 SDOH — ECONOMIC STABILITY: INCOME INSECURITY: HOW HARD IS IT FOR YOU TO PAY FOR THE VERY BASICS LIKE FOOD, HOUSING, MEDICAL CARE, AND HEATING?: NOT HARD AT ALL

## 2023-02-07 SDOH — ECONOMIC STABILITY: HOUSING INSECURITY
IN THE LAST 12 MONTHS, WAS THERE A TIME WHEN YOU DID NOT HAVE A STEADY PLACE TO SLEEP OR SLEPT IN A SHELTER (INCLUDING NOW)?: NO

## 2023-02-07 SDOH — ECONOMIC STABILITY: FOOD INSECURITY: WITHIN THE PAST 12 MONTHS, YOU WORRIED THAT YOUR FOOD WOULD RUN OUT BEFORE YOU GOT MONEY TO BUY MORE.: NEVER TRUE

## 2023-02-07 ASSESSMENT — PATIENT HEALTH QUESTIONNAIRE - PHQ9
SUM OF ALL RESPONSES TO PHQ QUESTIONS 1-9: 0
1. LITTLE INTEREST OR PLEASURE IN DOING THINGS: 0
SUM OF ALL RESPONSES TO PHQ9 QUESTIONS 1 & 2: 0
2. FEELING DOWN, DEPRESSED OR HOPELESS: 0
SUM OF ALL RESPONSES TO PHQ QUESTIONS 1-9: 0

## 2023-02-07 NOTE — PROGRESS NOTES
[facility] [address] [facility]  [address] [address]  Dept: [phone]        DATE OF VISIT:  23        History and Physical    Dania Kim    :  1995  CHIEF COMPLAINT:    Chief Complaint   Patient presents with    Annual Exam                    Dania Kim is a 32 y.o. female who presents for annual well woman exam.  Patient reports that since nov. Her bleeding goes from spotting to heavy flow bright red. In dec. She bled most of the month only 4 days free of bleeding. In  She bled most of the month then stopped then began to have bleeding water pink discharge. The patient was seen and examined. Per the patient bowels areregular. She has no voiding complaints. She denies any bloating as well as vaginal discharge. Chaperone for Intimate Exam  Chaperone was offered as part of the rooming process. Patient declined and agrees to continue with exam without a chaperone. Chaperone: none     _____________________________________________________________________  History reviewed. No pertinent past medical history.                                                                 Past Surgical History:   Procedure Laterality Date    CYSTOSCOPY      DILATION AND CURETTAGE      TONSILLECTOMY       Family History   Problem Relation Age of Onset    Heart Disease Father     Asthma Brother      Social History     Tobacco Use   Smoking Status Every Day    Packs/day: 0.50    Types: Cigarettes   Smokeless Tobacco Never     Social History     Substance and Sexual Activity   Alcohol Use No     Current Outpatient Medications   Medication Sig Dispense Refill    Levonorgestrel-Ethinyl Estrad (TYBLUME) 0.1-20 MG-MCG CHEW Take 1 tablet by mouth daily 1 packet 2    acetaminophen (TYLENOL) 500 MG tablet Take 1 tablet by mouth 4 times daily as needed for Pain 20 tablet 0    cetirizine (ZYRTEC) 10 MG tablet Take 1 tablet by mouth daily 30 tablet 0    fluticasone (FLONASE) 50 MCG/ACT nasal spray 2 sprays by Each Nostril route daily 16 g 0     No current facility-administered medications for this visit. Allergies:  No Known Allergies    Gynecologic History:  Patient's last menstrual period was 2023 (approximate). Sexually Active: No  STD History:Yes chlamydia  Birth Control: No    OB History    Para Term  AB Living   4 3 3 0 0 3   SAB IAB Ectopic Molar Multiple Live Births   0 0 0 0 0 3     ______________________________________________________________________    Review of Systems    REVIEW OFSYSTEMS:        Constitutional:  Unexpected weight change, extreme fatigue, night sweats              no  Skin:                           Rashes, moles   no  Neurological:  Frequentheadaches, seizures         no  Ophthalmic:  Recent visual changes at times left eye gets blurry yes  ENT:   Difficulty swallowing  no  Breast:              Masses, pain on the left side mainly when laying on side, nipple discharge                            yes     Respiratory:  Shortness of breath, coughing smoker          yes    Cardiovascular: Chest pain   no     Gastrointestinal: Chronic diarrhea/constipation, nausea/vomiting           no   Urogenital:  Urinary incontinence, frequency, urgency          no                                         Heavy/irregular periods           yes                                      Vaginal discharge       pink            yes  Hematological: Bruises easy   no     Endocrine:  Hot flashes rarely  yes     Hot/Cold Intolerance  no    Psychological:            Mood and affect were within normal limits. yes                 Physical Exam    Physical Exam:    Vitals:    23 0933   BP: 126/82   Pulse: 99   Weight: 195 lb (88.5 kg)   Height: 5' 2\" (1.575 m)       General Appearance: This  is a well developed, well nourished, well groomed female. Her BMI was reviewed.  Nutritional decision making andexercise were discussed. Neurological:  The patient is alert and oriented to time,place, person, and situation    Skin:  A brief inspection of the skin revealed no rashes or lesions. Neck:  The neck was supple. Nodule on the right side. Respiratory: There was unlabored respiratory effort. Lungs clear to ascultation. Cardiovascular: The patients extremities were without calf tenderness or edema. Heart with a regular rate and rhythm. Abdomen: The abdomen was soft and non-tender with no guarding, rebound or rigidity. No hernias were appreciated. Breast:   The patients breasts were symmetrical.  There were no masses, discharge or retractions noted on the right breast, she did have lump at 6 oclock on the left with tenderness, no discharge or retractions noted. Self breast exams were reviewed. Pelvic Exam:  The external genitalia was with a normal appearance. The vaginal vault was normal. There were no cystocele, rectocele, or enterocele appreciated. There was no vaginal discharge. The cervix was without lesions. There was no cervical motion tenderness. The uterus was mobile, midline and regular. The adnexa no fullness, tenderness or masses appreciated. ASSESSMENT: Normal annual well woman exam.  Thyroid nodule noted on the right side. 32 y.o. Female; Annual   Diagnosis Orders   1. Well woman exam  PAP SMEAR      2. Screening for HPV (human papillomavirus)  PAP SMEAR      3. Irregular menses  Levonorgestrel-Ethinyl Estrad (TYBLUME) 0.1-20 MG-MCG CHEW      4. Negative pregnancy test  POCT urine pregnancy      5. Birth control counseling  Levonorgestrel-Ethinyl Estrad (TYBLUME) 0.1-20 MG-MCG CHEW      6. Breast lump on left side at 6 o'clock position  US BREAST COMPLETE LEFT      7. Breast tenderness in female  US BREAST COMPLETE LEFT      8. Thyroid nodule        9. Dysphagia, unspecified type        10.  Encounter for BCP (birth control pills) initial prescription        Patient reports that she has an appt with her PCP Dr. Sandy May on Feb. 28th she was instructed to keep that appt. And discuss her nodule with him. PLAN:  - Discussed new papsmear guidelines. - Birth control Discussed. Discussed with patient birth control pill risks, side effects and use and patient verbalized understanding  ACHES reviewed. - Smoking risk factors Discussed  - Diet and exercise reviewed. - Routine healthmaintenance per patients PCP.  - Return to clinic in 1 year or earlier with questions, problems, concerns. Return in about 3 months (around 5/7/2023) for med ck.         Electronically signed by MACK Restrepo CNP on 2/7/2023 at 10:49 AM

## 2023-02-08 ENCOUNTER — HOSPITAL ENCOUNTER (OUTPATIENT)
Age: 28
Setting detail: SPECIMEN
Discharge: HOME | End: 2023-02-08

## 2023-02-10 ENCOUNTER — HOSPITAL ENCOUNTER (OUTPATIENT)
Dept: WOMENS IMAGING | Age: 28
Discharge: HOME | End: 2023-02-10
Payer: MEDICAID

## 2023-02-10 DIAGNOSIS — N63.25 BREAST LUMP ON LEFT SIDE AT 6 O'CLOCK POSITION: ICD-10-CM

## 2023-02-10 DIAGNOSIS — N64.4 BREAST TENDERNESS IN FEMALE: ICD-10-CM

## 2023-02-10 PROCEDURE — 76642 ULTRASOUND BREAST LIMITED: CPT

## 2023-04-22 ENCOUNTER — HOSPITAL ENCOUNTER (OUTPATIENT)
Age: 28
Discharge: HOME | End: 2023-04-22
Payer: MEDICAID

## 2023-04-22 LAB
ABSOLUTE EOS #: 0.18 K/UL (ref 0–0.44)
ABSOLUTE IMMATURE GRANULOCYTE: 0.05 K/UL (ref 0–0.3)
ABSOLUTE LYMPH #: 3.13 K/UL (ref 1.1–3.7)
ABSOLUTE MONO #: 0.63 K/UL (ref 0.1–1.2)
ALBUMIN SERPL-MCNC: 3.9 G/DL (ref 3.5–5.2)
ALBUMIN/GLOBULIN RATIO: 1.4 (ref 1–2.5)
ALP SERPL-CCNC: 85 U/L (ref 35–104)
ALT SERPL-CCNC: 22 U/L (ref 5–33)
ANION GAP SERPL CALCULATED.3IONS-SCNC: 12 MMOL/L (ref 9–17)
AST SERPL-CCNC: 18 U/L
BASOPHILS # BLD: 1 % (ref 0–2)
BASOPHILS ABSOLUTE: 0.06 K/UL (ref 0–0.2)
BILIRUB SERPL-MCNC: 0.4 MG/DL (ref 0.3–1.2)
BILIRUBIN URINE: NEGATIVE
BUN SERPL-MCNC: 11 MG/DL (ref 6–20)
CALCIUM SERPL-MCNC: 8.9 MG/DL (ref 8.6–10.4)
CASTS UA: NORMAL /LPF (ref 0–8)
CHLORIDE SERPL-SCNC: 108 MMOL/L (ref 98–107)
CO2 SERPL-SCNC: 20 MMOL/L (ref 20–31)
COLOR: YELLOW
CREAT SERPL-MCNC: 0.5 MG/DL (ref 0.5–0.9)
EOSINOPHILS RELATIVE PERCENT: 2 % (ref 1–4)
EPITHELIAL CELLS UA: NORMAL /HPF (ref 0–5)
GFR SERPL CREATININE-BSD FRML MDRD: >60 ML/MIN/1.73M2
GLUCOSE SERPL-MCNC: 85 MG/DL (ref 70–99)
GLUCOSE UR STRIP.AUTO-MCNC: NEGATIVE MG/DL
HCT VFR BLD AUTO: 41.4 % (ref 36.3–47.1)
HGB BLD-MCNC: 13.1 G/DL (ref 11.9–15.1)
IMMATURE GRANULOCYTES: 1 %
KETONES UR STRIP.AUTO-MCNC: NEGATIVE MG/DL
LEUKOCYTE ESTERASE UR QL STRIP.AUTO: NEGATIVE
LYMPHOCYTES # BLD: 28 % (ref 24–43)
MAGNESIUM SERPL-MCNC: 1.8 MG/DL (ref 1.6–2.6)
MCH RBC QN AUTO: 28.4 PG (ref 25.2–33.5)
MCHC RBC AUTO-ENTMCNC: 31.6 G/DL (ref 28.4–34.8)
MCV RBC AUTO: 89.8 FL (ref 82.6–102.9)
MONOCYTES # BLD: 6 % (ref 3–12)
NITRITE UR QL STRIP.AUTO: NEGATIVE
NRBC AUTOMATED: 0.2 PER 100 WBC
PDW BLD-RTO: 16.2 % (ref 11.8–14.4)
PLATELET # BLD AUTO: ABNORMAL K/UL (ref 138–453)
PLATELET, FLUORESCENCE: NORMAL K/UL (ref 138–453)
POTASSIUM SERPL-SCNC: 4.9 MMOL/L (ref 3.7–5.3)
PROT SERPL-MCNC: 6.7 G/DL (ref 6.4–8.3)
PROT UR STRIP.AUTO-MCNC: 5.5 MG/DL (ref 5–8)
PROT UR STRIP.AUTO-MCNC: NEGATIVE MG/DL
RBC # BLD: 4.61 M/UL (ref 3.95–5.11)
RBC # BLD: ABNORMAL 10*6/UL
RBC CLUMPS #/AREA URNS AUTO: NORMAL /HPF (ref 0–4)
SEG NEUTROPHILS: 63 % (ref 36–65)
SEGMENTED NEUTROPHILS ABSOLUTE COUNT: 7.02 K/UL (ref 1.5–8.1)
SODIUM SERPL-SCNC: 140 MMOL/L (ref 135–144)
SPECIFIC GRAVITY UA: 1.03 (ref 1–1.03)
T3FREE SERPL-MCNC: 3.39 PG/ML (ref 2.02–4.43)
T4 FREE SERPL-MCNC: 1.1 NG/DL (ref 0.9–1.7)
TSH SERPL-ACNC: 0.76 UIU/ML (ref 0.3–5)
TURBIDITY: ABNORMAL
URINE HGB: ABNORMAL
UROBILINOGEN, URINE: NORMAL
WBC # BLD AUTO: 11.1 K/UL (ref 3.5–11.3)
WBC UA: NORMAL /HPF (ref 0–5)

## 2023-04-22 PROCEDURE — 83036 HEMOGLOBIN GLYCOSYLATED A1C: CPT

## 2023-04-22 PROCEDURE — 36415 COLL VENOUS BLD VENIPUNCTURE: CPT

## 2023-04-22 PROCEDURE — 80053 COMPREHEN METABOLIC PANEL: CPT

## 2023-04-22 PROCEDURE — 81001 URINALYSIS AUTO W/SCOPE: CPT

## 2023-04-22 PROCEDURE — 84439 ASSAY OF FREE THYROXINE: CPT

## 2023-04-22 PROCEDURE — 84481 FREE ASSAY (FT-3): CPT

## 2023-04-22 PROCEDURE — 83735 ASSAY OF MAGNESIUM: CPT

## 2023-04-22 PROCEDURE — 85025 COMPLETE CBC W/AUTO DIFF WBC: CPT

## 2023-04-22 PROCEDURE — 84443 ASSAY THYROID STIM HORMONE: CPT

## 2023-04-22 PROCEDURE — 85055 RETICULATED PLATELET ASSAY: CPT

## 2023-04-26 LAB
EST. AVERAGE GLUCOSE BLD GHB EST-MCNC: 111 MG/DL
HBA1C MFR BLD: 5.5 % (ref 4–6)

## 2023-05-10 ENCOUNTER — OFFICE VISIT (OUTPATIENT)
Dept: OBGYN CLINIC | Age: 28
End: 2023-05-10
Payer: MEDICAID

## 2023-05-10 VITALS
HEART RATE: 84 BPM | BODY MASS INDEX: 35.7 KG/M2 | WEIGHT: 194 LBS | DIASTOLIC BLOOD PRESSURE: 84 MMHG | SYSTOLIC BLOOD PRESSURE: 118 MMHG | HEIGHT: 62 IN

## 2023-05-10 DIAGNOSIS — Z30.09 BIRTH CONTROL COUNSELING: ICD-10-CM

## 2023-05-10 DIAGNOSIS — N92.6 IRREGULAR MENSES: Primary | ICD-10-CM

## 2023-05-10 DIAGNOSIS — Z32.02 NEGATIVE PREGNANCY TEST: ICD-10-CM

## 2023-05-10 DIAGNOSIS — Z30.49 ENCOUNTER FOR INITIAL MANAGEMENT OF NUVARING: ICD-10-CM

## 2023-05-10 LAB
CONTROL: NORMAL
PREGNANCY TEST URINE, POC: NEGATIVE

## 2023-05-10 PROCEDURE — 99213 OFFICE O/P EST LOW 20 MIN: CPT | Performed by: CLINICAL NURSE SPECIALIST

## 2023-05-10 PROCEDURE — 81025 URINE PREGNANCY TEST: CPT | Performed by: CLINICAL NURSE SPECIALIST

## 2023-05-10 RX ORDER — ETONOGESTREL AND ETHINYL ESTRADIOL 11.7; 2.7 MG/1; MG/1
1 INSERT, EXTENDED RELEASE VAGINAL
Qty: 3 EACH | Refills: 0 | Status: SHIPPED | OUTPATIENT
Start: 2023-05-10

## 2023-05-10 ASSESSMENT — ENCOUNTER SYMPTOMS
EYES NEGATIVE: 1
GASTROINTESTINAL NEGATIVE: 1
ALLERGIC/IMMUNOLOGIC NEGATIVE: 1
RESPIRATORY NEGATIVE: 1

## 2023-05-10 NOTE — PROGRESS NOTES
Subjective:      Patient ID:  Dwayne Hendrix is a 32 y.o. female who presents for   Chief Complaint   Patient presents with    Medication Check       HPI    Patient is a 33 yo female who presents for birth control follow up for irregular menses. Patient states that she has not been taking the birth control, she never started it and never picked it up. Patient would like to try nuvaring because she thinks that this would be an easier method for her then trying to remember the pill every day. Patient reports that she has used nuvaring in the past and was successful with that method. Review of Systems   Constitutional:  Negative for chills and fever. HENT: Negative. Eyes: Negative. Respiratory: Negative. Cardiovascular: Negative. Gastrointestinal: Negative. Endocrine: Negative. Genitourinary:  Positive for menstrual problem (irregular menses). Negative for dysuria and vaginal discharge. Musculoskeletal: Negative. Skin: Negative. Allergic/Immunologic: Negative. Neurological: Negative. Hematological: Negative. Psychiatric/Behavioral: Negative. /84   Pulse 84   Ht 5' 2\" (1.575 m)   Wt 194 lb (88 kg)   LMP 05/04/2023   BMI 35.48 kg/m²    Patient's last menstrual period was 05/04/2023. Family History   Problem Relation Age of Onset    Heart Disease Father     Asthma Brother       History reviewed. No pertinent past medical history.    Past Surgical History:   Procedure Laterality Date    CYSTOSCOPY      DILATION AND CURETTAGE      TONSILLECTOMY        Social History     Socioeconomic History    Marital status: Single     Spouse name: None    Number of children: None    Years of education: None    Highest education level: None   Tobacco Use    Smoking status: Every Day     Packs/day: 0.50     Types: Cigarettes    Smokeless tobacco: Never   Vaping Use    Vaping Use: Never used   Substance and Sexual Activity    Alcohol use: No    Drug use: No    Sexual

## 2023-06-27 ENCOUNTER — HOSPITAL ENCOUNTER (OUTPATIENT)
Age: 28
Discharge: HOME | End: 2023-06-29
Payer: MEDICAID

## 2023-06-27 ENCOUNTER — HOSPITAL ENCOUNTER (OUTPATIENT)
Dept: GENERAL RADIOLOGY | Age: 28
Discharge: HOME | End: 2023-06-29
Payer: MEDICAID

## 2023-06-27 DIAGNOSIS — M54.50 PAIN, LUMBAR REGION: ICD-10-CM

## 2023-06-27 PROCEDURE — 72110 X-RAY EXAM L-2 SPINE 4/>VWS: CPT

## 2023-09-06 ENCOUNTER — OFFICE VISIT (OUTPATIENT)
Dept: OBGYN CLINIC | Age: 28
End: 2023-09-06
Payer: COMMERCIAL

## 2023-09-06 VITALS
HEIGHT: 62 IN | HEART RATE: 94 BPM | WEIGHT: 195 LBS | BODY MASS INDEX: 35.88 KG/M2 | DIASTOLIC BLOOD PRESSURE: 88 MMHG | SYSTOLIC BLOOD PRESSURE: 118 MMHG

## 2023-09-06 DIAGNOSIS — Z30.49 ENCOUNTER FOR SURVEILLANCE OF NUVARING: Primary | ICD-10-CM

## 2023-09-06 DIAGNOSIS — Z32.02 NEGATIVE PREGNANCY TEST: ICD-10-CM

## 2023-09-06 DIAGNOSIS — N92.6 IRREGULAR MENSES: ICD-10-CM

## 2023-09-06 LAB
CONTROL: NORMAL
PREGNANCY TEST URINE, POC: NEGATIVE

## 2023-09-06 PROCEDURE — G8417 CALC BMI ABV UP PARAM F/U: HCPCS | Performed by: CLINICAL NURSE SPECIALIST

## 2023-09-06 PROCEDURE — 99213 OFFICE O/P EST LOW 20 MIN: CPT | Performed by: CLINICAL NURSE SPECIALIST

## 2023-09-06 PROCEDURE — 81025 URINE PREGNANCY TEST: CPT | Performed by: CLINICAL NURSE SPECIALIST

## 2023-09-06 PROCEDURE — 4004F PT TOBACCO SCREEN RCVD TLK: CPT | Performed by: CLINICAL NURSE SPECIALIST

## 2023-09-06 PROCEDURE — G8427 DOCREV CUR MEDS BY ELIG CLIN: HCPCS | Performed by: CLINICAL NURSE SPECIALIST

## 2023-09-06 RX ORDER — ETONOGESTREL AND ETHINYL ESTRADIOL 11.7; 2.7 MG/1; MG/1
1 INSERT, EXTENDED RELEASE VAGINAL
Qty: 3 EACH | Refills: 2 | Status: SHIPPED | OUTPATIENT
Start: 2023-09-06

## 2023-09-06 ASSESSMENT — ENCOUNTER SYMPTOMS
ALLERGIC/IMMUNOLOGIC NEGATIVE: 1
EYES NEGATIVE: 1
GASTROINTESTINAL NEGATIVE: 1
RESPIRATORY NEGATIVE: 1

## 2024-10-04 ENCOUNTER — ANESTHESIA EVENT (OUTPATIENT)
Dept: OPERATING ROOM | Age: 29
End: 2024-10-04
Payer: COMMERCIAL

## 2024-10-04 ENCOUNTER — HOSPITAL ENCOUNTER (OUTPATIENT)
Age: 29
Discharge: HOME | End: 2024-10-04
Attending: EMERGENCY MEDICINE | Admitting: OBSTETRICS & GYNECOLOGY
Payer: COMMERCIAL

## 2024-10-04 ENCOUNTER — ANESTHESIA (OUTPATIENT)
Dept: OPERATING ROOM | Age: 29
End: 2024-10-04
Payer: COMMERCIAL

## 2024-10-04 ENCOUNTER — APPOINTMENT (OUTPATIENT)
Dept: ULTRASOUND IMAGING | Age: 29
End: 2024-10-04
Payer: COMMERCIAL

## 2024-10-04 VITALS
RESPIRATION RATE: 17 BRPM | OXYGEN SATURATION: 99 % | TEMPERATURE: 97.5 F | DIASTOLIC BLOOD PRESSURE: 56 MMHG | HEART RATE: 103 BPM | SYSTOLIC BLOOD PRESSURE: 108 MMHG

## 2024-10-04 DIAGNOSIS — G89.18 POSTOPERATIVE PAIN: ICD-10-CM

## 2024-10-04 DIAGNOSIS — O00.90 ECTOPIC PREGNANCY, UNSPECIFIED LOCATION, UNSPECIFIED WHETHER INTRAUTERINE PREGNANCY PRESENT: ICD-10-CM

## 2024-10-04 DIAGNOSIS — O00.90 RUPTURED ECTOPIC PREGNANCY: Primary | ICD-10-CM

## 2024-10-04 LAB
ABO + RH BLD: NORMAL
ALBUMIN SERPL-MCNC: 4.3 G/DL (ref 3.5–5.2)
ALBUMIN/GLOB SERPL: 2 {RATIO} (ref 1–2.5)
ALP SERPL-CCNC: 80 U/L (ref 35–104)
ALT SERPL-CCNC: 36 U/L (ref 10–35)
ANION GAP SERPL CALCULATED.3IONS-SCNC: 14 MMOL/L (ref 9–16)
ARM BAND NUMBER: NORMAL
AST SERPL-CCNC: 23 U/L (ref 10–35)
B-HCG SERPL EIA 3RD IS-ACNC: 8357 MIU/ML (ref 0–7)
BACTERIA URNS QL MICRO: NORMAL
BASOPHILS # BLD: 0.04 K/UL (ref 0–0.2)
BASOPHILS NFR BLD: 0 % (ref 0–2)
BILIRUB SERPL-MCNC: 0.5 MG/DL (ref 0–1.2)
BILIRUB UR QL STRIP: NEGATIVE
BLOOD BANK SAMPLE EXPIRATION: NORMAL
BLOOD GROUP ANTIBODIES SERPL: NEGATIVE
BUN SERPL-MCNC: 7 MG/DL (ref 6–20)
C TRACH DNA SPEC QL PROBE+SIG AMP: NORMAL
CALCIUM SERPL-MCNC: 9.3 MG/DL (ref 8.6–10.4)
CANDIDA SPECIES: NEGATIVE
CASTS #/AREA URNS LPF: NORMAL /LPF (ref 0–8)
CHLORIDE SERPL-SCNC: 103 MMOL/L (ref 98–107)
CLARITY UR: CLEAR
CO2 SERPL-SCNC: 20 MMOL/L (ref 20–31)
COLOR UR: YELLOW
CREAT SERPL-MCNC: 0.7 MG/DL (ref 0.5–0.9)
EOSINOPHIL # BLD: 0.18 K/UL (ref 0–0.44)
EOSINOPHILS RELATIVE PERCENT: 2 % (ref 1–4)
EPI CELLS #/AREA URNS HPF: NORMAL /HPF (ref 0–5)
ERYTHROCYTE [DISTWIDTH] IN BLOOD BY AUTOMATED COUNT: 14 % (ref 11.8–14.4)
GARDNERELLA VAGINALIS: NEGATIVE
GFR, ESTIMATED: >90 ML/MIN/1.73M2
GLUCOSE SERPL-MCNC: 146 MG/DL (ref 74–99)
GLUCOSE UR STRIP-MCNC: NEGATIVE MG/DL
HCG SERPL QL: POSITIVE
HCT VFR BLD AUTO: 44.8 % (ref 36.3–47.1)
HGB BLD-MCNC: 14.4 G/DL (ref 11.9–15.1)
HGB UR QL STRIP.AUTO: ABNORMAL
IMM GRANULOCYTES # BLD AUTO: 0.04 K/UL (ref 0–0.3)
IMM GRANULOCYTES NFR BLD: 0 %
INR PPP: 0.9
KETONES UR STRIP-MCNC: NEGATIVE MG/DL
LACTIC ACID, WHOLE BLOOD: 3.2 MMOL/L (ref 0.7–2.1)
LEUKOCYTE ESTERASE UR QL STRIP: NEGATIVE
LIPASE SERPL-CCNC: 21 U/L (ref 13–60)
LYMPHOCYTES NFR BLD: 3.72 K/UL (ref 1.1–3.7)
LYMPHOCYTES RELATIVE PERCENT: 34 % (ref 24–43)
MAGNESIUM SERPL-MCNC: 2 MG/DL (ref 1.6–2.6)
MCH RBC QN AUTO: 30.5 PG (ref 25.2–33.5)
MCHC RBC AUTO-ENTMCNC: 32.1 G/DL (ref 28.4–34.8)
MCV RBC AUTO: 94.9 FL (ref 82.6–102.9)
MONOCYTES NFR BLD: 0.5 K/UL (ref 0.1–1.2)
MONOCYTES NFR BLD: 5 % (ref 3–12)
N GONORRHOEA DNA SPEC QL PROBE+SIG AMP: NORMAL
NEUTROPHILS NFR BLD: 59 % (ref 36–65)
NEUTS SEG NFR BLD: 6.32 K/UL (ref 1.5–8.1)
NITRITE UR QL STRIP: NEGATIVE
NRBC BLD-RTO: 0 PER 100 WBC
PH UR STRIP: 5.5 [PH] (ref 5–8)
PLATELET # BLD AUTO: 396 K/UL (ref 138–453)
PMV BLD AUTO: 9.4 FL (ref 8.1–13.5)
POTASSIUM SERPL-SCNC: 3.7 MMOL/L (ref 3.7–5.3)
PROT SERPL-MCNC: 6.8 G/DL (ref 6.6–8.7)
PROT UR STRIP-MCNC: ABNORMAL MG/DL
PROTHROMBIN TIME: 12.5 SEC (ref 11.7–14.9)
RBC # BLD AUTO: 4.72 M/UL (ref 3.95–5.11)
RBC #/AREA URNS HPF: NORMAL /HPF (ref 0–4)
SODIUM SERPL-SCNC: 137 MMOL/L (ref 136–145)
SOURCE: NORMAL
SP GR UR STRIP: 1.02 (ref 1–1.03)
SPECIMEN DESCRIPTION: NORMAL
TRICHOMONAS: NEGATIVE
TSH SERPL DL<=0.05 MIU/L-ACNC: 0.84 UIU/ML (ref 0.27–4.2)
UROBILINOGEN UR STRIP-ACNC: NORMAL EU/DL (ref 0–1)
WBC #/AREA URNS HPF: NORMAL /HPF (ref 0–5)
WBC OTHER # BLD: 10.8 K/UL (ref 3.5–11.3)

## 2024-10-04 PROCEDURE — 6360000002 HC RX W HCPCS: Performed by: NURSE ANESTHETIST, CERTIFIED REGISTERED

## 2024-10-04 PROCEDURE — 7100000001 HC PACU RECOVERY - ADDTL 15 MIN: Performed by: OBSTETRICS & GYNECOLOGY

## 2024-10-04 PROCEDURE — 2580000003 HC RX 258: Performed by: NURSE ANESTHETIST, CERTIFIED REGISTERED

## 2024-10-04 PROCEDURE — 87510 GARDNER VAG DNA DIR PROBE: CPT

## 2024-10-04 PROCEDURE — 2580000003 HC RX 258: Performed by: STUDENT IN AN ORGANIZED HEALTH CARE EDUCATION/TRAINING PROGRAM

## 2024-10-04 PROCEDURE — 85610 PROTHROMBIN TIME: CPT

## 2024-10-04 PROCEDURE — 7100000010 HC PHASE II RECOVERY - FIRST 15 MIN: Performed by: OBSTETRICS & GYNECOLOGY

## 2024-10-04 PROCEDURE — 87086 URINE CULTURE/COLONY COUNT: CPT

## 2024-10-04 PROCEDURE — 3600000014 HC SURGERY LEVEL 4 ADDTL 15MIN: Performed by: OBSTETRICS & GYNECOLOGY

## 2024-10-04 PROCEDURE — 88305 TISSUE EXAM BY PATHOLOGIST: CPT

## 2024-10-04 PROCEDURE — 2709999900 HC NON-CHARGEABLE SUPPLY: Performed by: OBSTETRICS & GYNECOLOGY

## 2024-10-04 PROCEDURE — 81001 URINALYSIS AUTO W/SCOPE: CPT

## 2024-10-04 PROCEDURE — 2720000010 HC SURG SUPPLY STERILE: Performed by: OBSTETRICS & GYNECOLOGY

## 2024-10-04 PROCEDURE — 2500000003 HC RX 250 WO HCPCS: Performed by: NURSE ANESTHETIST, CERTIFIED REGISTERED

## 2024-10-04 PROCEDURE — 87591 N.GONORRHOEAE DNA AMP PROB: CPT

## 2024-10-04 PROCEDURE — 83735 ASSAY OF MAGNESIUM: CPT

## 2024-10-04 PROCEDURE — 3600000004 HC SURGERY LEVEL 4 BASE: Performed by: OBSTETRICS & GYNECOLOGY

## 2024-10-04 PROCEDURE — 84443 ASSAY THYROID STIM HORMONE: CPT

## 2024-10-04 PROCEDURE — 86850 RBC ANTIBODY SCREEN: CPT

## 2024-10-04 PROCEDURE — 7100000011 HC PHASE II RECOVERY - ADDTL 15 MIN: Performed by: OBSTETRICS & GYNECOLOGY

## 2024-10-04 PROCEDURE — 85025 COMPLETE CBC W/AUTO DIFF WBC: CPT

## 2024-10-04 PROCEDURE — 87491 CHLMYD TRACH DNA AMP PROBE: CPT

## 2024-10-04 PROCEDURE — 87480 CANDIDA DNA DIR PROBE: CPT

## 2024-10-04 PROCEDURE — 6360000002 HC RX W HCPCS: Performed by: OBSTETRICS & GYNECOLOGY

## 2024-10-04 PROCEDURE — 86900 BLOOD TYPING SEROLOGIC ABO: CPT

## 2024-10-04 PROCEDURE — 87040 BLOOD CULTURE FOR BACTERIA: CPT

## 2024-10-04 PROCEDURE — 3700000000 HC ANESTHESIA ATTENDED CARE: Performed by: OBSTETRICS & GYNECOLOGY

## 2024-10-04 PROCEDURE — 99285 EMERGENCY DEPT VISIT HI MDM: CPT

## 2024-10-04 PROCEDURE — 87660 TRICHOMONAS VAGIN DIR PROBE: CPT

## 2024-10-04 PROCEDURE — 6360000002 HC RX W HCPCS: Performed by: STUDENT IN AN ORGANIZED HEALTH CARE EDUCATION/TRAINING PROGRAM

## 2024-10-04 PROCEDURE — 6360000002 HC RX W HCPCS: Performed by: ANESTHESIOLOGY

## 2024-10-04 PROCEDURE — 83605 ASSAY OF LACTIC ACID: CPT

## 2024-10-04 PROCEDURE — 3700000001 HC ADD 15 MINUTES (ANESTHESIA): Performed by: OBSTETRICS & GYNECOLOGY

## 2024-10-04 PROCEDURE — 83690 ASSAY OF LIPASE: CPT

## 2024-10-04 PROCEDURE — 96374 THER/PROPH/DIAG INJ IV PUSH: CPT

## 2024-10-04 PROCEDURE — 86901 BLOOD TYPING SEROLOGIC RH(D): CPT

## 2024-10-04 PROCEDURE — 7100000000 HC PACU RECOVERY - FIRST 15 MIN: Performed by: OBSTETRICS & GYNECOLOGY

## 2024-10-04 PROCEDURE — 84702 CHORIONIC GONADOTROPIN TEST: CPT

## 2024-10-04 PROCEDURE — 80053 COMPREHEN METABOLIC PANEL: CPT

## 2024-10-04 PROCEDURE — 84703 CHORIONIC GONADOTROPIN ASSAY: CPT

## 2024-10-04 PROCEDURE — 76817 TRANSVAGINAL US OBSTETRIC: CPT

## 2024-10-04 PROCEDURE — 2580000003 HC RX 258: Performed by: OBSTETRICS & GYNECOLOGY

## 2024-10-04 RX ORDER — 0.9 % SODIUM CHLORIDE 0.9 %
1000 INTRAVENOUS SOLUTION INTRAVENOUS ONCE
Status: COMPLETED | OUTPATIENT
Start: 2024-10-04 | End: 2024-10-04

## 2024-10-04 RX ORDER — ONDANSETRON 2 MG/ML
INJECTION INTRAMUSCULAR; INTRAVENOUS
Status: DISCONTINUED | OUTPATIENT
Start: 2024-10-04 | End: 2024-10-04 | Stop reason: SDUPTHER

## 2024-10-04 RX ORDER — HYDRALAZINE HYDROCHLORIDE 20 MG/ML
10 INJECTION INTRAMUSCULAR; INTRAVENOUS
Status: DISCONTINUED | OUTPATIENT
Start: 2024-10-04 | End: 2024-10-04 | Stop reason: HOSPADM

## 2024-10-04 RX ORDER — EPHEDRINE SULFATE/0.9% NACL/PF 25 MG/5 ML
SYRINGE (ML) INTRAVENOUS
Status: DISCONTINUED | OUTPATIENT
Start: 2024-10-04 | End: 2024-10-04 | Stop reason: SDUPTHER

## 2024-10-04 RX ORDER — HYDROCODONE BITARTRATE AND ACETAMINOPHEN 5; 325 MG/1; MG/1
1 TABLET ORAL EVERY 6 HOURS PRN
Qty: 10 TABLET | Refills: 0 | Status: SHIPPED | OUTPATIENT
Start: 2024-10-04 | End: 2024-10-07

## 2024-10-04 RX ORDER — ROCURONIUM BROMIDE 10 MG/ML
INJECTION, SOLUTION INTRAVENOUS
Status: DISCONTINUED | OUTPATIENT
Start: 2024-10-04 | End: 2024-10-04 | Stop reason: SDUPTHER

## 2024-10-04 RX ORDER — MIDAZOLAM HYDROCHLORIDE 1 MG/ML
INJECTION INTRAMUSCULAR; INTRAVENOUS
Status: DISCONTINUED | OUTPATIENT
Start: 2024-10-04 | End: 2024-10-04 | Stop reason: SDUPTHER

## 2024-10-04 RX ORDER — PROPOFOL 10 MG/ML
INJECTION, EMULSION INTRAVENOUS
Status: DISCONTINUED | OUTPATIENT
Start: 2024-10-04 | End: 2024-10-04 | Stop reason: SDUPTHER

## 2024-10-04 RX ORDER — KETOROLAC TROMETHAMINE 30 MG/ML
INJECTION, SOLUTION INTRAMUSCULAR; INTRAVENOUS
Status: DISCONTINUED | OUTPATIENT
Start: 2024-10-04 | End: 2024-10-04 | Stop reason: SDUPTHER

## 2024-10-04 RX ORDER — FENTANYL CITRATE 50 UG/ML
INJECTION, SOLUTION INTRAMUSCULAR; INTRAVENOUS
Status: DISCONTINUED | OUTPATIENT
Start: 2024-10-04 | End: 2024-10-04 | Stop reason: SDUPTHER

## 2024-10-04 RX ORDER — IBUPROFEN 600 MG/1
600 TABLET, FILM COATED ORAL 4 TIMES DAILY PRN
Qty: 30 TABLET | Refills: 0 | Status: SHIPPED | OUTPATIENT
Start: 2024-10-04 | End: 2024-10-14

## 2024-10-04 RX ORDER — NALOXONE HYDROCHLORIDE 0.4 MG/ML
INJECTION, SOLUTION INTRAMUSCULAR; INTRAVENOUS; SUBCUTANEOUS PRN
Status: DISCONTINUED | OUTPATIENT
Start: 2024-10-04 | End: 2024-10-04 | Stop reason: HOSPADM

## 2024-10-04 RX ORDER — IPRATROPIUM BROMIDE AND ALBUTEROL SULFATE 2.5; .5 MG/3ML; MG/3ML
1 SOLUTION RESPIRATORY (INHALATION)
Status: DISCONTINUED | OUTPATIENT
Start: 2024-10-04 | End: 2024-10-04 | Stop reason: HOSPADM

## 2024-10-04 RX ORDER — DEXAMETHASONE SODIUM PHOSPHATE 10 MG/ML
INJECTION, SOLUTION INTRAMUSCULAR; INTRAVENOUS
Status: DISCONTINUED | OUTPATIENT
Start: 2024-10-04 | End: 2024-10-04 | Stop reason: SDUPTHER

## 2024-10-04 RX ORDER — MAGNESIUM HYDROXIDE 1200 MG/15ML
LIQUID ORAL CONTINUOUS PRN
Status: DISCONTINUED | OUTPATIENT
Start: 2024-10-04 | End: 2024-10-04 | Stop reason: HOSPADM

## 2024-10-04 RX ORDER — SODIUM CHLORIDE 0.9 % (FLUSH) 0.9 %
5-40 SYRINGE (ML) INJECTION EVERY 12 HOURS SCHEDULED
Status: DISCONTINUED | OUTPATIENT
Start: 2024-10-04 | End: 2024-10-04 | Stop reason: HOSPADM

## 2024-10-04 RX ORDER — PROCHLORPERAZINE EDISYLATE 5 MG/ML
5 INJECTION INTRAMUSCULAR; INTRAVENOUS
Status: DISCONTINUED | OUTPATIENT
Start: 2024-10-04 | End: 2024-10-04 | Stop reason: HOSPADM

## 2024-10-04 RX ORDER — SODIUM CHLORIDE, SODIUM LACTATE, POTASSIUM CHLORIDE, CALCIUM CHLORIDE 600; 310; 30; 20 MG/100ML; MG/100ML; MG/100ML; MG/100ML
INJECTION, SOLUTION INTRAVENOUS
Status: DISCONTINUED | OUTPATIENT
Start: 2024-10-04 | End: 2024-10-04 | Stop reason: SDUPTHER

## 2024-10-04 RX ORDER — SODIUM CHLORIDE 9 MG/ML
INJECTION, SOLUTION INTRAVENOUS PRN
Status: DISCONTINUED | OUTPATIENT
Start: 2024-10-04 | End: 2024-10-04

## 2024-10-04 RX ORDER — MORPHINE SULFATE 4 MG/ML
4 INJECTION INTRAVENOUS ONCE
Status: COMPLETED | OUTPATIENT
Start: 2024-10-04 | End: 2024-10-04

## 2024-10-04 RX ORDER — DIPHENHYDRAMINE HYDROCHLORIDE 50 MG/ML
12.5 INJECTION INTRAMUSCULAR; INTRAVENOUS
Status: DISCONTINUED | OUTPATIENT
Start: 2024-10-04 | End: 2024-10-04 | Stop reason: HOSPADM

## 2024-10-04 RX ORDER — PHENYLEPHRINE HCL IN 0.9% NACL 1 MG/10 ML
SYRINGE (ML) INTRAVENOUS
Status: DISCONTINUED | OUTPATIENT
Start: 2024-10-04 | End: 2024-10-04 | Stop reason: SDUPTHER

## 2024-10-04 RX ORDER — BUPIVACAINE HYDROCHLORIDE 2.5 MG/ML
INJECTION, SOLUTION EPIDURAL; INFILTRATION; INTRACAUDAL PRN
Status: DISCONTINUED | OUTPATIENT
Start: 2024-10-04 | End: 2024-10-04 | Stop reason: HOSPADM

## 2024-10-04 RX ORDER — LIDOCAINE HYDROCHLORIDE 10 MG/ML
INJECTION, SOLUTION EPIDURAL; INFILTRATION; INTRACAUDAL; PERINEURAL
Status: DISCONTINUED | OUTPATIENT
Start: 2024-10-04 | End: 2024-10-04 | Stop reason: SDUPTHER

## 2024-10-04 RX ORDER — LABETALOL HYDROCHLORIDE 5 MG/ML
10 INJECTION, SOLUTION INTRAVENOUS
Status: DISCONTINUED | OUTPATIENT
Start: 2024-10-04 | End: 2024-10-04 | Stop reason: HOSPADM

## 2024-10-04 RX ORDER — SODIUM CHLORIDE 0.9 % (FLUSH) 0.9 %
5-40 SYRINGE (ML) INJECTION PRN
Status: DISCONTINUED | OUTPATIENT
Start: 2024-10-04 | End: 2024-10-04 | Stop reason: HOSPADM

## 2024-10-04 RX ADMIN — Medication 100 MCG: at 13:28

## 2024-10-04 RX ADMIN — HYDROMORPHONE HYDROCHLORIDE 0.5 MG: 1 INJECTION, SOLUTION INTRAMUSCULAR; INTRAVENOUS; SUBCUTANEOUS at 14:41

## 2024-10-04 RX ADMIN — KETOROLAC TROMETHAMINE 30 MG: 30 INJECTION, SOLUTION INTRAMUSCULAR; INTRAVENOUS at 14:14

## 2024-10-04 RX ADMIN — FENTANYL CITRATE 100 MCG: 50 INJECTION, SOLUTION INTRAMUSCULAR; INTRAVENOUS at 13:19

## 2024-10-04 RX ADMIN — ROCURONIUM BROMIDE 50 MG: 10 INJECTION, SOLUTION INTRAVENOUS at 13:19

## 2024-10-04 RX ADMIN — FENTANYL CITRATE 25 MCG: 50 INJECTION, SOLUTION INTRAMUSCULAR; INTRAVENOUS at 14:33

## 2024-10-04 RX ADMIN — SODIUM CHLORIDE, POTASSIUM CHLORIDE, SODIUM LACTATE AND CALCIUM CHLORIDE: 600; 310; 30; 20 INJECTION, SOLUTION INTRAVENOUS at 13:15

## 2024-10-04 RX ADMIN — MIDAZOLAM 2 MG: 1 INJECTION INTRAMUSCULAR; INTRAVENOUS at 13:17

## 2024-10-04 RX ADMIN — MIDAZOLAM 2 MG: 1 INJECTION INTRAMUSCULAR; INTRAVENOUS at 14:23

## 2024-10-04 RX ADMIN — LIDOCAINE HYDROCHLORIDE 50 MG: 10 INJECTION, SOLUTION EPIDURAL; INFILTRATION; INTRACAUDAL; PERINEURAL at 13:19

## 2024-10-04 RX ADMIN — DEXAMETHASONE SODIUM PHOSPHATE 10 MG: 10 INJECTION INTRAMUSCULAR; INTRAVENOUS at 13:26

## 2024-10-04 RX ADMIN — FENTANYL CITRATE 25 MCG: 50 INJECTION, SOLUTION INTRAMUSCULAR; INTRAVENOUS at 14:23

## 2024-10-04 RX ADMIN — SUGAMMADEX 200 MG: 100 INJECTION, SOLUTION INTRAVENOUS at 14:14

## 2024-10-04 RX ADMIN — EPHEDRINE SULFATE 10 MG: 5 INJECTION INTRAVENOUS at 14:01

## 2024-10-04 RX ADMIN — SODIUM CHLORIDE 1000 ML: 9 INJECTION, SOLUTION INTRAVENOUS at 11:39

## 2024-10-04 RX ADMIN — MORPHINE SULFATE 4 MG: 4 INJECTION INTRAVENOUS at 09:43

## 2024-10-04 RX ADMIN — PROPOFOL 200 MG: 10 INJECTION, EMULSION INTRAVENOUS at 13:19

## 2024-10-04 RX ADMIN — SODIUM CHLORIDE, POTASSIUM CHLORIDE, SODIUM LACTATE AND CALCIUM CHLORIDE: 600; 310; 30; 20 INJECTION, SOLUTION INTRAVENOUS at 13:18

## 2024-10-04 RX ADMIN — ONDANSETRON 4 MG: 2 INJECTION INTRAMUSCULAR; INTRAVENOUS at 13:26

## 2024-10-04 ASSESSMENT — LIFESTYLE VARIABLES
HOW OFTEN DO YOU HAVE A DRINK CONTAINING ALCOHOL: NEVER
HOW MANY STANDARD DRINKS CONTAINING ALCOHOL DO YOU HAVE ON A TYPICAL DAY: PATIENT DOES NOT DRINK

## 2024-10-04 ASSESSMENT — PAIN - FUNCTIONAL ASSESSMENT
PAIN_FUNCTIONAL_ASSESSMENT: 0-10
PAIN_FUNCTIONAL_ASSESSMENT: 0-10

## 2024-10-04 ASSESSMENT — PAIN SCALES - GENERAL
PAINLEVEL_OUTOF10: 7
PAINLEVEL_OUTOF10: 8

## 2024-10-04 ASSESSMENT — PAIN DESCRIPTION - LOCATION
LOCATION: ABDOMEN
LOCATION: ABDOMEN

## 2024-10-04 ASSESSMENT — PAIN DESCRIPTION - DESCRIPTORS
DESCRIPTORS: CRAMPING
DESCRIPTORS: SHARP;PRESSURE

## 2024-10-04 NOTE — ED NOTES
C/o llq pain for a few days that started as \" aching \" and starting today has increased until pain is now into rectum, denies FB. Has been spotting the last 2 months for a period and is spotting today . Took a preg test last month that was negative. States there is a chance she is pregnant. Denies dysuria but having pressure with urination, denies constipation. Pt stat the pain is intense and movement makes it worse. No hx of ectopic. Unable to eat d/t pain . Pt is uncomfortable and writhing in bed trying to get comfortable . Did not take anything for pain pta

## 2024-10-04 NOTE — DISCHARGE INSTRUCTIONS
Signed         Patient Name: Eduard Rosas  Date: 10/4/2024  Time: 2:25 PM  MRN #: [id number]  [facility] #: [id number]       [facility] Obstetrics & Gynecology  Outpatient Surgery After Care Instructions     For Problems after Leaving the Hospital  Call [phone]  IN AN EMERGENCY CALL 911 OR GO TO THE NEAREST EMERGENCY ROOM     For The next 24 hours:  Do not: drive, work, or operate machinery  Do not: consume alcohol, tranquilizers, or sleeping medications  Do not: make important personal or business decisions     Normal post operative expectations:  A low grade fever                            Orange  coloration  of skin  from  soap                  Sore  scratchy  throat                      Minimal  amount  of swelling  or  bruising  Drainage  from operative  site        Other:                                                       Specific problems or warning signs to watch for:     Call [phone] if any of the following occur:  Persistent bleeding not stopped by direct pressure  Coughing or vomiting a large amount of bright red blood  Unable to tolerate liquids for more than 4 hours  Severe unexplained pain or burning  Temperature greater than 101 F  Unable to urinate for more than 6 hours  Other:      Medications:  A copy of your medication reconciliation form has been given to you  You have been given drug information sheets.  Side effects, warnings and how to use these medications can be found on these sheets.  x Resume your usual medications           x medications reconciled  x Take prescriptions as directed              x prescriptions reviewed  x Norco 5/325 mg one-two tabs every 4-6 hours as needed for pain  x Motrin 600 mg one tab every 6 hours with food  x Take Pain medication with food                    [facility] Obstetrics & Gynecology  Activity:  Restrict your activities for the rest of the day.  Resume light activity tomorrow as you feel you can unless otherwise instructed. No driving  for 48 hours.  You are advised to go directly home from the hospital.  Do not engage in any strenuous activity that may put stress on your incision.  No intercourse, douching, tampons, baths, lakes, or pools.  No lifting or excessive bending.  Instructed on deep breathing exercises.  Diet:  x You may resume a normal diet as you feel you can.  x Start with clear liquids and progress gradually to your usual diet if you are not  nauseated.     Care of your operative area:  x Keep dressing and operative area dry and clean.  x Apply ice for 20 minutes 4-6 times a day for two days.  x Remove your dressing in 48 hours.  x You may shower in 1 days.       Do not soak in a bath or swim until cleared to do so by your physician.  Special instructions: no intercourse / tampons/ douching x 2 weeks  Other:      Call [phone] to schedule your follow-up appointment:  You should return to the office in  7-10  days      No alcoholic beverages, no driving or operating machinery, no making important decisions for 24 hours.   You may have a normal diet but should eat lightly day of surgery.  Drink plenty of fluids.  Urinate within 8 hours after surgery, if unable to urinate call your doctor

## 2024-10-04 NOTE — ANESTHESIA POSTPROCEDURE EVALUATION
Department of Anesthesiology  Postprocedure Note    Patient: Eduard Rosas  MRN: [id number]  YOB: 1995  Date of evaluation: 10/4/2024    Procedure Summary       Date: 10/04/24 Room / Location: [address]    Anesthesia Start: 1312 Anesthesia Stop:     Procedure: OPERATIVE LAPAROSCOPY WITH LEFT SALPINGECTOMY AND PELVIC IRRIGATION (Left) Diagnosis:       Ectopic pregnancy, unspecified location, unspecified whether intrauterine pregnancy present      (Ectopic pregnancy, unspecified location, unspecified whether intrauterine pregnancy present [O00.90])    Surgeons: Dianna Key DO Responsible Provider: Grecia Couch MD    Anesthesia Type: general ASA Status: 2            Anesthesia Type: No value filed.    Genny Phase I: Genny Score: 8    Genny Phase II:      Anesthesia Post Evaluation    Patient location during evaluation: bedside  Patient participation: complete - patient participated  Level of consciousness: awake and awake and alert  Pain score: 1  Airway patency: patent  Nausea & Vomiting: no nausea and no vomiting  Cardiovascular status: blood pressure returned to baseline and hemodynamically stable  Respiratory status: acceptable  Hydration status: euvolemic  Pain management: adequate    No notable events documented.

## 2024-10-04 NOTE — OP NOTE
Gynecologic Operative Note      NAME: Eduard Rosas   MRN: [id number]  CSN: [id number]  : 1995    PROCEDURE DATE: 10/4/2024     Pre-op Diagnosis: Pre-Op Diagnosis Codes:      * Ectopic pregnancy, unspecified location, unspecified whether intrauterine pregnancy present [O00.90]     Post-op Diagnosis: Same; Ruptured left ectopic pregnancy    Procedure: Procedure(s):  OPERATIVE LAPAROSCOPY WITH LEFT SALPINGECTOMY AND PELVIC IRRIGATION    Surgeon: Dianna Key DO    Assistant:   Win Street M.D.    Circulator Documentation of Staff:  Surgeons and Role:     * Dianna Key DO - Primary     * Valeri Street MD - Assisting    OR Staff:  Charla Scrub: Teresita Valdivia  Scrub Person First: Pascual Mccormick      Anesthesia Type: General  Anesthesia Staff: Anesthesiologist: Grecia Couch MD  CRNA: Valeri Grijalva APRN - CRNA      Complications: None      Estimated Blood Loss: <10 in procedure ~ 50 ml in pelvis upon laparoscopy  Total IV Fluids:  1500 ml  Urine Output: 150 ml clear      Specimens:   ID Type Source Tests Collected by Time Destination   A : LEFT FALLOPIAN TUBE WITH PRODUCTS OF CONCEPTION Tissue Uterus SURGICAL PATHOLOGY Dianna Key DO 10/4/2024 1414      Implants: * No implants in log *    Drains: * No LDAs found *      Condition: Stable    Findings: small AV uterus. Large ectopic pregnancy on left incorporating entire tube necessitating salpingectomy. ~ 50 ml blood and clots in pelvis c/w prior rupture. Right fallopian tube and ovary as well  as left ovary wnl     Description of Procedure: (Understanding of limitations from template op-reports exist)  The patient was seen in the pre-operative area. The procedure risk and complications were reviewed.  The consent , labs , and H&P were reviewed. The patient had all of her questions answered.  The patient was moved to the operative suite where she was placed under general anesthesia by the  QBHCG\"S will be checked every two weeks until less than 5.    The patients RH status is positive. RHOGAM GIVEN-No          The patient will be discharged from recovery room when stable.   See Orders.    Electronically signed by Dianna Key DO on 10/4/24 at 2:17 PM EDT

## 2024-10-04 NOTE — H&P
OB/GYN Pre-Op H&P  [facility]    Patient Name: Jacklyn Rosas     Patient : 1995  Room/Bed:   Admission Date/Time: 10/4/2024  9:24 AM  Primary Care Physician: Ruben Florence DO  MRN: [id number]    Date: 10/4/2024  Time: 12:33 PM    The patient was seen in pre-op holding. She is here for diagnostic laparoscopy, left salpingectomy for ruptured ectopic pregnancy.    The procedure risks and complications were reviewed.  The labs, Consent, and H&P were reviewed and updated.  The patient was counseled on the possibility of  the need of a second surgery.  The patient voiced understanding and had all of her questions answered. The possibility of incomplete removal of abnormal tissue was discussed.    OBSTETRICAL HISTORY:   OB History    Para Term  AB Living   4 3 3 0 0 3   SAB IAB Ectopic Molar Multiple Live Births   0 0 0 0 0 3      # Outcome Date GA Lbr [place]/2nd Weight Sex Type Anes PTL Lv   4             3 Term 21 40w1d / 00:01 3.065 kg (6 lb 12.1 oz) F Vag-Spont None N JUANA      Name: NAKITA,BABY GIRL JACKLYN LIAO      Apgar1: 8  Apgar5: 9   2 Term 16 40w0d  3.118 kg (6 lb 14 oz) M Vag-Spont   JUANA   1 Term 14 40w0d  3.175 kg (7 lb) F Vag-Spont EPI N JUANA       PAST MEDICAL HISTORY:   has no past medical history on file.    PAST SURGICAL HISTORY:   has a past surgical history that includes Tonsillectomy; Cystocopy; and Dilation & curettage.    ALLERGIES:  Allergies as of 10/04/2024 - Fully Reviewed 10/04/2024   Allergen Reaction Noted    Clindamycin Shortness Of Breath 05/10/2023       MEDICATIONS:  Current Facility-Administered Medications   Medication Dose Route Frequency Provider Last Rate Last Admin    sodium chloride 0.9 % bolus 1,000 mL  1,000 mL IntraVENous Once Bhupinder Velazquez MD 1,000 mL/hr at 10/04/24 1139 1,000 mL at 10/04/24 1139     Current Outpatient Medications   Medication Sig Dispense Refill    etonogestrel-ethinyl

## 2024-10-04 NOTE — ANESTHESIA PRE PROCEDURE
use: No                                Ready to quit: Not Answered  Counseling given: Not Answered      Vital Signs (Current):   Vitals:    10/04/24 1038 10/04/24 1039 10/04/24 1040 10/04/24 1253   BP:    120/62   Pulse: 97 95 94 93   Resp: 19 17  16   Temp:    97.5 °F (36.4 °C)   TempSrc:       SpO2: 96% 95% 97% 98%                                              BP Readings from Last 3 Encounters:   10/04/24 120/62   09/06/23 118/88   05/10/23 118/84       NPO Status:                                                                                 BMI:   Wt Readings from Last 3 Encounters:   09/06/23 88.5 kg (195 lb)   05/10/23 88 kg (194 lb)   02/07/23 88.5 kg (195 lb)     There is no height or weight on file to calculate BMI.    CBC:   Lab Results   Component Value Date/Time    WBC 10.8 10/04/2024 09:59 AM    RBC 4.72 10/04/2024 09:59 AM    RBC 4.64 10/21/2011 10:52 AM    HGB 14.4 10/04/2024 09:59 AM    HCT 44.8 10/04/2024 09:59 AM    MCV 94.9 10/04/2024 09:59 AM    RDW 14.0 10/04/2024 09:59 AM     10/04/2024 09:59 AM     10/21/2011 10:52 AM       CMP:   Lab Results   Component Value Date/Time     10/04/2024 09:59 AM    K 3.7 10/04/2024 09:59 AM     10/04/2024 09:59 AM    CO2 20 10/04/2024 09:59 AM    BUN 7 10/04/2024 09:59 AM    CREATININE 0.7 10/04/2024 09:59 AM    GFRAA >60 08/01/2021 05:27 PM    LABGLOM >90 10/04/2024 09:59 AM    LABGLOM >60 04/22/2023 07:35 AM    GLUCOSE 146 10/04/2024 09:59 AM    GLUCOSE 75 10/21/2011 10:52 AM    CALCIUM 9.3 10/04/2024 09:59 AM    BILITOT 0.5 10/04/2024 09:59 AM    ALKPHOS 80 10/04/2024 09:59 AM    AST 23 10/04/2024 09:59 AM    ALT 36 10/04/2024 09:59 AM       POC Tests: No results for input(s): \"POCGLU\", \"POCNA\", \"POCK\", \"POCCL\", \"POCBUN\", \"POCHEMO\", \"POCHCT\" in the last 72 hours.    Coags:   Lab Results   Component Value Date/Time    PROTIME 12.5 10/04/2024 09:59 AM    INR 0.9 10/04/2024 09:59 AM       HCG (If Applicable):   Lab Results   Component

## 2024-10-04 NOTE — ED NOTES
Bp stable, pain is improved. Has pain with movement and during pelvic when speculum inserted. Dark blood noted to swab.

## 2024-10-04 NOTE — ED NOTES
Surgical consent obtained by OB, writer to witness. Patient mom Sameera is on her way here, triage notified to direct patient mom to OR waiting Room. OB resident notified of patient mom name and will be in OR waiting room.

## 2024-10-04 NOTE — H&P
OB/GYN Pre-Op H&P  [facility]    Patient Name: Jacklyn Rosas     Patient : 1995  Room/Bed:   Admission Date/Time: 10/4/2024  9:24 AM  Primary Care Physician: Ruben Florence DO  MRN: [id number]    Date: 10/4/2024  Time: 12:30 PM    The patient was seen in ED. She is here for dx laparoscopy, possible salpingectomy, possible oophorectomy, possible open.    Patient arrived to the ED with complaints of vaginal bleeding, pelvic pain and known pregnancy. Ultrasound was performed and showed mild to moderate hemoperitoneum with ruptured live ectopic. Decision made to proceed to the OR emergently. Hgb stable 14.6    The procedure risks and complications were reviewed.  The labs, Consent, and H&P were reviewed and updated.  The patient was counseled on the possibility of  the need of a second surgery.  The patient voiced understanding and had all of her questions answered. The possibility of incomplete removal of abnormal tissue was discussed.    OBSTETRICAL HISTORY:   OB History    Para Term  AB Living   4 3 3 0 0 3   SAB IAB Ectopic Molar Multiple Live Births   0 0 0 0 0 3      # Outcome Date GA Lbr [place]/2nd Weight Sex Type Anes PTL Lv   4             3 Term 21 40w1d / 00:01 3.065 kg (6 lb 12.1 oz) F Vag-Spont None N JUANA      Name: NAKITA,BABY GIRL JACKLYN LIAO      Apgar1: 8  Apgar5: 9   2 Term 16 40w0d  3.118 kg (6 lb 14 oz) M Vag-Spont   JUANA   1 Term 14 40w0d  3.175 kg (7 lb) F Vag-Spont EPI N JUANA       PAST MEDICAL HISTORY:   has no past medical history on file.    PAST SURGICAL HISTORY:   has a past surgical history that includes Tonsillectomy; Cystocopy; and Dilation & curettage.    ALLERGIES:  Allergies as of 10/04/2024 - Fully Reviewed 10/04/2024   Allergen Reaction Noted    Clindamycin Shortness Of Breath 05/10/2023       MEDICATIONS:  Current Facility-Administered Medications   Medication Dose Route Frequency Provider Last Rate Last  removal of abnormal tissue.    Therese Lutz MD  Ob/Gyn Resident  [facility], [facility]  10/4/2024, 12:30 PM

## 2024-10-04 NOTE — CONSULTS
OB/GYN Consult  [facility]    Patient Name: Jacklyn Rosas     Patient : 1995  Room/Bed: [facility] OR [place] RM/NONE  Admission Date/Time: 10/4/2024  9:24 AM  Primary Care Physician: Ruben Florence DO    Consulting Provider: Dr. Velazquez  Reason for Consult: Concern for ruptured ectopic    The patient was seen in ED. She is here for dx laparoscopy, possible salpingectomy, possible oophorectomy, possible open.     Patient arrived to the ED with complaints of vaginal bleeding, pelvic pain and known pregnancy. Ultrasound was performed and showed mild to moderate hemoperitoneum with ruptured live ectopic. Decision made to proceed to the OR emergently. Hgb stable 14.6     The procedure risks and complications were reviewed.  The labs, Consent, and H&P were reviewed and updated.  The patient was counseled on the possibility of  the need of a second surgery.  The patient voiced understanding and had all of her questions answered. The possibility of incomplete removal of abnormal tissue was discussed.     OBSTETRICAL HISTORY:                     OB History    Para Term  AB Living   4 3 3 0 0 3   SAB IAB Ectopic Molar Multiple Live Births    0 0 0 0 0 3       # Outcome Date GA Lbr [place]/2nd Weight Sex Type Anes PTL Lv   4                      3 Term 21 40w1d / 00:01 3.065 kg (6 lb 12.1 oz) F Vag-Spont None N JUANA      Name: NAKITA,BABY GIRL JACKLYN LIAO      Apgar1: 8  Apgar5: 9   2 Term 16 40w0d   3.118 kg (6 lb 14 oz) M Vag-Spont     JUANA   1 Term 14 40w0d   3.175 kg (7 lb) F Vag-Spont EPI N JUANA         PAST MEDICAL HISTORY:   has no past medical history on file.     PAST SURGICAL HISTORY:   has a past surgical history that includes Tonsillectomy; Cystocopy; and Dilation & curettage.     ALLERGIES:        Allergies as of 10/04/2024 - Fully Reviewed 10/04/2024   Allergen Reaction Noted    Clindamycin Shortness Of Breath 05/10/2023        labs, consents have been completed. The history and physical as well as all supporting surgical documentation will be forwarded to the pre-operative holding area.     The patient is aware that this procedure may not alleviate her symptoms. That there may be a necessity for a second surgery and that there may be an incomplete removal of abnormal tissue.    Plan discussed with Dr. Cardona, who is agreeable.     Attending's Name: Dr. Dulce Miner MD  Ob/Gyn Resident   [facility]  10/4/2024, 4:28 PM

## 2024-10-04 NOTE — DISCHARGE SUMMARY
Patient Name: Eduard Rosas  Date: 10/4/2024  Time: 2:25 PM  MRN #: [id number]  [facility] #: [id number]      [facility] Obstetrics & Gynecology  Outpatient Surgery After Care Instructions    For Problems after Leaving the Hospital  Call [phone]  IN AN EMERGENCY CALL 911 OR GO TO THE NEAREST EMERGENCY ROOM    For The next 24 hours:  Do not: drive, work, or operate machinery  Do not: consume alcohol, tranquilizers, or sleeping medications  Do not: make important personal or business decisions    Normal post operative expectations:  A low grade fever      Orange  coloration  of skin  from  soap    Sore  scratchy  throat    Minimal  amount  of swelling  or  bruising  Drainage  from operative  site    Other:                                                      Specific problems or warning signs to watch for:    Call [phone] if any of the following occur:  Persistent bleeding not stopped by direct pressure  Coughing or vomiting a large amount of bright red blood  Unable to tolerate liquids for more than 4 hours  Severe unexplained pain or burning  Temperature greater than 101 F  Unable to urinate for more than 6 hours  Other:     Medications:  A copy of your medication reconciliation form has been given to you  You have been given drug information sheets.  Side effects, warnings and how to use these medications can be found on these sheets.  x Resume your usual medications      x medications reconciled  x Take prescriptions as directed             x prescriptions reviewed  x Norco 5/325 mg one-two tabs every 4-6 hours as needed for pain  x Motrin 600 mg one tab every 6 hours with food  x Take Pain medication with food              [facility] Obstetrics & Gynecology  Activity:  Restrict your activities for the rest of the day.  Resume light activity tomorrow as you feel you can unless otherwise instructed. No driving for 48 hours.  You are advised to go directly home from the hospital.  Do not engage in any

## 2024-10-04 NOTE — ED PROVIDER NOTES
[facility] ED  Emergency Department Encounter  Emergency Medicine Resident     Pt Name:Eduard Rosas  MRN: [id number]  Birthdate 1995  Date of evaluation: 10/4/24  PCP:  Ruben Florence DO  Note Started: 9:33 AM EDT      CHIEF COMPLAINT       Chief Complaint   Patient presents with    Abdominal Pain       HISTORY OF PRESENT ILLNESS  (Location/Symptom, Timing/Onset, Context/Setting, Quality, Duration, Modifying Factors, Severity.)      Eduard Rosas is a 28 y.o. female with no significant past medical history , with 2 SAB, presenting for assessment of abdominal pain. Onset was approximately 2 days ago. It is primarily located in the left lower pelvis. Nonradiating. Associated with vaginal spotting. Last menstrual period was approximately 7 weeks ago. Reports generally regular periods. Has not taken anything any medication for symptoms.     PAST MEDICAL / SURGICAL / SOCIAL / FAMILY HISTORY      has no past medical history on file.       has a past surgical history that includes Tonsillectomy; Cystocopy; and Dilation & curettage.      Social History     Socioeconomic History    Marital status: Single     Spouse name: Not on file    Number of children: Not on file    Years of education: Not on file    Highest education level: Not on file   Occupational History    Not on file   Tobacco Use    Smoking status: Every Day     Current packs/day: 0.50     Types: Cigarettes    Smokeless tobacco: Never   Vaping Use    Vaping status: Never Used   Substance and Sexual Activity    Alcohol use: No    Drug use: No    Sexual activity: Yes     Partners: Male   Other Topics Concern    Not on file   Social History Narrative    Not on file     Social Determinants of Health     Financial Resource Strain: Low Risk  (2023)    Overall Financial Resource Strain (CARDIA)     Difficulty of Paying Living Expenses: Not hard at all   Food Insecurity: Not on file (2023)   Transportation  ruptured ectopic pregnancy in the left adnexa with small to moderate hemoperitoneum present.    OB/GYN consulted.    12:34 PM  Case reviewed with OB/GYN at bedside.  Planning for OR.  Pending admission.         PROCEDURES:       CONSULTS:  IP CONSULT TO OB GYN    CRITICAL CARE:  There was significant risk of life threatening deterioration of patient's condition requiring my direct management. Critical care time   minutes, excluding any documented procedures.    FINAL IMPRESSION      1. Ruptured ectopic pregnancy          DISPOSITION / PLAN     DISPOSITION Decision To Admit 10/04/2024 12:34:20 PM  Condition at Disposition: Data Unavailable      PATIENT REFERRED TO:  No follow-up provider specified.    DISCHARGE MEDICATIONS:  New Prescriptions    No medications on file       Bhupinder Velazquez MD  Emergency Medicine Resident    (Please note that portions of this note were completed with a voice recognition program.  Efforts were made to edit the dictations but occasionally words are mis-transcribed.)

## 2024-10-04 NOTE — ED PROVIDER NOTES
[facility]     Emergency Department     Faculty Attestation    I performed a history and physical examination of the patient and discussed management with the resident. I reviewed the resident’s note and agree with the documented findings and plan of care. Any areas of disagreement are noted on the chart. I was personally present for the key portions of any procedures. I have documented in the chart those procedures where I was not present during the key portions. I have reviewed the emergency nurses triage note. I agree with the chief complaint, past medical history, past surgical history, allergies, medications, social and family history as documented unless otherwise noted below.   For Physician Assistant/ Nurse Practitioner cases/documentation I have personally evaluated this patient and have completed at least one if not all key elements of the E/M (history, physical exam, and MDM). Additional findings are as noted.      Primary Care Physician:  Ruben Florence DO    CHIEF COMPLAINT       Chief Complaint   Patient presents with    Abdominal Pain       RECENT VITALS:   Temp: 98.4 °F (36.9 °C),  Pulse: (!) 118, Respirations: 26, BP: (!) 146/117    LABS:  Labs Reviewed   VAGINITIS DNA PROBE   C.TRACHOMATIS N.GONORRHOEAE DNA   CULTURE, BLOOD 1   CULTURE, BLOOD 2   CBC WITH AUTO DIFFERENTIAL   COMPREHENSIVE METABOLIC PANEL   HCG, SERUM, QUALITATIVE   LACTIC ACID   LIPASE   MAGNESIUM   PROTIME-INR   TSH WITH REFLEX   URINALYSIS WITH REFLEX TO CULTURE   TYPE AND SCREEN         PERTINENT ATTENDING PHYSICIAN COMMENTS:    Patient with left lower quadrant pain and vaginal spotting tachycardia we will do the workup with the tachycardia and tachypnea we will also do a septic workup    Critical Care          Herminio Pedraza MD,  MD, FAAEM  Attending Emergency  Physician              Herminio Pedraza MD  10/04/24 3505

## 2024-10-05 LAB
MICROORGANISM SPEC CULT: NO GROWTH
SERVICE CMNT-IMP: NORMAL
SPECIMEN DESCRIPTION: NORMAL

## 2024-10-06 LAB
MICROORGANISM SPEC CULT: NORMAL
MICROORGANISM SPEC CULT: NORMAL
SERVICE CMNT-IMP: NORMAL
SERVICE CMNT-IMP: NORMAL
SPECIMEN DESCRIPTION: NORMAL
SPECIMEN DESCRIPTION: NORMAL

## 2024-10-07 LAB
C TRACH DNA SPEC QL PROBE+SIG AMP: NEGATIVE
N GONORRHOEA DNA SPEC QL PROBE+SIG AMP: NEGATIVE
SPECIMEN DESCRIPTION: NORMAL

## 2024-10-08 LAB — SURGICAL PATHOLOGY REPORT: NORMAL

## 2024-10-17 ENCOUNTER — OFFICE VISIT (OUTPATIENT)
Dept: OBGYN CLINIC | Age: 29
End: 2024-10-17

## 2024-10-17 VITALS
WEIGHT: 201 LBS | HEIGHT: 62 IN | BODY MASS INDEX: 36.99 KG/M2 | DIASTOLIC BLOOD PRESSURE: 76 MMHG | SYSTOLIC BLOOD PRESSURE: 114 MMHG

## 2024-10-17 DIAGNOSIS — Z98.890 S/P LAPAROSCOPY: ICD-10-CM

## 2024-10-17 DIAGNOSIS — O00.90 RUPTURED ECTOPIC PREGNANCY: Primary | ICD-10-CM

## 2024-10-17 PROBLEM — H81.13 BENIGN PAROXYSMAL VERTIGO, BILATERAL: Status: ACTIVE | Noted: 2024-10-17

## 2024-10-17 PROCEDURE — 99024 POSTOP FOLLOW-UP VISIT: CPT | Performed by: OBSTETRICS & GYNECOLOGY

## 2024-10-17 NOTE — PROGRESS NOTES
Eduard Rosas  10/17/2024  10:47 AM      Eduard Rosas  Procedure: Laparoscopic left salpingectomy with removal ectopic pregnancy and pelvic irrigation      Eduard Rosas is a 29 y.o. female       The patient was seen, she denies any complaints. She denied any shortness of breath, chest pain or dizziness. She denied any nausea, vomiting, or diarrhea. There is no fever, chills, or rigors. The patient denies any vaginal bleeding, discharge or odor. All of her pre-operative complaints are now resolved.    Blood pressure 114/76, height 1.575 m (5' 2\"), weight 91.2 kg (201 lb), unknown if currently breastfeeding.           Abdominal Exam: soft non-tender. Good bowel sounds. No guarding, rebound or rigidity.  No costal vertebral angle tenderness bilateral. No hernias    Incision: healing well, no drainage, no erythema    Extremities: No edema or calf pain noted bilaterally.    Pelvic Exam:Exam deferred.      Results for orders placed or performed during the hospital encounter of 10/04/24   Vaginitis DNA Probe    Specimen: Vaginal   Result Value Ref Range    Source .VAGINAL SWAB     Trichomonas NEGATIVE NEGATIVE    GARDNERELLA VAGINALIS NEGATIVE NEGATIVE    Candida species NEGATIVE NEGATIVE   C.trachomatis N.gonorrhoeae DNA    Specimen: Cervix   Result Value Ref Range    Specimen Description .CERVIX     C. trachomatis DNA NEGATIVE NEGATIVE    N. gonorrhoeae DNA NEGATIVE NEGATIVE   Blood Culture 1    Specimen: Blood   Result Value Ref Range    Specimen Description .BLOOD     Special Requests R AC 10 ML     Culture NO GROWTH 5 DAYS    Culture, Blood 2    Specimen: Blood   Result Value Ref Range    Specimen Description .BLOOD     Special Requests L HAND 10 ML     Culture NO GROWTH 5 DAYS    Culture, Urine    Specimen: Urine, indwelling catheter   Result Value Ref Range    Specimen Description .INDWELLING CATH URINE     Special Requests Site: Urine     Culture NO GROWTH    CBC with Auto

## 2024-11-04 ENCOUNTER — HOSPITAL ENCOUNTER (OUTPATIENT)
Age: 29
Setting detail: SPECIMEN
Discharge: HOME | End: 2024-11-04

## 2024-11-04 ENCOUNTER — LAB (OUTPATIENT)
Dept: OBGYN CLINIC | Age: 29
End: 2024-11-04
Payer: COMMERCIAL

## 2024-11-04 DIAGNOSIS — O00.90 RUPTURED ECTOPIC PREGNANCY: ICD-10-CM

## 2024-11-04 DIAGNOSIS — O00.90 RUPTURED ECTOPIC PREGNANCY: Primary | ICD-10-CM

## 2024-11-04 LAB — B-HCG SERPL EIA 3RD IS-ACNC: <0.2 MIU/ML

## 2024-11-04 PROCEDURE — 36415 COLL VENOUS BLD VENIPUNCTURE: CPT | Performed by: STUDENT IN AN ORGANIZED HEALTH CARE EDUCATION/TRAINING PROGRAM

## 2024-11-04 NOTE — PROGRESS NOTES
Patient was in the office today for a CG.    Draw per physician order using sterile technique.  Drawn from the Right antecubital.

## 2025-02-12 ENCOUNTER — HOSPITAL ENCOUNTER (OUTPATIENT)
Age: 30
Setting detail: SPECIMEN
Discharge: HOME | End: 2025-02-12

## 2025-02-12 ENCOUNTER — OFFICE VISIT (OUTPATIENT)
Dept: OBGYN CLINIC | Age: 30
End: 2025-02-12
Payer: COMMERCIAL

## 2025-02-12 VITALS
BODY MASS INDEX: 36.99 KG/M2 | HEIGHT: 62 IN | WEIGHT: 201 LBS | SYSTOLIC BLOOD PRESSURE: 118 MMHG | DIASTOLIC BLOOD PRESSURE: 78 MMHG | HEART RATE: 84 BPM

## 2025-02-12 DIAGNOSIS — N90.89 VULVAR IRRITATION: ICD-10-CM

## 2025-02-12 DIAGNOSIS — Z78.9 ATTEMPTING TO CONCEIVE: ICD-10-CM

## 2025-02-12 DIAGNOSIS — Z01.419 WELL WOMAN EXAM WITH ROUTINE GYNECOLOGICAL EXAM: Primary | ICD-10-CM

## 2025-02-12 DIAGNOSIS — N76.0 ACUTE VAGINITIS: ICD-10-CM

## 2025-02-12 DIAGNOSIS — L73.2 HIDRADENITIS SUPPURATIVA: ICD-10-CM

## 2025-02-12 PROCEDURE — 99395 PREV VISIT EST AGE 18-39: CPT | Performed by: STUDENT IN AN ORGANIZED HEALTH CARE EDUCATION/TRAINING PROGRAM

## 2025-02-12 RX ORDER — BENZOYL PEROXIDE 50 MG/ML
LIQUID TOPICAL
Qty: 148 ML | Refills: 3 | Status: SHIPPED | OUTPATIENT
Start: 2025-02-12

## 2025-02-12 RX ORDER — PNV NO.95/FERROUS FUM/FOLIC AC 28MG-0.8MG
1 TABLET ORAL DAILY
Qty: 30 TABLET | Refills: 12 | Status: SHIPPED | OUTPATIENT
Start: 2025-02-12

## 2025-02-12 SDOH — ECONOMIC STABILITY: FOOD INSECURITY: WITHIN THE PAST 12 MONTHS, YOU WORRIED THAT YOUR FOOD WOULD RUN OUT BEFORE YOU GOT MONEY TO BUY MORE.: NEVER TRUE

## 2025-02-12 SDOH — ECONOMIC STABILITY: FOOD INSECURITY: WITHIN THE PAST 12 MONTHS, THE FOOD YOU BOUGHT JUST DIDN'T LAST AND YOU DIDN'T HAVE MONEY TO GET MORE.: NEVER TRUE

## 2025-02-12 ASSESSMENT — PATIENT HEALTH QUESTIONNAIRE - PHQ9
SUM OF ALL RESPONSES TO PHQ QUESTIONS 1-9: 0
SUM OF ALL RESPONSES TO PHQ QUESTIONS 1-9: 0
2. FEELING DOWN, DEPRESSED OR HOPELESS: NOT AT ALL
1. LITTLE INTEREST OR PLEASURE IN DOING THINGS: NOT AT ALL
SUM OF ALL RESPONSES TO PHQ QUESTIONS 1-9: 0
SUM OF ALL RESPONSES TO PHQ9 QUESTIONS 1 & 2: 0
SUM OF ALL RESPONSES TO PHQ QUESTIONS 1-9: 0

## 2025-02-12 NOTE — PROGRESS NOTES
Ovarian, Colon and Uterine Cancer screening.  Routine health maintenance per patients PCP discussed.    Patient was seen with total face to face time of 20 minutes. More than 50% of this visit was on counseling and education regarding the problems listed below and her options. She was also counseled on her preventative health maintenance recommendations and follow-up.   Diagnosis Orders   1. Well woman exam with routine gynecological exam        2. Acute vaginitis  Vaginitis DNA Probe    Chlamydia Trachomatis & Neisseria gonorrhoeae (GC) by amplified detection      3. Attempting to conceive  Prenatal Vit-Fe Fumarate-FA (PRENATAL VITAMIN) 27-0.8 MG TABS      4. Hidradenitis suppurativa  benzoyl peroxide 5 % external liquid      5. Vulvar irritation  Vaginitis DNA Probe    Chlamydia Trachomatis & Neisseria gonorrhoeae (GC) by amplified detection           Abelino Lynn DO  [facility]  2/12/2025, 9:43 PM

## 2025-02-13 LAB
C TRACH DNA SPEC QL PROBE+SIG AMP: NEGATIVE
CANDIDA SPECIES: POSITIVE
GARDNERELLA VAGINALIS: NEGATIVE
N GONORRHOEA DNA SPEC QL PROBE+SIG AMP: NEGATIVE
SOURCE: ABNORMAL
SPECIMEN DESCRIPTION: NORMAL
TRICHOMONAS: NEGATIVE

## 2025-02-13 RX ORDER — FLUCONAZOLE 150 MG/1
150 TABLET ORAL ONCE
Qty: 1 TABLET | Refills: 0 | Status: SHIPPED | OUTPATIENT
Start: 2025-02-13 | End: 2025-02-13

## 2025-06-09 ENCOUNTER — APPOINTMENT (OUTPATIENT)
Dept: GENERAL RADIOLOGY | Age: 30
End: 2025-06-09
Payer: COMMERCIAL

## 2025-06-09 ENCOUNTER — HOSPITAL ENCOUNTER (EMERGENCY)
Age: 30
Discharge: HOME | End: 2025-06-09
Attending: STUDENT IN AN ORGANIZED HEALTH CARE EDUCATION/TRAINING PROGRAM
Payer: COMMERCIAL

## 2025-06-09 VITALS
DIASTOLIC BLOOD PRESSURE: 93 MMHG | HEART RATE: 121 BPM | WEIGHT: 201 LBS | HEIGHT: 62 IN | TEMPERATURE: 98.6 F | OXYGEN SATURATION: 99 % | SYSTOLIC BLOOD PRESSURE: 140 MMHG | BODY MASS INDEX: 36.99 KG/M2 | RESPIRATION RATE: 18 BRPM
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
## 2025-06-09 PROCEDURE — 6370000000 HC RX 637 (ALT 250 FOR IP): Performed by: STUDENT IN AN ORGANIZED HEALTH CARE EDUCATION/TRAINING PROGRAM

## 2025-06-09 PROCEDURE — 73130 X-RAY EXAM OF HAND: CPT

## 2025-06-09 PROCEDURE — 99283 EMERGENCY DEPT VISIT LOW MDM: CPT

## 2025-06-09 RX ORDER — IBUPROFEN 800 MG/1
800 TABLET, FILM COATED ORAL ONCE
Status: COMPLETED | OUTPATIENT
Start: 2025-06-09 | End: 2025-06-09

## 2025-06-09 RX ORDER — ACETAMINOPHEN 500 MG
1000 TABLET ORAL ONCE
Status: COMPLETED | OUTPATIENT
Start: 2025-06-09 | End: 2025-06-09

## 2025-06-09 RX ORDER — ACETAMINOPHEN 325 MG/1
650 TABLET ORAL EVERY 6 HOURS PRN
Qty: 40 TABLET | Refills: 0 | Status: SHIPPED | OUTPATIENT
Start: 2025-06-09

## 2025-06-09 RX ORDER — GINSENG 100 MG
CAPSULE ORAL ONCE
Status: COMPLETED | OUTPATIENT
Start: 2025-06-09 | End: 2025-06-09

## 2025-06-09 RX ORDER — IBUPROFEN 600 MG/1
600 TABLET, FILM COATED ORAL 3 TIMES DAILY PRN
Qty: 30 TABLET | Refills: 0 | Status: SHIPPED | OUTPATIENT
Start: 2025-06-09

## 2025-06-09 RX ADMIN — IBUPROFEN 800 MG: 800 TABLET, FILM COATED ORAL at 21:32

## 2025-06-09 RX ADMIN — ACETAMINOPHEN 1000 MG: 500 TABLET ORAL at 21:32

## 2025-06-09 RX ADMIN — AMOXICILLIN AND CLAVULANATE POTASSIUM 1 TABLET: 875; 125 TABLET, FILM COATED ORAL at 21:32

## 2025-06-09 RX ADMIN — BACITRACIN: 500 OINTMENT TOPICAL at 23:11

## 2025-06-09 ASSESSMENT — ENCOUNTER SYMPTOMS
EYE DISCHARGE: 0
DIARRHEA: 0
RHINORRHEA: 0
NAUSEA: 0
SHORTNESS OF BREATH: 0
EYE REDNESS: 0
VOMITING: 0
SORE THROAT: 0
ABDOMINAL PAIN: 0

## 2025-06-09 ASSESSMENT — PAIN DESCRIPTION - LOCATION: LOCATION: HAND

## 2025-06-09 ASSESSMENT — PAIN DESCRIPTION - ORIENTATION: ORIENTATION: RIGHT

## 2025-06-09 ASSESSMENT — PAIN DESCRIPTION - DESCRIPTORS: DESCRIPTORS: SHOOTING

## 2025-06-09 ASSESSMENT — PAIN SCALES - GENERAL
PAINLEVEL_OUTOF10: 3
PAINLEVEL_OUTOF10: 8

## 2025-06-09 ASSESSMENT — PAIN - FUNCTIONAL ASSESSMENT
PAIN_FUNCTIONAL_ASSESSMENT: 0-10
PAIN_FUNCTIONAL_ASSESSMENT: 0-10

## 2025-06-10 ENCOUNTER — TELEPHONE (OUTPATIENT)
Dept: ORTHOPEDIC SURGERY | Age: 30
End: 2025-06-10

## 2025-06-10 NOTE — ED PROVIDER NOTES
EMERGENCY DEPARTMENT ENCOUNTER    Pt Name: Eduard Rosas  MRN: [id number]  Birthdate 1995  Date of evaluation: 25  CHIEF COMPLAINT       Chief Complaint   Patient presents with    Animal Bite     To right hand, dogs vaccine status unknown  Pt states \"I might be due for tetanus shot\"     HISTORY OF PRESENT ILLNESS   29-year-old presenting with a dog bite    Neighbors dog bit her in the right hand and the abdominal wall    Mild pain and swelling    No weakness.  No fevers or chills    Unclear of the dog's vaccination status but he has been living with the neighbor acting normally.  Was a provoked bite              REVIEW OF SYSTEMS     Review of Systems   Constitutional:  Negative for chills and fever.   HENT:  Negative for rhinorrhea and sore throat.    Eyes:  Negative for discharge and redness.   Respiratory:  Negative for shortness of breath.    Cardiovascular:  Negative for chest pain.   Gastrointestinal:  Negative for abdominal pain, diarrhea, nausea and vomiting.   Genitourinary:  Negative for dysuria, frequency and urgency.   Musculoskeletal:  Negative for arthralgias and myalgias.   Skin:  Positive for wound. Negative for rash.   Neurological:  Negative for weakness and numbness.   Psychiatric/Behavioral:  Negative for suicidal ideas.    All other systems reviewed and are negative.    PASTMEDICAL HISTORY     Past Medical History:   Diagnosis Date    Ectopic pregnancy 10/04/2024    Smoker      Past Problem List  Patient Active Problem List   Diagnosis Code    Tobacco smoking complicating pregnancy in third trimester O99.333    Cigarette smoker F17.210     21 F Apg  Wt 6#12 O80    Hidradenitis suppurativa L73.2    Incomplete  O03.4    Ruptured ectopic pregnancy O00.90    Benign paroxysmal vertigo, bilateral H81.13    Operative laparoscopy with left salpingectomy 10/4 2024 Z98.890     SURGICAL HISTORY       Past Surgical History:   Procedure Laterality Date    CYSTOSCOPY      DENTAL

## 2025-06-10 NOTE — TELEPHONE ENCOUNTER
Patient referral by ER to Dr. Morgan for Dog bite on right hand / 6/9/2025 (1 hours)  [facility] Emergency Department / xray 6/9/25 [facility].     Will Dr. Morgan see patient for the dog bite or referral to plastic surgeon

## 2025-08-14 ENCOUNTER — OFFICE VISIT (OUTPATIENT)
Dept: OBGYN CLINIC | Age: 30
End: 2025-08-14

## 2025-08-14 ENCOUNTER — HOSPITAL ENCOUNTER (OUTPATIENT)
Age: 30
Setting detail: SPECIMEN
Discharge: HOME | End: 2025-08-14

## 2025-08-14 VITALS
BODY MASS INDEX: 40.3 KG/M2 | HEIGHT: 62 IN | SYSTOLIC BLOOD PRESSURE: 128 MMHG | DIASTOLIC BLOOD PRESSURE: 82 MMHG | HEART RATE: 92 BPM | WEIGHT: 219 LBS

## 2025-08-14 DIAGNOSIS — N92.6 MISSED MENSES: Primary | ICD-10-CM

## 2025-08-14 DIAGNOSIS — O36.80X0 PREGNANCY WITH INCONCLUSIVE FETAL VIABILITY, SINGLE OR UNSPECIFIED FETUS: ICD-10-CM

## 2025-08-14 DIAGNOSIS — R11.0 NAUSEA: ICD-10-CM

## 2025-08-14 DIAGNOSIS — N92.6 MISSED MENSES: ICD-10-CM

## 2025-08-14 DIAGNOSIS — Z32.01 POSITIVE PREGNANCY TEST: ICD-10-CM

## 2025-08-14 PROBLEM — O00.90 RUPTURED ECTOPIC PREGNANCY: Status: RESOLVED | Noted: 2024-10-04 | Resolved: 2025-08-14

## 2025-08-14 PROBLEM — O03.4 INCOMPLETE ABORTION: Status: RESOLVED | Noted: 2022-11-12 | Resolved: 2025-08-14

## 2025-08-14 PROBLEM — Z98.890 S/P LAPAROSCOPY: Status: RESOLVED | Noted: 2024-10-17 | Resolved: 2025-08-14

## 2025-08-14 LAB
B-HCG SERPL EIA 3RD IS-ACNC: 7964 MIU/ML
CONTROL: NORMAL
PREGNANCY TEST URINE, POC: POSITIVE

## 2025-08-14 RX ORDER — PNV NO.95/FERROUS FUM/FOLIC AC 28MG-0.8MG
1 TABLET ORAL DAILY
Qty: 90 TABLET | Refills: 3 | Status: SHIPPED | OUTPATIENT
Start: 2025-08-14

## 2025-08-14 RX ORDER — PNV NO.95/FERROUS FUM/FOLIC AC 28MG-0.8MG
1 TABLET ORAL DAILY
Qty: 30 TABLET | Refills: 12 | Status: CANCELLED | OUTPATIENT
Start: 2025-08-14

## 2025-08-14 RX ORDER — PYRIDOXINE HCL (VITAMIN B6) 50 MG
25 TABLET ORAL NIGHTLY PRN
Qty: 30 TABLET | Refills: 3 | Status: SHIPPED | OUTPATIENT
Start: 2025-08-14 | End: 2026-08-14

## 2025-08-16 ENCOUNTER — HOSPITAL ENCOUNTER (OUTPATIENT)
Dept: LAB | Age: 30
Discharge: HOME | End: 2025-08-16
Payer: COMMERCIAL

## 2025-08-16 DIAGNOSIS — Z32.01 POSITIVE PREGNANCY TEST: ICD-10-CM

## 2025-08-16 DIAGNOSIS — O36.80X0 PREGNANCY WITH INCONCLUSIVE FETAL VIABILITY, SINGLE OR UNSPECIFIED FETUS: ICD-10-CM

## 2025-08-16 DIAGNOSIS — N92.6 MISSED MENSES: ICD-10-CM

## 2025-08-16 LAB — B-HCG SERPL EIA 3RD IS-ACNC: 9785 MIU/ML

## 2025-08-16 PROCEDURE — 36415 COLL VENOUS BLD VENIPUNCTURE: CPT

## 2025-08-16 PROCEDURE — 84702 CHORIONIC GONADOTROPIN TEST: CPT

## 2025-08-18 ENCOUNTER — TELEPHONE (OUTPATIENT)
Dept: OBGYN CLINIC | Age: 30
End: 2025-08-18

## (undated) DEVICE — SUTURE VCRL + SZ 0 L27IN ABSRB UD L36MM CT-1 1/2 CIR VCPP41D

## (undated) DEVICE — STRAP,POSITIONING,KNEE/BODY,FOAM,4X60": Brand: MEDLINE

## (undated) DEVICE — Device

## (undated) DEVICE — GOWN,AURORA,NONREINFORCED,LARGE: Brand: MEDLINE

## (undated) DEVICE — GARMENT,MEDLINE,DVT,INT,CALF,MED, GEN2: Brand: MEDLINE

## (undated) DEVICE — 1LYRTR 16FR10ML100%SIL UMS SNP: Brand: MEDLINE INDUSTRIES, INC.

## (undated) DEVICE — SOLUTION ANTIFOG VIS SYS CLEARIFY LAPSCP

## (undated) DEVICE — SUTURE MONOCRYL SZ 4-0 L18IN ABSRB UD L16MM PC-3 3/8 CIR PRIM Y845G

## (undated) DEVICE — LIGASURE BLUNT TIP 37CM FT10 COMPATIBLE: Brand: MEDLINE

## (undated) DEVICE — SUTURE VICRYL + SZ 0 L27IN ABSRB VLT L26MM UR-6 5/8 CIR VCP603H

## (undated) DEVICE — PLUMEPORT SEO LAPAROSCOPIC SMOKE FILTRATION DEVICE: Brand: PLUMEPORT

## (undated) DEVICE — COVER OR TBL W40XL90IN ABSRB STD AND GRIPPY BK SAHARA

## (undated) DEVICE — BLADE,CARBON-STEEL,10,STRL,DISPOSABLE,TB: Brand: MEDLINE

## (undated) DEVICE — DRAPE,UNDRBUT,WHT GRAD PCH,CAPPORT,20/CS: Brand: MEDLINE

## (undated) DEVICE — TISSUE RETRIEVAL SYSTEM: Brand: INZII RETRIEVAL SYSTEM

## (undated) DEVICE — SYRINGE MED 10ML TRNSLUC BRL PLUNG BLK MRK POLYPR CTRL

## (undated) DEVICE — STRAP ARMBRD W1.5XL32IN FOAM STR YET SFT W/ HK AND LOOP

## (undated) DEVICE — GLOVE ORANGE PI 7   MSG9070

## (undated) DEVICE — SOLUTION IV 1000ML 0.9% SOD CHL PH 5 INJ USP VIAFLX PLAS

## (undated) DEVICE — APPLICATOR MEDICATED 26 CC SOLUTION HI LT ORNG CHLORAPREP

## (undated) DEVICE — SEAL ENDO INSTR SELF SEAL UROLOGY

## (undated) DEVICE — TROCARS: Brand: KII® BALLOON BLUNT TIP SYSTEM

## (undated) DEVICE — LEGGINGS, PAIR, 31X48, STERILE: Brand: MEDLINE

## (undated) DEVICE — C-ARM: Brand: UNBRANDED